# Patient Record
Sex: FEMALE | Race: ASIAN | NOT HISPANIC OR LATINO | Employment: FULL TIME
[De-identification: names, ages, dates, MRNs, and addresses within clinical notes are randomized per-mention and may not be internally consistent; named-entity substitution may affect disease eponyms.]

---

## 2017-10-08 ENCOUNTER — HEALTH MAINTENANCE LETTER (OUTPATIENT)
Age: 32
End: 2017-10-08

## 2022-05-05 ENCOUNTER — OFFICE VISIT (OUTPATIENT)
Dept: OBGYN | Facility: CLINIC | Age: 37
End: 2022-05-05
Payer: COMMERCIAL

## 2022-05-05 VITALS
SYSTOLIC BLOOD PRESSURE: 113 MMHG | TEMPERATURE: 98.4 F | BODY MASS INDEX: 23.92 KG/M2 | HEART RATE: 93 BPM | WEIGHT: 135 LBS | DIASTOLIC BLOOD PRESSURE: 72 MMHG | HEIGHT: 63 IN

## 2022-05-05 DIAGNOSIS — B00.1 RECURRENT COLD SORES: ICD-10-CM

## 2022-05-05 DIAGNOSIS — Z31.41 FERTILITY TESTING: ICD-10-CM

## 2022-05-05 DIAGNOSIS — N39.0 FREQUENT UTI: ICD-10-CM

## 2022-05-05 DIAGNOSIS — F33.1 MODERATE EPISODE OF RECURRENT MAJOR DEPRESSIVE DISORDER (H): ICD-10-CM

## 2022-05-05 DIAGNOSIS — Z11.3 SCREEN FOR STD (SEXUALLY TRANSMITTED DISEASE): ICD-10-CM

## 2022-05-05 DIAGNOSIS — Z23 NEED FOR TDAP VACCINATION: ICD-10-CM

## 2022-05-05 DIAGNOSIS — Z01.419 ENCOUNTER FOR GYNECOLOGICAL EXAMINATION WITHOUT ABNORMAL FINDING: Primary | ICD-10-CM

## 2022-05-05 DIAGNOSIS — Z12.4 SCREENING FOR MALIGNANT NEOPLASM OF CERVIX: ICD-10-CM

## 2022-05-05 DIAGNOSIS — N89.8 VAGINAL DISCHARGE: ICD-10-CM

## 2022-05-05 LAB
CLUE CELLS: ABNORMAL
TRICHOMONAS, WET PREP: ABNORMAL
WBC'S/HIGH POWER FIELD, WET PREP: ABNORMAL
YEAST, WET PREP: ABNORMAL

## 2022-05-05 PROCEDURE — 90471 IMMUNIZATION ADMIN: CPT | Performed by: OBSTETRICS & GYNECOLOGY

## 2022-05-05 PROCEDURE — 99214 OFFICE O/P EST MOD 30 MIN: CPT | Mod: 25 | Performed by: OBSTETRICS & GYNECOLOGY

## 2022-05-05 PROCEDURE — G0123 SCREEN CERV/VAG THIN LAYER: HCPCS | Performed by: OBSTETRICS & GYNECOLOGY

## 2022-05-05 PROCEDURE — 86780 TREPONEMA PALLIDUM: CPT | Performed by: OBSTETRICS & GYNECOLOGY

## 2022-05-05 PROCEDURE — 87389 HIV-1 AG W/HIV-1&-2 AB AG IA: CPT | Performed by: OBSTETRICS & GYNECOLOGY

## 2022-05-05 PROCEDURE — 83520 IMMUNOASSAY QUANT NOS NONAB: CPT | Mod: 90 | Performed by: OBSTETRICS & GYNECOLOGY

## 2022-05-05 PROCEDURE — 90715 TDAP VACCINE 7 YRS/> IM: CPT | Performed by: OBSTETRICS & GYNECOLOGY

## 2022-05-05 PROCEDURE — 36415 COLL VENOUS BLD VENIPUNCTURE: CPT | Performed by: OBSTETRICS & GYNECOLOGY

## 2022-05-05 PROCEDURE — 99000 SPECIMEN HANDLING OFFICE-LAB: CPT | Performed by: OBSTETRICS & GYNECOLOGY

## 2022-05-05 PROCEDURE — 99385 PREV VISIT NEW AGE 18-39: CPT | Mod: 25 | Performed by: OBSTETRICS & GYNECOLOGY

## 2022-05-05 PROCEDURE — 87491 CHLMYD TRACH DNA AMP PROBE: CPT | Performed by: OBSTETRICS & GYNECOLOGY

## 2022-05-05 PROCEDURE — 87210 SMEAR WET MOUNT SALINE/INK: CPT | Performed by: OBSTETRICS & GYNECOLOGY

## 2022-05-05 PROCEDURE — 87591 N.GONORRHOEAE DNA AMP PROB: CPT | Performed by: OBSTETRICS & GYNECOLOGY

## 2022-05-05 PROCEDURE — 87624 HPV HI-RISK TYP POOLED RSLT: CPT | Performed by: OBSTETRICS & GYNECOLOGY

## 2022-05-05 PROCEDURE — 86803 HEPATITIS C AB TEST: CPT | Performed by: OBSTETRICS & GYNECOLOGY

## 2022-05-05 RX ORDER — VALACYCLOVIR HYDROCHLORIDE 1 G/1
2000 TABLET, FILM COATED ORAL 2 TIMES DAILY
Qty: 4 TABLET | Refills: 3 | Status: SHIPPED | OUTPATIENT
Start: 2022-05-05 | End: 2022-05-06

## 2022-05-05 ASSESSMENT — ANXIETY QUESTIONNAIRES
1. FEELING NERVOUS, ANXIOUS, OR ON EDGE: MORE THAN HALF THE DAYS
7. FEELING AFRAID AS IF SOMETHING AWFUL MIGHT HAPPEN: SEVERAL DAYS
6. BECOMING EASILY ANNOYED OR IRRITABLE: MORE THAN HALF THE DAYS
GAD7 TOTAL SCORE: 9
IF YOU CHECKED OFF ANY PROBLEMS ON THIS QUESTIONNAIRE, HOW DIFFICULT HAVE THESE PROBLEMS MADE IT FOR YOU TO DO YOUR WORK, TAKE CARE OF THINGS AT HOME, OR GET ALONG WITH OTHER PEOPLE: SOMEWHAT DIFFICULT
3. WORRYING TOO MUCH ABOUT DIFFERENT THINGS: SEVERAL DAYS
5. BEING SO RESTLESS THAT IT IS HARD TO SIT STILL: NOT AT ALL
2. NOT BEING ABLE TO STOP OR CONTROL WORRYING: SEVERAL DAYS

## 2022-05-05 ASSESSMENT — PATIENT HEALTH QUESTIONNAIRE - PHQ9
5. POOR APPETITE OR OVEREATING: MORE THAN HALF THE DAYS
SUM OF ALL RESPONSES TO PHQ QUESTIONS 1-9: 10

## 2022-05-05 NOTE — PROGRESS NOTES
Ryanne is a 36 year old   female who presents for annual exam.     Menses are regular q 28-30 days and normal lasting 6 days.  Menses flow: normal and light.  Patient's last menstrual period was 2022.. Using none for contraception.  She is not currently considering pregnancy.  Besides routine health maintenance, she has there following health concerns:  - Depression. Was diagnosed with Bipolar and was prescribed lithium. Felt horrible on it and it made everything worse. This was through her former PCP. Then was diagnosed by psychiatry with depression and did well on other medications. Now is not on anything. Needs a new psychiatrist. Wants to work with psychiatry. Does endorse some thoughts of better off dead, but feels this is her baseline and has no concerns for her safety. Her mom is going through some health concerns, and it is really making Ryanne want to be alive and to prioritize her health. Thus she is returning to health care after many years away. Would like a new therapist as well.   PHQ 2022   PHQ-9 Total Score 10   Q9: Thoughts of better off dead/self-harm past 2 weeks Several days     ANU-7 SCORE 2022   Total Score 9     - Wondering about her fertility. Would like to get a basic understanding of if she might be facing age related fertility decline. Has not used contraception, but is not actively trying to get pregnant. Stable partner of several years.   - Had several UTIs last year. All treated with virtual visits without UAs or cultures  - Describes cold sores, does virtual visits to get valtrex.   Works as a  in tech.   Loves cats.     GYNECOLOGIC HISTORY:  Menarche: 12  Age at first intercourse: 14 Number of lifetime partners: 30  Ryanne is sexually active with 1male partner(s) and is currently in monogamous relationship .    History sexually transmitted infections:No STD history  STI testing offered?  Accepted  ROGERS exposure: No  History of abnormal Pap smear: NO - age  "30- 65 PAP every 3 years recommended  Family history of breast CA: No  Family history of uterine/ovarian CA: No    Family history of colon CA: No mom had polpys    HEALTH MAINTENANCE:  Cholesterol: (No results found for: CHOL History of abnormal lipids: No  Mammo: no . History of abnormal Mammo: No.  Regular Self Breast Exams: No  Calcium/Vitamin D intake: source:  dairy, veggies Adequate? Yes  TSH: (No results found for: TSH )  Pap; (No results found for: PAP )    HISTORY:  OB History    Para Term  AB Living   1 0 0 0 1 0   SAB IAB Ectopic Multiple Live Births   0 0 0 0 0      # Outcome Date GA Lbr Torin/2nd Weight Sex Delivery Anes PTL Lv   1 AB              History reviewed. No pertinent past medical history.  History reviewed. No pertinent surgical history.  Family History   Problem Relation Age of Onset     Cancer Mother         Lung Cancer     Hypertension Mother      Hyperlipidemia Mother      Social History     Socioeconomic History     Marital status: Single     No current outpatient medications on file.   No Known Allergies    Past medical, surgical, social and family history were reviewed and updated in EPIC.    ROS:   C:     NEGATIVE for fever, chills, change in weight  I:       NEGATIVE for worrisome rashes, moles or lesions  E:     NEGATIVE for vision changes or irritation  E/M: NEGATIVE for ear, mouth and throat problems  R:     NEGATIVE for significant cough or SOB  CV:   NEGATIVE for chest pain, palpitations or peripheral edema  GI:     NEGATIVE for nausea, abdominal pain, heartburn, or change in bowel habits  :   NEGATIVE for frequency, dysuria, hematuria, vaginal discharge, or irregular bleeding  M:     NEGATIVE for significant arthralgias or myalgia  N:      NEGATIVE for weakness, dizziness or paresthesias  E:      NEGATIVE for temperature intolerance, skin/hair changes  P:      As above    EXAM:  /72   Pulse 93   Temp 98.4  F (36.9  C)   Ht 1.588 m (5' 2.52\")   Wt 61.2 kg " (135 lb)   LMP 2022   Breastfeeding No   BMI 24.28 kg/m     BMI: Body mass index is 24.28 kg/m .  Constitutional: healthy, alert and no distress  Head: Normocephalic. No masses, lesions, tenderness or abnormalities  Neck: Neck supple. Trachea midline. No adenopathy. Thyroid symmetric, normal size.   Cardiovascular: RRR.   Respiratory: Negative.   Breast: Breasts reveal mild symmetric fibrocystic densities, but there are no dominant, discrete, fixed or suspicious masses found.  Gastrointestinal: Abdomen soft, non-tender, non-distended. No masses, organomegaly.  :  Vulva:  No external lesions, normal female hair distribution, no inguinal adenopathy.  Copious thin white vaginal discharge.   Urethra:  Midline, non-tender, well supported, no discharge  Vagina:  Moist, pink, no abnormal discharge, no lesions  Uterus:  Normal size, anteverted , non-tender, freely mobile  Ovaries:  No masses appreciated, non-tender, mobile  Rectal Exam: deferred  Musculoskeletal: extremities normal  Skin: no suspicious lesions or rashes  Psychiatric: Affect appropriate, cooperative,mentation appears normal.     COUNSELING:   Reviewed preventive health counseling, as reflected in patient instructions       Regular exercise       Healthy diet/nutrition       Family planning       Folic Acid       HIV screeninx in teen years, 1x in adult years, and at intervals if high risk   reports that she has quit smoking. She has never used smokeless tobacco.    Body mass index is 24.28 kg/m .    FRAX Risk Assessment    ASSESSMENT:  36 year old female with satisfactory annual exam  (Z01.419) Encounter for gynecological examination without abnormal finding  (primary encounter diagnosis)  Comment:   Plan: Getting UTD on HM today. Celebrated her return to health care.     (Z23) Need for Tdap vaccination  Comment:   Plan: TDAP (ADACEL,BOOSTRIX)            (Z11.3) Screen for STD (sexually transmitted disease)  Comment:   Plan: NEISSERIA  GONORRHOEA PCR, CHLAMYDIA TRACHOMATIS        PCR, HIV Antigen Antibody Combo, Hepatitis C         Screen Reflex to HCV RNA Quant and Genotype,         Treponema Abs w Reflex to RPR and Titer            (Z12.4) Screening for malignant neoplasm of cervix  Comment:   Plan: Pap imaged thin layer screen with HPV -         recommended age 30 - 65 years (select HPV order        below)            (F33.1) Moderate episode of recurrent major depressive disorder (H)  Comment:   Plan: Adult Mental Health  Referral        Discussed symptoms and plan.   Referral to psych given complicated history  Discussed [facility] for short term therapy and bridge to long term therapy and gave contact info.     (Z31.41) Fertility testing  Comment:   Plan: Anti-Mullerian hormone        Discussed limitations and capabilities of ovarian reserve testing. Feels she would move forward with TTC or egg cryopreservation if she found concerns.     (N89.8) Vaginal discharge  Comment:   Plan: Wet prep - Clinic Collect        Wet prep negative     (B00.1) Recurrent cold sores  Comment:   Plan: valACYclovir (VALTREX) 1000 mg tablet            (N39.0) Frequent UTI  Comment: on file to test if needed.   Plan: UA with Microscopic reflex to Culture - lab         collect

## 2022-05-06 LAB
C TRACH DNA SPEC QL NAA+PROBE: NEGATIVE
HCV AB SERPL QL IA: NONREACTIVE
HIV 1+2 AB+HIV1 P24 AG SERPL QL IA: NONREACTIVE
N GONORRHOEA DNA SPEC QL NAA+PROBE: NEGATIVE
T PALLIDUM AB SER QL: NONREACTIVE

## 2022-05-06 ASSESSMENT — ANXIETY QUESTIONNAIRES: GAD7 TOTAL SCORE: 9

## 2022-05-07 LAB — MIS SERPL-MCNC: 10 NG/ML

## 2022-05-10 LAB
BKR LAB AP GYN ADEQUACY: NORMAL
BKR LAB AP GYN INTERPRETATION: NORMAL
BKR LAB AP HPV REFLEX: NORMAL
BKR LAB AP LMP: NORMAL
BKR LAB AP PREVIOUS ABNORMAL: NORMAL
PATH REPORT.COMMENTS IMP SPEC: NORMAL
PATH REPORT.COMMENTS IMP SPEC: NORMAL
PATH REPORT.RELEVANT HX SPEC: NORMAL

## 2022-05-12 LAB
HUMAN PAPILLOMA VIRUS 16 DNA: NEGATIVE
HUMAN PAPILLOMA VIRUS 18 DNA: NEGATIVE
HUMAN PAPILLOMA VIRUS FINAL DIAGNOSIS: NORMAL
HUMAN PAPILLOMA VIRUS OTHER HR: NEGATIVE

## 2023-02-10 ASSESSMENT — ANXIETY QUESTIONNAIRES
1. FEELING NERVOUS, ANXIOUS, OR ON EDGE: NEARLY EVERY DAY
6. BECOMING EASILY ANNOYED OR IRRITABLE: MORE THAN HALF THE DAYS
GAD7 TOTAL SCORE: 14
4. TROUBLE RELAXING: NEARLY EVERY DAY
3. WORRYING TOO MUCH ABOUT DIFFERENT THINGS: MORE THAN HALF THE DAYS
7. FEELING AFRAID AS IF SOMETHING AWFUL MIGHT HAPPEN: SEVERAL DAYS
5. BEING SO RESTLESS THAT IT IS HARD TO SIT STILL: SEVERAL DAYS
7. FEELING AFRAID AS IF SOMETHING AWFUL MIGHT HAPPEN: SEVERAL DAYS
IF YOU CHECKED OFF ANY PROBLEMS ON THIS QUESTIONNAIRE, HOW DIFFICULT HAVE THESE PROBLEMS MADE IT FOR YOU TO DO YOUR WORK, TAKE CARE OF THINGS AT HOME, OR GET ALONG WITH OTHER PEOPLE: SOMEWHAT DIFFICULT
8. IF YOU CHECKED OFF ANY PROBLEMS, HOW DIFFICULT HAVE THESE MADE IT FOR YOU TO DO YOUR WORK, TAKE CARE OF THINGS AT HOME, OR GET ALONG WITH OTHER PEOPLE?: SOMEWHAT DIFFICULT
2. NOT BEING ABLE TO STOP OR CONTROL WORRYING: MORE THAN HALF THE DAYS
GAD7 TOTAL SCORE: 14
GAD7 TOTAL SCORE: 14

## 2023-02-10 ASSESSMENT — PATIENT HEALTH QUESTIONNAIRE - PHQ9
10. IF YOU CHECKED OFF ANY PROBLEMS, HOW DIFFICULT HAVE THESE PROBLEMS MADE IT FOR YOU TO DO YOUR WORK, TAKE CARE OF THINGS AT HOME, OR GET ALONG WITH OTHER PEOPLE: SOMEWHAT DIFFICULT
SUM OF ALL RESPONSES TO PHQ QUESTIONS 1-9: 17
SUM OF ALL RESPONSES TO PHQ QUESTIONS 1-9: 17

## 2023-02-16 ENCOUNTER — VIRTUAL VISIT (OUTPATIENT)
Dept: PSYCHIATRY | Facility: CLINIC | Age: 38
End: 2023-02-16
Payer: COMMERCIAL

## 2023-02-16 DIAGNOSIS — F31.62 BIPOLAR 1 DISORDER, MIXED, MODERATE (H): Primary | ICD-10-CM

## 2023-02-16 DIAGNOSIS — F41.1 GAD (GENERALIZED ANXIETY DISORDER): ICD-10-CM

## 2023-02-16 DIAGNOSIS — F33.1 MODERATE EPISODE OF RECURRENT MAJOR DEPRESSIVE DISORDER (H): ICD-10-CM

## 2023-02-16 PROCEDURE — 99205 OFFICE O/P NEW HI 60 MIN: CPT | Mod: VID | Performed by: NURSE PRACTITIONER

## 2023-02-16 RX ORDER — HYDROXYZINE HYDROCHLORIDE 25 MG/1
25 TABLET, FILM COATED ORAL 3 TIMES DAILY PRN
Qty: 90 TABLET | Refills: 0 | Status: SHIPPED | OUTPATIENT
Start: 2023-02-16 | End: 2023-04-13 | Stop reason: DRUGHIGH

## 2023-02-16 RX ORDER — LAMOTRIGINE 25 MG/1
25 TABLET ORAL DAILY
Qty: 90 TABLET | Refills: 1 | Status: SHIPPED | OUTPATIENT
Start: 2023-02-16 | End: 2023-03-16

## 2023-02-16 RX ORDER — QUETIAPINE FUMARATE 50 MG/1
50 TABLET, FILM COATED ORAL AT BEDTIME
Qty: 30 TABLET | Refills: 1 | Status: SHIPPED | OUTPATIENT
Start: 2023-02-16 | End: 2023-04-27

## 2023-02-16 NOTE — PROGRESS NOTES
Ryanne Goff is a 37 year old who is being evaluated via a billable video visit.      Pt will join video visit via: [employer]  If there are problems joining the visit, send backup video invite via: Text to preferred phone: [phone]    Reason for telehealth visit: Patient has requested telehealth visit    Originating location (patient location): Patient's home    Will anyone else be joining the visit? Vida DACOSTA

## 2023-02-16 NOTE — NURSING NOTE
Is the patient currently in the state of [place]? YES    Visit mode:VIDEO    If the visit is dropped, the patient can be reconnected by: VIDEO VISIT: Text to cell phone: [phone]    Will anyone else be joining the visit? NO      How would you like to obtain your AVS? MyChart    Are changes needed to the allergy or medication list? NO    Comments or concerns regarding today's visit: TREVOR Sung VF

## 2023-02-16 NOTE — PROGRESS NOTES
"PSYCHIATRIC DIAGNOSTIC ASSESSMENT      Name:  Ryanne Goff  : 1985    Ryanne Goff is a 37 year old female who is being evaluated via a billable Video visit.      Telemedicine Visit: The patient's condition can be safely assessed and treated via synchronous audio and visual telemedicine encounter.      Reason for Telemedicine Visit: COVID 19 pandemic and the social and physical recommendations by the CDC and MD., Patient has requested telehealth visit, and Patient unable to travel      Originating Site (Patient Location): Patient's home    Distant Site (Provider Location): [facility] Outpatient Setting: [facility]    Consent:  The patient/guardian has verbally consented to: the potential risks and benefits of telemedicine (video visit or phone) versus in person care; bill my insurance or make self-payment for services provided; and responsibility for payment of non-covered services.     Mode of Communication:  [employer] platform     As the provider I attest to compliance with applicable laws and regulations related to telemedicine.                                              CHIEF COMPLAINT     \"To establish long term psychiatric care\"  HISTORY OF PRESENT ILLNESS     Patient is a 37 year old,   Not  or  female with a history of bipolar 1,mixed, moderate, generalized anxiety disorder, and moderate episode of recurrent major depressive disorder who presents for initial psychiatric evaluation following a referral by her primary physician, Elba Marley MD to establish long-term psychiatric care for the medication management of ongoing psychiatric symptoms related to mood dysregulation and anxiety.  Patient reports she has been stabilized on her previous medication therapy which include Trileptal, Seroquel, and propranolol but stopped taking this medication in the 2001 due to ineffectiveness.  Patient reported she stopped seeing her psychiatrist during this " period because he was no longer a good fit but continued to stabilize despite not taking medication.  Patient reports she has  started experiencing mood dysregulation about 2 weeks ago in the form of increased irritability, low frustration tolerance, difficulty falling asleep and staying asleep for several days while alternating with excessive sleep for extended period of time, excessive crying, and heightened anxiety.  She reported history of suicidal ideation and attempts with most recently in 2005 when he was hospitalized at [facility].  She reported several history of SIB by cutting but has not engaged in this for the past 5 years.  She denied history of substance abuse but reported experimenting with methamphetamine, cocaine, and marijuana while in college.  She has done EMDR therapy in the past but currently does not have a therapy.  She denies SI, SIB, and HI.  She also denies both auditory and visual hallucination.  She denies psychosis and lynette. Return to Clinic in 4 weeks for follow up.   PSYCHIATRIC HISTORY:   History of Psychiatric Hospitalizations:   - Inpatient: 2005 at [facility] related to cutting herself  - IOP/PHP/Day treatment: None  History of Suicidal Ideation: Positive  History of Suicide Attempts:  Positive by cutting herself   History of Self-injurious Behavior: Reports history of SIB by cutting most recent in 2017.  Current:  No  History of Violence/Aggression: Negative  History of Commitment? Negative  Electroconvulsive Therapy (ECT):Negative    PSYCHIATRIC REVIEW OF SYSTEMS:   Psychiatric Review of Systems:   Depression:   Reports: depressed mood, decreased interest, changes in sleep, changes in appetite, guilt, hopelessness, helplessness, impaired concentration, decreased energy, irritability.  Lyntete:   Denies: sleeplessness, increased goal-directed activities, abrupt increase in energy pressured speech  Psychosis:   Denies: visual hallucinations, auditory hallucinations,  "paranoia  Anxiety:   Reports: excessive worries that are difficult to control, panic attacks  PTSD:   Reports: re-experiencing past trauma, nightmares, increased arousal, avoidance of traumatic stimuli, impaired function.  Denies: re-experiencing past trauma, nightmares, increased arousal, avoidance of traumatic stimuli, impaired function.  OCD:   Denies: obsessions, checking, symmetry, cleaning, skin picking.  Eating Disorder:   Denies: restriction, binging, purging.    Sleep:       MEDICATIONS                                                                                                Current Outpatient Medications   Medication Sig     valACYclovir (VALTREX) 1000 mg tablet Take 2 tablets (2,000 mg) by mouth 2 times daily for 1 day     No current facility-administered medications for this visit.       DRUG MONITORING:  [place] Prescription Monitoring Program evaluating controlled substances in the last year in [place]:  [place] Pres[place] review was not needed today..      PAST PSYCHOTROPIC MEDICATIONS:  Prozac, Celexa, Lithium, Trileptal, Seroquel, Zoloft, Lexapro    VITALS   There were no vitals taken for this visit.     BP Readings from Last 1 Encounters:   05/05/22 113/72     Pulse Readings from Last 1 Encounters:   05/05/22 93     Wt Readings from Last 1 Encounters:   05/05/22 61.2 kg (135 lb)     Ht Readings from Last 1 Encounters:   05/05/22 1.588 m (5' 2.52\")     Estimated body mass index is 24.28 kg/m  as calculated from the following:    Height as of 5/5/22: 1.588 m (5' 2.52\").    Weight as of 5/5/22: 61.2 kg (135 lb).      PERTINENT HISTORY   PAST MEDICAL HISTORY:   Past Medical History:   Diagnosis Date     Moderate episode of recurrent major depressive disorder (H)        PAST SURGICAL HISTORY:   Past Surgical History:   Procedure Laterality Date     wisdom teeth         FAMILY HISTORY:   Family History   Problem Relation Age of Onset     Cancer Mother         Lung Cancer     " Hypertension Mother      Hyperlipidemia Mother        SOCIAL HISTORY:   Social History     Tobacco Use     Smoking status: Former     Smokeless tobacco: Never   Substance Use Topics     Alcohol use: Yes         Seizures or Head Injury: Denies history of head injury. Denies history of seizures.  History of cardiac disease, rheumatic fever, fainting or dizziness, especially with exercise, seizures, chest pain or shortness of breath with exercise, unexplained change in exercise tolerance, palpitations, high blood pressure, or heart murmur?   No    LABS & IMAGING                                                                                                                Personally reviewed  No lab results found.  No lab results found.  No lab results found.  No lab results found.  No results found for: RHV667, TBPR726, [id number], VITD3, D2VIT, D3VIT, DTOT, [id number], [id number], [id number], [id number], [id number], [id number]     ALLERGY & IMMUNIZATIONS     No Known Allergies    FAMILY MEDICAL HISTORY:     Family History     Problem (# of Occurrences) Relation (Name,Age of Onset)    Cancer (1) Mother: Lung Cancer    Hypertension (1) Mother    Hyperlipidemia (1) Mother            Family history of sudden or unexplained death or an event requiring resuscitation in children or young adults, cardiac arrhythmias (eg, Braulio-Parkinson-White syndrome), long QT syndrome, catecholaminergic paroxysmal ventricular tachycardia, Brugada syndrome, arrhythmogenic right ventricular dysplasia, hypertrophic cardiomyopathy, dilated cardiomyopathy, or Marfan syndrome?  No    FAMILY PSYCHIATRIC HISTORY:   Psychiatry:Depression runs in father's side  Substance use history in family: Negative  Family suicide history:Negative      SIGNIFICANT SOCIAL/FAMILY HISTORY:                                           Born and raised in: [place]  Relationship status: Single but engaged  Children: None    Highest education level was:Bachelor's  "degree   Service:Denies  Employment status:  of a software company  LEGAL:     SUBSTANCE USE HISTORY    Tobacco use: Quit smoking in 2021  Caffeine: 1 cup of coffee once weekly  Current alcohol: Drinks socially about once a week  Current substance use:Denies  Past use alcohol/substance use:Marijuana, methamphetamine, cocaine      MEDICAL REVIEW OF SYSTEMS:   Ten system review was completed with pertinent positives noted above    MENTAL STATUS EXAM:   Mental Status Examination (limited due to video virtual visit format):  Vital Signs: There were no vitals taken for this virtual visit.  Appearance: adequately groomed, appears stated age, and in no apparent distress.  Attitude: cooperative   Eye Contact: good to the extent that can be determined in a video visit  Muscle Strength and Tone: no gross abnormalities based on remote observation  Psychomotor Behavior:  no evidence of tardive dyskinesia, dystonia, or tics based on remote observation  Gait and Station: normal, no gross abnormalities based on remote observation  Speech: clear, coherent, normal prosody, regular rate, regular rhythm and fluent  Associations: No loosening of associations  Thought Process: coherent and goal directed  Thought Content: no evidence of suicidal ideation or homicidal ideation, no evidence of psychotic thought, no auditory hallucinations present and no visual hallucinations present  Mood: \"good\"  Affect: appropriate and in normal range  Insight: good  Judgment: intact, adequate for safety  Impulse Control: intact  Oriented to: time, place, person and situation  Attention Span and Concentration: normal  Language: Intact  Recent and Remote Memory: intact to interview. Not formally assessed. No amnesia.  Fund of Knowledge: appropriate        SAFETY   Feels safe in home: Yes   Suicidal ideation: Denies  History of suicide attempts:  No   Hx of impulsivity: No     DSM 5 DIAGNOSIS:   1. Bipolar 1 disorder, mixed, moderate " (H)    2. ANU (generalized anxiety disorder)    3. Moderate episode of recurrent major depressive disorder (H)    ASSESSMENT AND PLAN    Patient is a 37 year old,   Not  or  female with a history of bipolar 1,mixed, moderate, generalized anxiety disorder, and moderate episode of recurrent major depressive disorder who presents for initial psychiatric evaluation following a referral by her primary physician, Elba Marley MD to establish long-term psychiatric care for the medication management of ongoing psychiatric symptoms related to mood dysregulation and anxiety.  Patient with a history of suicidal ideation and attempts has stopped taking her psychiatric medication as follow-up 2001 due to ineffectiveness as well as having bad relationship with her previous psychiatrist.  She stopped seeing her psychiatrist because she believed they were no longer a good match.  She has been experiencing mood and emotional reactivity in the form of increased irritability, low frustration tolerance, poor anger management, increased agitation, periods of lack of sleep while alternating with periods of excessive sleep, excessive crying, and heightened anxiety in the setting of medication noncompliance.  Patient presentation and symptoms appear to be consistent with bipolar 1 disorder, moderate with some  elements of borderline personality disorder.  She used to engage in self-injurious behavior by cutting but has not engaged in this behavior for the past 5 years.  She has done EMDR therapy in [place], [place] few years ago to help cope with past trauma related to significant other committing suicide with his family blaming it on her.  She is currently looking to connecting with a therapist for individual psychotherapy.  I started the patient on Lamictal 25 mg for 2 weeks then increase to 50 mg after 2 weeks and then take 75 mg after 4 weeks to effectively manage mood and emotional lability while also starting her on  Seroquel 50 mg at bedtime to help with anxiety and difficulty falling and staying asleep.  Patient was also started on hydroxyzine 25 mg 3 times daily as needed for anxiety.  I discussed medication side effects, benefits, and risks with the patient.  Patient verbalized good understanding and she is in agreement to taking Lamictal, Seroquel, and hydroxyzine to help manage ongoing symptoms.  She denies SI, SIB, and ideation.  She also denies both auditory and visual hallucination.  She denies amber and psychosis. RTC in 4 weeks for follow up.     Plan:  1.Patient will take the medications as prescribed.   Medications: Take Lamictal 1 tablet (25 mg) For two weeks, then take 2 tablets (50 mg) after two weeks, then increase to 3 tablets (75 mg) after four weeks for mood, anxiety and depression  Take Seroquel 50 mg at bedtime for mood anxiety and sleep  Take Hydroxyzine 25 mg three times daily as needed for anxiety  Patient will not stop taking medications or adjust them without consulting with the provider.  2.Patient will call with any problems between visits.  3.Patient will go to the emergency room if not feeling safe , unable to function in the community, or if suicidal, homicidal or hearing voices or having paranoia.  4.Patient will abstain from drugs and alcohol./Pt denies use .  5.Patient will not drive if sedated on medications or under influence of any substance.    6.Patient will not mix psychiatric medications with drugs and alcohol.   7.Patient will watch her diet and exercise.  8.Patient will see non psychiatric providers for non psychiatric disorders.  9. Next appointment in one  month     Risk Assessment:     [place] has notable risk factors for self-harm, including, single status, anxiety, hx of suicide attempt and SIB,  and passive SI. However, risk is mitigated by ability to volunteer a safety plan and history of seeking help when needed. Additional steps taken to minimize risk include making medication  adjustment, asking patient to call 911 and go to the ER if not able to stay safe at home,  Therefore, based on all available evidence including the factors cited above, Ryanne does not appear to be an imminent danger to self or others and does not meet criteria 72 hour hold. However, if patient uses substances or is non-adherent with medication, their risk of decompensation and SI/HI will be elevated. This was discussed with the patient as she verbalized good understanding.  CONSULTS/REFERRALS:   Continue therapy  None at this time  Coordinate care with therapist as needed    MEDICAL:   None at this time  Coordinate care with PCP (Jaclyn Kelly) as needed  Follow up with primary care provider as planned or for acute medical concerns.    PSYCHOEDUCATION:  Medication side effects and alternatives reviewed. Health promotion activities recommended and reviewed today. All questions addressed. Education and counseling completed regarding risks and benefits of medications and psychotherapy options.  Consent provided by patient/guardian  Call the psychiatric nurse line with medication questions or concerns at [phone].  [employer] may be used to communicate with your provider, but this is not intended to be used for emergencies.  BLACK BOX WARNING: Discussed the Food and Drug Administration (FDA) requires that all antidepressants carry a warning that some children, adolescents and young adults may be at increased risk of suicide when taking antidepressants. Anyone taking an antidepressant should be watched closely for worsening depression or unusual behavior especially in the first few weeks after starting an SSRI. Keep in mind, antidepressants are more likely to reduce suicide risk in the long run by improving mood.   SEROTONIN SYNDROME:  Discussed risks of Serotonin syndrome (ie, serotonin toxicity) which is a potentially life-threatening condition associated with increased serotonergic activity in the central nervous  system (CNS). It is seen with therapeutic medication use, inadvertent interactions between drugs, and intentional self-poisoning. Serotonin syndrome may involve a spectrum of clinical findings, which often include mental status changes, autonomic hyperactivity, and neuromuscular abnormalities.    BENZODIAZEPINE:  discussion on how benzos work and the need to use them short term due to potential of anxiety getting.  This is a controlled substance with risk for abuse, need to keep in a safe keep place and cannot replace lost scripts.    HYPNOTIC USE: Hypnotic use, risk for CNS depression, sleep-walking, not to mix with ETOH or other CNS depressant, need for six hours of sleep, stop if change in mood.  This is a controlled substance with risk for abuse, need to keep in a safe keep place and cannot replace lost scripts.  FIRST GENERATION ANTIPSYCHOTIC/ SECOND GENERATION ANTIPSYCHOTIC USE:  Atypical need for cardiometabolic monitoring with medication- B/P, weight, blood sugar, cholesterol.  Need to monitor for abnormal movements taught  SAFETY:  We all care about your loved one's safety. To reduce the risk of self-harm, remove access to all:  Firearms, Medicines (both prescribed and over-the-counter), Knives and other sharp objects, Ropes and like materials, and Alcohol  SLEEP HYGIENE: establish a sleep routine, limit screen time 1 hour prior to bed, use bed for sleep only, take sleep/medications on time (including sleepy time tea, trazadone or herbal treatments such as melatonin), aroma therapy, limit caffeine/sugar, yoga, guided imagery, stretch, meditation, limit naps to 20 minutes, make a temperature change in the room, white noise, be mindful of slowing down breathing, take a warm bath/shower, frequently wash sheets, and journaling.   Medlineplus.gov is information for patients.  It is run by the [employer] and it contains information about all disorders, diseases and all medications.       COMMUNITY RESOURCES:    CRISIS NUMBERS: Provided in AVS 2023  National Suicide Prevention Lifeline: 4-475-526-TALK (957-766-1197)  [employer]/resources for a list of additional resources (SOS)            [facility] - [phone]   Urgent Care Adult Mental [facility]-[phone] mobile unit/  crisis line  [facility] -[phone]   COPE  [place] Mobile Team -[phone] (adults)/ [phone] (child)  Poison Control Center - 1-107.170.5071    OR  go to nearest ER  Crisis Text Line for any crisis  send this-   To: 722849   [facility]  [phone]  National Suicide Prevention Lifeline: 684.962.7892 (TTY: 898.865.7284). Call anytime for help.  (www.suicidepreventionlifeline.org)  National [place] [employer] Mental Illness (www.dio.org): 760-390-2399 or 795-954-5141.   Mental Health Association (www.mentalhealth.org): 522.147.5081 or 149-133-9186.  [place] Crisis Text Line: Text [place] to 537497  Suicide LifeLine Chat: suicideLumoraline.org/chat    ADMINISTRATIVE BILLIN Time spent interviewing patient, reviewing referral documents, obtaining and reviewing outside records, communication with other health specialists, and preparing this report on this day: 23    Video/Phone Start Time:8:35 am    Video/Phone End Time: 9:08 am    Greater than 50% of time was spent in counseling and coordination of care regarding above diagnoses and treatment plan.    Patient Status:  Our psychiatry providers act as a specialty service for Primary Care Providers in the [facility] that seek to optimize medications for unstable patients.  Once medications have been optimized, our providers discharge the patient back to the referring Primary Care Provider for ongoing medication management.  This type of system allows our providers to serve a high volume of patients. At this time  Patient will continue to be seen for ongoing  consultation and stabilization.    Signed:   Corby Jaeger, MSN, APRN, PMHNP-BC  Long Term Outpatient Psychiatry  Chart documentation done in part with Dragon Voice Recognition software.  Although reviewed after completion, some word and grammatical errors may remain.  Answers for HPI/ROS submitted by the patient on 2/10/2023  If you checked off any problems, how difficult have these problems made it for you to do your work, take care of things at home, or get along with other people?: Somewhat difficult  PHQ9 TOTAL SCORE: 17  ANU 7 TOTAL SCORE: 14

## 2023-02-16 NOTE — PATIENT INSTRUCTIONS
Plan:  1.Patient will take the medications as prescribed.   Medications: Take Lamictal 1 tablet (25 mg) For two weeks, then take 2 tablets (50 mg) after two weeks, then increase to 3 tablets (75 mg) after four weeks for mood, anxiety and depression  Take Seroquel 50 mg at bedtime for mood anxiety and sleep  Take Hydroxyzine 25 mg three times daily as needed for anxiety  Patient will not stop taking medications or adjust them without consulting with the provider.  2.Patient will call with any problems between visits.  3.Patient will go to the emergency room if not feeling safe , unable to function in the community, or if suicidal, homicidal or hearing voices or having paranoia.  4.Patient will abstain from drugs and alcohol./Pt denies use .  5.Patient will not drive if sedated on medications or under influence of any substance.    6.Patient will not mix psychiatric medications with drugs and alcohol.   7.Patient will watch her diet and exercise.  8.Patient will see non psychiatric providers for non psychiatric disorders.  9. Next appointment in one  month

## 2023-03-15 ASSESSMENT — ANXIETY QUESTIONNAIRES
8. IF YOU CHECKED OFF ANY PROBLEMS, HOW DIFFICULT HAVE THESE MADE IT FOR YOU TO DO YOUR WORK, TAKE CARE OF THINGS AT HOME, OR GET ALONG WITH OTHER PEOPLE?: SOMEWHAT DIFFICULT
7. FEELING AFRAID AS IF SOMETHING AWFUL MIGHT HAPPEN: SEVERAL DAYS
1. FEELING NERVOUS, ANXIOUS, OR ON EDGE: NEARLY EVERY DAY
7. FEELING AFRAID AS IF SOMETHING AWFUL MIGHT HAPPEN: SEVERAL DAYS
GAD7 TOTAL SCORE: 19
4. TROUBLE RELAXING: NEARLY EVERY DAY
2. NOT BEING ABLE TO STOP OR CONTROL WORRYING: NEARLY EVERY DAY
3. WORRYING TOO MUCH ABOUT DIFFERENT THINGS: NEARLY EVERY DAY
6. BECOMING EASILY ANNOYED OR IRRITABLE: NEARLY EVERY DAY
IF YOU CHECKED OFF ANY PROBLEMS ON THIS QUESTIONNAIRE, HOW DIFFICULT HAVE THESE PROBLEMS MADE IT FOR YOU TO DO YOUR WORK, TAKE CARE OF THINGS AT HOME, OR GET ALONG WITH OTHER PEOPLE: SOMEWHAT DIFFICULT
5. BEING SO RESTLESS THAT IT IS HARD TO SIT STILL: NEARLY EVERY DAY
GAD7 TOTAL SCORE: 19
GAD7 TOTAL SCORE: 19

## 2023-03-15 ASSESSMENT — PATIENT HEALTH QUESTIONNAIRE - PHQ9
10. IF YOU CHECKED OFF ANY PROBLEMS, HOW DIFFICULT HAVE THESE PROBLEMS MADE IT FOR YOU TO DO YOUR WORK, TAKE CARE OF THINGS AT HOME, OR GET ALONG WITH OTHER PEOPLE: SOMEWHAT DIFFICULT
SUM OF ALL RESPONSES TO PHQ QUESTIONS 1-9: 13
SUM OF ALL RESPONSES TO PHQ QUESTIONS 1-9: 13

## 2023-03-16 ENCOUNTER — VIRTUAL VISIT (OUTPATIENT)
Dept: PSYCHIATRY | Facility: CLINIC | Age: 38
End: 2023-03-16
Payer: COMMERCIAL

## 2023-03-16 DIAGNOSIS — F41.1 GAD (GENERALIZED ANXIETY DISORDER): ICD-10-CM

## 2023-03-16 DIAGNOSIS — F31.62 BIPOLAR 1 DISORDER, MIXED, MODERATE (H): ICD-10-CM

## 2023-03-16 PROCEDURE — 99214 OFFICE O/P EST MOD 30 MIN: CPT | Mod: VID | Performed by: NURSE PRACTITIONER

## 2023-03-16 RX ORDER — LAMOTRIGINE 25 MG/1
50 TABLET ORAL DAILY
Qty: 120 TABLET | Refills: 1 | Status: SHIPPED | OUTPATIENT
Start: 2023-03-16 | End: 2023-05-07 | Stop reason: DRUGHIGH

## 2023-03-16 NOTE — PATIENT INSTRUCTIONS
Plan:  1.Patient will take the medications as prescribed.   Medications: Take Lamictal take 2 tablets (50 mg) after two weeks, then increase to 4 tablets (100 mg) after two weeks for mood, anxiety and depression  Continue Seroquel 50 mg at bedtime for mood anxiety and sleep  Continue Hydroxyzine 25 mg three times daily as needed for anxiety  Patient will not stop taking medications or adjust them without consulting with the provider.  2.Patient will call with any problems between visits.  3.Patient will go to the emergency room if not feeling safe , unable to function in the community, or if suicidal, homicidal or hearing voices or having paranoia.  4.Patient will abstain from drugs and alcohol./Pt denies use .  5.Patient will not drive if sedated on medications or under influence of any substance.    6.Patient will not mix psychiatric medications with drugs and alcohol.   7.Patient will watch her diet and exercise.  8.Patient will see non psychiatric providers for non psychiatric disorders.  9. Next appointment in one  month

## 2023-03-16 NOTE — PROGRESS NOTES
"PSYCHIATRIC PROGRESS NOTE     Name:  Ryanne Goff  : 1985    Ryanne Goff is a 37 year old female who is being evaluated via a billable Video visit.      Telemedicine Visit: The patient's condition can be safely assessed and treated via synchronous audio and visual telemedicine encounter.      Reason for Telemedicine Visit: COVID 19 pandemic and the social and physical recommendations by the CDC and MD., Patient has requested telehealth visit, and Patient unable to travel      Originating Site (Patient Location): Patient's home    Distant Site (Provider Location): [facility] Outpatient Setting: [facility]    Consent:  The patient/guardian has verbally consented to: the potential risks and benefits of telemedicine (video visit or phone) versus in person care; bill my insurance or make self-payment for services provided; and responsibility for payment of non-covered services.     Mode of Communication:  [employer] platform     As the provider I attest to compliance with applicable laws and regulations related to telemedicine.    Date of Last Visit: 23                                          CHIEF COMPLAINT     \"No change since the last time\"  HISTORY OF PRESENT ILLNESS     Patient who was last seen in the clinic on 23 returned today for follow up visit. Patient reports she has not noticed much improvement in symptoms since last visit although, stating she started taking her medication about 3 weeks ago.  Patient reports she still experiences both emotional and mood reactivity in the form of irritability, increased anxiety, and difficulties falling asleep.  Patient also stated that she has noted decrease in bowel movement since she started taking this medication but not sure the problem with the bowel movement was due to traveling outside the country for a vacation in the [place] about the same time she started taking the medication.  I encouraged patient to keep " taking her medication that this side effect will subside after her body gets used to the medication.  I advised patient to start taking Lamictal 100 mg after completing the 50 mg dose for 2 weeks.  Patient verbalized good understanding and she is in agreement to keep taking her medication while increasing the Lamictal to 100 mg after taking 50 mg dose for 2 weeks.  Patient currently denies both suicidal or homicidal ideation.  He also denies auditory or visual hallucination.  Patient reports no lynette or hypomania.  Patient to return to clinic in 4 weeks for follow-up.    PSYCHIATRIC HISTORY:   History of Psychiatric Hospitalizations:   - Inpatient: 2005 at [facility] related to cutting herself  - IOP/PHP/Day treatment: None  History of Suicidal Ideation: Positive  History of Suicide Attempts:  Positive by cutting herself   History of Self-injurious Behavior: Reports history of SIB by cutting most recent in 2017.  Current:  No  History of Violence/Aggression: Negative  History of Commitment? Negative  Electroconvulsive Therapy (ECT):Negative    PSYCHIATRIC REVIEW OF SYSTEMS:   Psychiatric Review of Systems:   Depression:   Reports: depressed mood, decreased interest, changes in sleep, changes in appetite, guilt, hopelessness, helplessness, impaired concentration, decreased energy, irritability.  Lynette:   Denies: sleeplessness, increased goal-directed activities, abrupt increase in energy pressured speech  Psychosis:   Denies: visual hallucinations, auditory hallucinations, paranoia  Anxiety:   Reports: excessive worries that are difficult to control, panic attacks  PTSD:   Reports: re-experiencing past trauma, nightmares, increased arousal, avoidance of traumatic stimuli, impaired function.  Denies: re-experiencing past trauma, nightmares, increased arousal, avoidance of traumatic stimuli, impaired function.  OCD:   Denies: obsessions, checking, symmetry, cleaning, skin picking.  Eating Disorder:   Denies:  "restriction, binging, purging.    Sleep:       MEDICATIONS                                                                                                Current Outpatient Medications   Medication Sig     hydrOXYzine (ATARAX) 25 MG tablet Take 1 tablet (25 mg) by mouth 3 times daily as needed for anxiety     lamoTRIgine (LAMICTAL) 25 MG tablet Take 1 tablet (25 mg) by mouth daily For two weeks, then take 2 tablets (50 mg) after two weeks, then increase to 3 tablets (75 mg) after four week     QUEtiapine (SEROQUEL) 50 MG tablet Take 1 tablet (50 mg) by mouth At Bedtime     valACYclovir (VALTREX) 1000 mg tablet Take 2 tablets (2,000 mg) by mouth 2 times daily for 1 day     No current facility-administered medications for this visit.       DRUG MONITORING:  [place] Prescription Monitoring Program evaluating controlled substances in the last year in [place]:  [place] Pres[place] review was not needed today..      PAST PSYCHOTROPIC MEDICATIONS:  Prozac, Celexa, Lithium, Trileptal, Seroquel, Zoloft, Lexapro    VITALS   There were no vitals taken for this visit.     BP Readings from Last 1 Encounters:   05/05/22 113/72     Pulse Readings from Last 1 Encounters:   05/05/22 93     Wt Readings from Last 1 Encounters:   05/05/22 61.2 kg (135 lb)     Ht Readings from Last 1 Encounters:   05/05/22 1.588 m (5' 2.52\")     Estimated body mass index is 24.28 kg/m  as calculated from the following:    Height as of 5/5/22: 1.588 m (5' 2.52\").    Weight as of 5/5/22: 61.2 kg (135 lb).      PERTINENT HISTORY   PAST MEDICAL HISTORY:   Past Medical History:   Diagnosis Date     Moderate episode of recurrent major depressive disorder (H)        PAST SURGICAL HISTORY:   Past Surgical History:   Procedure Laterality Date     wisdom teeth         FAMILY HISTORY:   Family History   Problem Relation Age of Onset     Cancer Mother         Lung Cancer     Hypertension Mother      Hyperlipidemia Mother        SOCIAL HISTORY: "   Social History     Tobacco Use     Smoking status: Former     Smokeless tobacco: Never   Substance Use Topics     Alcohol use: Yes         Seizures or Head Injury: Denies history of head injury. Denies history of seizures.  History of cardiac disease, rheumatic fever, fainting or dizziness, especially with exercise, seizures, chest pain or shortness of breath with exercise, unexplained change in exercise tolerance, palpitations, high blood pressure, or heart murmur?   No    LABS & IMAGING                                                                                                                Personally reviewed  No lab results found.  No lab results found.  No lab results found.  No lab results found.  No results found for: GPQ894, FOKF552, DEQT71APCGU, VITD3, D2VIT, D3VIT, DTOT, [id number], [id number], [id number], [id number], [id number], [id number]     ALLERGY & IMMUNIZATIONS     No Known Allergies    FAMILY MEDICAL HISTORY:     Family History     Problem (# of Occurrences) Relation (Name,Age of Onset)    Cancer (1) Mother: Lung Cancer    Hypertension (1) Mother    Hyperlipidemia (1) Mother            Family history of sudden or unexplained death or an event requiring resuscitation in children or young adults, cardiac arrhythmias (eg, Braulio-Parkinson-White syndrome), long QT syndrome, catecholaminergic paroxysmal ventricular tachycardia, Brugada syndrome, arrhythmogenic right ventricular dysplasia, hypertrophic cardiomyopathy, dilated cardiomyopathy, or Marfan syndrome?  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  Appearance: adequately groomed, appears stated age, and in no apparent distress.  Attitude: cooperative   Eye Contact: good to the extent that can be determined in a video visit  Muscle Strength and Tone: no gross abnormalities based on remote observation  Psychomotor Behavior:  no evidence of tardive dyskinesia, dystonia, or tics based on remote observation  Gait and Station: normal, no gross abnormalities based on remote observation  Speech: clear, coherent, normal prosody, regular rate, regular rhythm and fluent  Associations: No loosening of associations  Thought Process: coherent and goal directed  Thought Content: no evidence of suicidal ideation or homicidal ideation, no evidence of psychotic thought, no auditory hallucinations present and no visual hallucinations present  Mood: \"good\"  Affect: appropriate and in normal range  Insight: good  Judgment: intact, adequate for safety  Impulse Control: intact  Oriented to: time, place, person and situation  Attention Span and Concentration: normal  Language: Intact  Recent and Remote Memory: intact to interview. Not formally assessed. No amnesia.  Fund of Knowledge: appropriate        SAFETY   Feels safe in home: Yes   Suicidal ideation: Denies  History of suicide attempts:  No   Hx of impulsivity: No     DSM 5 DIAGNOSIS:   1. Bipolar 1 disorder, mixed, moderate (H)    2. ANU (generalized anxiety disorder)    3. Moderate episode of " recurrent major depressive disorder (H)    ASSESSMENT AND PLAN    Patient is a 37 year old,   Not  or  female with a history of bipolar 1,mixed, moderate, generalized anxiety disorder, and moderate episode of recurrent major depressive disorder who presents for follow-up visit.  She has not noticed much improvement in symptoms since started taking medication.  She is still experiences increased mood and emotional activity.  Patient does report she started taking medication a week late after it was prescribed.  She does notice decrease bowel movement since started taking medication but not sure if this was due to stress related to traveling overseas and did not seem to have when she started taking this medication.  Patient reported that she went to the [place] few days after our last meeting.  I encouraged patient to continue taking her medication as time went away addressed with the side effect as she continues to take this medication.  I instructed patient to take Lamictal 100 mg after taking 50 mg dose for 2 weeks.  Patient verbalized good understanding and is in agreement to taking Lamictal 100 mg after completing the 50 mg dose.  Patient currently denied SI, SIB, HI.  She also denied auditory or visual hallucination.  Patient reports no amber or psychosis.  Patient will return to clinic in 4 weeks for follow-up.  Plan:  1.Patient will take the medications as prescribed.   Medications: Take Lamictal take 2 tablets (50 mg) after two weeks, then increase to 4 tablets (100 mg) after two weeks for mood, anxiety and depression  Continue Seroquel 50 mg at bedtime for mood anxiety and sleep  Continue Hydroxyzine 25 mg three times daily as needed for anxiety  Patient will not stop taking medications or adjust them without consulting with the provider.  2.Patient will call with any problems between visits.  3.Patient will go to the emergency room if not feeling safe , unable to function in  the community, or if suicidal, homicidal or hearing voices or having paranoia.  4.Patient will abstain from drugs and alcohol./Pt denies use .  5.Patient will not drive if sedated on medications or under influence of any substance.    6.Patient will not mix psychiatric medications with drugs and alcohol.   7.Patient will watch her diet and exercise.  8.Patient will see non psychiatric providers for non psychiatric disorders.  9. Next appointment in one  month     Risk Assessment:     [place] has notable risk factors for self-harm, including, single status, anxiety, hx of suicide attempt and SIB,  and passive SI. However, risk is mitigated by ability to volunteer a safety plan and history of seeking help when needed. Additional steps taken to minimize risk include making medication adjustment, asking patient to call 911 and go to the ER if not able to stay safe at home,  Therefore, based on all available evidence including the factors cited above, Ryanne does not appear to be an imminent danger to self or others and does not meet criteria 72 hour hold. However, if patient uses substances or is non-adherent with medication, their risk of decompensation and SI/HI will be elevated. This was discussed with the patient as she verbalized good understanding.  CONSULTS/REFERRALS:   Continue therapy  None at this time  Coordinate care with therapist as needed    MEDICAL:   None at this time  Coordinate care with PCP (Jaclyn Kelly) as needed  Follow up with primary care provider as planned or for acute medical concerns.    PSYCHOEDUCATION:  Medication side effects and alternatives reviewed. Health promotion activities recommended and reviewed today. All questions addressed. Education and counseling completed regarding risks and benefits of medications and psychotherapy options.  Consent provided by patient/guardian  Call the psychiatric nurse line with medication questions or concerns at [phone].  Indelsulkhushboot may be used to  communicate with your provider, but this is not intended to be used for emergencies.  BLACK BOX WARNING: Discussed the Food and Drug Administration (FDA) requires that all antidepressants carry a warning that some children, adolescents and young adults may be at increased risk of suicide when taking antidepressants. Anyone taking an antidepressant should be watched closely for worsening depression or unusual behavior especially in the first few weeks after starting an SSRI. Keep in mind, antidepressants are more likely to reduce suicide risk in the long run by improving mood.   SEROTONIN SYNDROME:  Discussed risks of Serotonin syndrome (ie, serotonin toxicity) which is a potentially life-threatening condition associated with increased serotonergic activity in the central nervous system (CNS). It is seen with therapeutic medication use, inadvertent interactions between drugs, and intentional self-poisoning. Serotonin syndrome may involve a spectrum of clinical findings, which often include mental status changes, autonomic hyperactivity, and neuromuscular abnormalities.    BENZODIAZEPINE:  discussion on how benzos work and the need to use them short term due to potential of anxiety getting.  This is a controlled substance with risk for abuse, need to keep in a safe keep place and cannot replace lost scripts.    HYPNOTIC USE: Hypnotic use, risk for CNS depression, sleep-walking, not to mix with ETOH or other CNS depressant, need for six hours of sleep, stop if change in mood.  This is a controlled substance with risk for abuse, need to keep in a safe keep place and cannot replace lost scripts.  FIRST GENERATION ANTIPSYCHOTIC/ SECOND GENERATION ANTIPSYCHOTIC USE:  Atypical need for cardiometabolic monitoring with medication- B/P, weight, blood sugar, cholesterol.  Need to monitor for abnormal movements taught  SAFETY:  We all care about your loved one's safety. To reduce the risk of self-harm, remove access to all:   Firearms, Medicines (both prescribed and over-the-counter), Knives and other sharp objects, Ropes and like materials, and Alcohol  SLEEP HYGIENE: establish a sleep routine, limit screen time 1 hour prior to bed, use bed for sleep only, take sleep/medications on time (including sleepy time tea, trazadone or herbal treatments such as melatonin), aroma therapy, limit caffeine/sugar, yoga, guided imagery, stretch, meditation, limit naps to 20 minutes, make a temperature change in the room, white noise, be mindful of slowing down breathing, take a warm bath/shower, frequently wash sheets, and journaling.   Medlineplus.gov is information for patients.  It is run by the [employer] and it contains information about all disorders, diseases and all medications.      COMMUNITY RESOURCES:    CRISIS NUMBERS: Provided in AVS 3/16/2023  National Suicide Prevention Lifeline: 3-939-534-TALK (187-219-6542)  [employer]/resources for a list of additional resources (SOS)            [facility] - [phone]   Urgent Care Adult Mental Lp[facility]-[phone] mobile unit/  crisis line  [facility] -[phone]   COPE  [place] Mobile Team -[phone] (adults)/ [phone] (child)  Poison Control Center - 1-549.743.3019    OR  go to nearest ER  Crisis Text Line for any crisis  send this-   To: 156853   [facility]  [phone]  National Suicide Prevention Lifeline: 488.846.6439 (TTY: 567.613.6471). Call anytime for help.  (www.suicidepreventionlifeline.org)  National Delight on Mental Illness (www.dio.org): 361.619.5661 or 018-991-1639.   Mental Health Association (www.mentalhealth.org): 213.226.4111 or 679-652-7574.  [place] Crisis Text Line: Text [place] to 073801  Suicide LifeLine Chat: suicideFundabilityline.org/chat    ADMINISTRATIVE BILLIN Time spent interviewing patient, reviewing referral documents, obtaining and  reviewing outside records, communication with other health specialists, and preparing this report on this day: 3/16/23    Video/Phone Start Time:8:03 am    Video/Phone End Time: 8:21 am    Greater than 50% of time was spent in counseling and coordination of care regarding above diagnoses and treatment plan.    Patient Status:  Our psychiatry providers act as a specialty service for Primary Care Providers in the [facility] that seek to optimize medications for unstable patients.  Once medications have been optimized, our providers discharge the patient back to the referring Primary Care Provider for ongoing medication management.  This type of system allows our providers to serve a high volume of patients. At this time  Patient will continue to be seen for ongoing consultation and stabilization.    Signed:   Corby Jaeger, MSN, APRN, PMHNP-BC  Long Term Outpatient Psychiatry  Chart documentation done in part with Dragon Voice Recognition software.  Although reviewed after completion, some word and grammatical errors may remain.  Answers for HPI/ROS submitted by the patient on 2/10/2023  If you checked off any problems, how difficult have these problems made it for you to do your work, take care of things at home, or get along with other people?: Somewhat difficult  PHQ9 TOTAL SCORE: 17  ANU 7 TOTAL SCORE: 14    Answers for HPI/ROS submitted by the patient on 3/15/2023  If you checked off any problems, how difficult have these problems made it for you to do your work, take care of things at home, or get along with other people?: Somewhat difficult  PHQ9 TOTAL SCORE: 13  ANU 7 TOTAL SCORE: 19

## 2023-03-16 NOTE — NURSING NOTE
Is the patient currently in the state of [place]? YES    Visit mode:VIDEO    If the visit is dropped, the patient can be reconnected by: VIDEO VISIT:  Send e-mail to at [email or web address]    Will anyone else be joining the visit? No  (If patient encounters technical issues they should call [phone])    How would you like to obtain your AVS? MyChart    Are changes needed to the allergy or medication list? NO    Rooming Documentation:     Reason for visit:  Follow Up    CORDELL Pantoja

## 2023-04-12 ASSESSMENT — ANXIETY QUESTIONNAIRES
IF YOU CHECKED OFF ANY PROBLEMS ON THIS QUESTIONNAIRE, HOW DIFFICULT HAVE THESE PROBLEMS MADE IT FOR YOU TO DO YOUR WORK, TAKE CARE OF THINGS AT HOME, OR GET ALONG WITH OTHER PEOPLE: SOMEWHAT DIFFICULT
5. BEING SO RESTLESS THAT IT IS HARD TO SIT STILL: SEVERAL DAYS
GAD7 TOTAL SCORE: 11
6. BECOMING EASILY ANNOYED OR IRRITABLE: NEARLY EVERY DAY
GAD7 TOTAL SCORE: 11
7. FEELING AFRAID AS IF SOMETHING AWFUL MIGHT HAPPEN: SEVERAL DAYS
2. NOT BEING ABLE TO STOP OR CONTROL WORRYING: SEVERAL DAYS
4. TROUBLE RELAXING: MORE THAN HALF THE DAYS
GAD7 TOTAL SCORE: 11
7. FEELING AFRAID AS IF SOMETHING AWFUL MIGHT HAPPEN: SEVERAL DAYS
3. WORRYING TOO MUCH ABOUT DIFFERENT THINGS: SEVERAL DAYS
8. IF YOU CHECKED OFF ANY PROBLEMS, HOW DIFFICULT HAVE THESE MADE IT FOR YOU TO DO YOUR WORK, TAKE CARE OF THINGS AT HOME, OR GET ALONG WITH OTHER PEOPLE?: SOMEWHAT DIFFICULT
1. FEELING NERVOUS, ANXIOUS, OR ON EDGE: MORE THAN HALF THE DAYS

## 2023-04-12 ASSESSMENT — PATIENT HEALTH QUESTIONNAIRE - PHQ9
SUM OF ALL RESPONSES TO PHQ QUESTIONS 1-9: 10
SUM OF ALL RESPONSES TO PHQ QUESTIONS 1-9: 10
10. IF YOU CHECKED OFF ANY PROBLEMS, HOW DIFFICULT HAVE THESE PROBLEMS MADE IT FOR YOU TO DO YOUR WORK, TAKE CARE OF THINGS AT HOME, OR GET ALONG WITH OTHER PEOPLE: SOMEWHAT DIFFICULT

## 2023-04-13 ENCOUNTER — VIRTUAL VISIT (OUTPATIENT)
Dept: PSYCHIATRY | Facility: CLINIC | Age: 38
End: 2023-04-13
Payer: COMMERCIAL

## 2023-04-13 DIAGNOSIS — F31.62 BIPOLAR 1 DISORDER, MIXED, MODERATE (H): Primary | ICD-10-CM

## 2023-04-13 DIAGNOSIS — F41.1 GAD (GENERALIZED ANXIETY DISORDER): ICD-10-CM

## 2023-04-13 PROCEDURE — 99214 OFFICE O/P EST MOD 30 MIN: CPT | Mod: VID | Performed by: NURSE PRACTITIONER

## 2023-04-13 RX ORDER — LAMOTRIGINE 150 MG/1
150 TABLET ORAL DAILY
Qty: 30 TABLET | Refills: 1 | Status: SHIPPED | OUTPATIENT
Start: 2023-04-13 | End: 2023-06-13

## 2023-04-13 RX ORDER — BUSPIRONE HYDROCHLORIDE 10 MG/1
10 TABLET ORAL 2 TIMES DAILY
Qty: 60 TABLET | Refills: 1 | Status: SHIPPED | OUTPATIENT
Start: 2023-04-13 | End: 2023-05-07 | Stop reason: DRUGHIGH

## 2023-04-13 NOTE — NURSING NOTE
Is the patient currently in the state of [place]? YES - at home.    Visit mode:VIDEO    If the visit is dropped, the patient can be reconnected by: Declined- Will rejoin via mychart if needed    Will anyone else be joining the visit? No  (If patient encounters technical issues they should call [phone])    How would you like to obtain your AVS? MyChart    Are changes needed to the allergy or medication list? YES Pt reported taking lamotrigine - 4 tablets (100 mg) daily, every morning.    Rooming Documentation: Care team has reviewed attendance agreement with patient. Patient advised that two failed appointments within 6 months may lead to termination of current episode of care.      Reason for visit: Follow Up for medications.    Lydia Tamayo, FRANNYF

## 2023-04-13 NOTE — PATIENT INSTRUCTIONS
Plan:  1.Patient will take the medications as prescribed.   Medications: Take Lamictal 150 mg for mood, anxiety and depression  Take Buspar 10 mg twice daily for anxiety  Continue Seroquel 50 mg at bedtime for mood anxiety and sleep  Continue Hydroxyzine 25 mg three times daily as needed for anxiety  Patient will not stop taking medications or adjust them without consulting with the provider.  2.Patient will call with any problems between visits.  3.Patient will go to the emergency room if not feeling safe , unable to function in the community, or if suicidal, homicidal or hearing voices or having paranoia.  4.Patient will abstain from drugs and alcohol./Pt denies use .  5.Patient will not drive if sedated on medications or under influence of any substance.    6.Patient will not mix psychiatric medications with drugs and alcohol.   7.Patient will watch her diet and exercise.  8.Patient will see non psychiatric providers for non psychiatric disorders.  9. Next appointment in one  month

## 2023-04-13 NOTE — PROGRESS NOTES
Answers for HPI/ROS submitted by the patient on 4/12/2023  If you checked off any problems, how difficult have these problems made it for you to do your work, take care of things at home, or get along with other people?: Somewhat difficult  PHQ9 TOTAL SCORE: 10  ANU 7 TOTAL SCORE: 11

## 2023-04-13 NOTE — PROGRESS NOTES
"PSYCHIATRIC PROGRESS NOTE     Name:  Rynane Goff  : 1985    Ryanne Goff is a 37 year old female who is being evaluated via a billable Video visit.      Telemedicine Visit: The patient's condition can be safely assessed and treated via synchronous audio and visual telemedicine encounter.      Reason for Telemedicine Visit: COVID 19 pandemic and the social and physical recommendations by the CDC and MD., Patient has requested telehealth visit, and Patient unable to travel      Originating Site (Patient Location): Patient's home    Distant Site (Provider Location): [facility] Outpatient Setting: [facility]    Consent:  The patient/guardian has verbally consented to: the potential risks and benefits of telemedicine (video visit or phone) versus in person care; bill my insurance or make self-payment for services provided; and responsibility for payment of non-covered services.     Mode of Communication:  ZOCKO platform     As the provider I attest to compliance with applicable laws and regulations related to telemedicine.    Date of Last Visit: 3/16/23                                          CHIEF COMPLAINT     \"Noticed some improvements\"  HISTORY OF PRESENT ILLNESS     Patient who was last seen in the clinic on 3/16/23 returned today for follow up visit. Patient reports she has noticed  improvement in symptoms since last visit although, stating mood is more regulated compared to the last time.  She reports she still experiences occasional irritability and heightened anxiety particularly at work.  She also stated anxiety has increased slightly more than the usual level for the past week.  She reported experiences heightened anxiety particularly when travel by air.  She does mention the Lamictal is more tolerated as the constipation she experienced when she started medication has decreased significantly.  I discussed increasing Lamictal to 150 mg daily to effectively manage mood " and emotional reactivity while starting her on buspirone 10 mg twice daily to help with heightened anxiety.  I discussed medication side effects, risks, and benefits patient.  Patient verbalized good understanding and she is in agreement with taking buspirone to help manage ongoing symptoms.   Patient currently denies both suicidal or homicidal ideation.  He also denies auditory or visual hallucination.  Patient reports no lynette or hypomania.  Patient to return to clinic in 4 weeks for follow-up.    PSYCHIATRIC HISTORY:   History of Psychiatric Hospitalizations:   - Inpatient: 2005 at [facility] related to cutting herself  - IOP/PHP/Day treatment: None  History of Suicidal Ideation: Positive  History of Suicide Attempts:  Positive by cutting herself   History of Self-injurious Behavior: Reports history of SIB by cutting most recent in 2017.  Current:  No  History of Violence/Aggression: Negative  History of Commitment? Negative  Electroconvulsive Therapy (ECT):Negative    PSYCHIATRIC REVIEW OF SYSTEMS:   Psychiatric Review of Systems:   Depression:   Reports: depressed mood, decreased interest, changes in sleep, changes in appetite, guilt, hopelessness, helplessness, impaired concentration, decreased energy, irritability.  Lynette:   Denies: sleeplessness, increased goal-directed activities, abrupt increase in energy pressured speech  Psychosis:   Denies: visual hallucinations, auditory hallucinations, paranoia  Anxiety:   Reports: excessive worries that are difficult to control, panic attacks  PTSD:   Reports: re-experiencing past trauma, nightmares, increased arousal, avoidance of traumatic stimuli, impaired function.  Denies: re-experiencing past trauma, nightmares, increased arousal, avoidance of traumatic stimuli, impaired function.  OCD:   Denies: obsessions, checking, symmetry, cleaning, skin picking.  Eating Disorder:   Denies: restriction, binging, purging.    Sleep:       MEDICATIONS                      "                                                                           Current Outpatient Medications   Medication Sig     hydrOXYzine (ATARAX) 25 MG tablet Take 1 tablet (25 mg) by mouth 3 times daily as needed for anxiety     lamoTRIgine (LAMICTAL) 25 MG tablet Take 2 tablets (50 mg) by mouth daily For two weeks, then take 4 tablets (100 mg) after two weeks     QUEtiapine (SEROQUEL) 50 MG tablet Take 1 tablet (50 mg) by mouth At Bedtime     valACYclovir (VALTREX) 1000 mg tablet Take 2 tablets (2,000 mg) by mouth 2 times daily for 1 day     No current facility-administered medications for this visit.       DRUG MONITORING:  [place] Prescription Monitoring Program [place] controlled substances in the last year in [place]:  [place] review was not needed today..      PAST PSYCHOTROPIC MEDICATIONS:  Prozac, Celexa, Lithium, Trileptal, Seroquel, Zoloft, Lexapro    VITALS   There were no vitals taken for this visit.     BP Readings from Last 1 Encounters:   05/05/22 113/72     Pulse Readings from Last 1 Encounters:   05/05/22 93     Wt Readings from Last 1 Encounters:   05/05/22 61.2 kg (135 lb)     Ht Readings from Last 1 Encounters:   05/05/22 1.588 m (5' 2.52\")     Estimated body mass index is 24.28 kg/m  as calculated from the following:    Height as of 5/5/22: 1.588 m (5' 2.52\").    Weight as of 5/5/22: 61.2 kg (135 lb).      PERTINENT HISTORY   PAST MEDICAL HISTORY:   Past Medical History:   Diagnosis Date     Moderate episode of recurrent major depressive disorder (H)        PAST SURGICAL HISTORY:   Past Surgical History:   Procedure Laterality Date     wisdom teeth         FAMILY HISTORY:   Family History   Problem Relation Age of Onset     Cancer Mother         Lung Cancer     Hypertension Mother      Hyperlipidemia Mother        SOCIAL HISTORY:   Social History     Tobacco Use     Smoking status: Former     Smokeless tobacco: Never   Vaping Use     Vaping status: Never Used "   Substance Use Topics     Alcohol use: Yes         Seizures or Head Injury: Denies history of head injury. Denies history of seizures.  History of cardiac disease, rheumatic fever, fainting or dizziness, especially with exercise, seizures, chest pain or shortness of breath with exercise, unexplained change in exercise tolerance, palpitations, high blood pressure, or heart murmur?   No    LABS & IMAGING                                                                                                                Personally reviewed  No lab results found.  No lab results found.  No lab results found.  No lab results found.  No results found for: JXF727, [id number], [id number], VITD3, D2VIT, D3VIT, DTOT, [id number], [id number], [id number], [id number], [id number], [id number]     ALLERGY & IMMUNIZATIONS     No Known Allergies    FAMILY MEDICAL HISTORY:     Family History     Problem (# of Occurrences) Relation (Name,Age of Onset)    Cancer (1) Mother: Lung Cancer    Hypertension (1) Mother    Hyperlipidemia (1) Mother            Family history of sudden or unexplained death or an event requiring resuscitation in children or young adults, cardiac arrhythmias (eg, Braulio-Parkinson-White syndrome), long QT syndrome, catecholaminergic paroxysmal ventricular tachycardia, Brugada syndrome, arrhythmogenic right ventricular dysplasia, hypertrophic cardiomyopathy, dilated cardiomyopathy, or Marfan syndrome?  No    FAMILY PSYCHIATRIC HISTORY:   Psychiatry:Depression runs in father's side  Substance use history in family: Negative  Family suicide history:Negative      SIGNIFICANT SOCIAL/FAMILY HISTORY:                                           Born and raised in: [place]  Relationship status: Single but engaged  Children: None    Highest education level was:Bachelor's degree   Service:Denies  Employment status:  of a software company  LEGAL:     SUBSTANCE USE HISTORY    Tobacco use: Quit smoking in  "2021  Caffeine: 1 cup of coffee once weekly  Current alcohol: Drinks socially about once a week  Current substance use:Denies  Past use alcohol/substance use:Marijuana, methamphetamine, cocaine      MEDICAL REVIEW OF SYSTEMS:   Ten system review was completed with pertinent positives noted above    MENTAL STATUS EXAM:   Mental Status Examination (limited due to video virtual visit format):  Vital Signs: There were no vitals taken for this virtual visit.  Appearance: adequately groomed, appears stated age, and in no apparent distress.  Attitude: cooperative   Eye Contact: good to the extent that can be determined in a video visit  Muscle Strength and Tone: no gross abnormalities based on remote observation  Psychomotor Behavior:  no evidence of tardive dyskinesia, dystonia, or tics based on remote observation  Gait and Station: normal, no gross abnormalities based on remote observation  Speech: clear, coherent, normal prosody, regular rate, regular rhythm and fluent  Associations: No loosening of associations  Thought Process: coherent and goal directed  Thought Content: no evidence of suicidal ideation or homicidal ideation, no evidence of psychotic thought, no auditory hallucinations present and no visual hallucinations present  Mood: \"good\"  Affect: appropriate and in normal range  Insight: good  Judgment: intact, adequate for safety  Impulse Control: intact  Oriented to: time, place, person and situation  Attention Span and Concentration: normal  Language: Intact  Recent and Remote Memory: intact to interview. Not formally assessed. No amnesia.  Fund of Knowledge: appropriate        SAFETY   Feels safe in home: Yes   Suicidal ideation: Denies  History of suicide attempts:  No   Hx of impulsivity: No     DSM 5 DIAGNOSIS:   1. Bipolar 1 disorder, mixed, moderate (H)    2. ANU (generalized anxiety disorder)    3. Moderate episode of recurrent major depressive disorder (H)    ASSESSMENT AND PLAN    Patient is a 37 " year old,   Not  or  female with a history of bipolar 1,mixed, moderate, generalized anxiety disorder, and moderate episode of recurrent major depressive disorder who presents for follow-up visit.  She has noticed little improvement in symptoms since taking Lamictal 100 mg.  Mood is more regulated compared to before.  She complains of heightened anxiety even despite taking hydroxyzine.  Lamictal increased to 150 mg to effectively manage mood and emotional reactivity.  I started her on buspirone 10 mg twice daily to help manage heightened anxiety.  Patient currently denied SI, SIB, HI.  She also denied auditory or visual hallucination.  Patient reports no amber or psychosis.  Patient will return to clinic in 4 weeks for follow-up.    Plan:  1.Patient will take the medications as prescribed.   Med  ications: Take Lamictal 150 mg for mood, anxiety and depression  Take Buspar 10 mg twice daily for anxiety  Continue Seroquel 50 mg at bedtime for mood anxiety and sleep  Continue Hydroxyzine 25 mg three times daily as needed for anxiety  Patient will not stop taking medications or adjust them without consulting with the provider.  2.Patient will call with any problems between visits.  3.Patient will go to the emergency room if not feeling safe , unable to function in the community, or if suicidal, homicidal or hearing voices or having paranoia.  4.Patient will abstain from drugs and alcohol./Pt denies use .  5.Patient will not drive if sedated on medications or under influence of any substance.    6.Patient will not mix psychiatric medications with drugs and alcohol.   7.Patient will watch her diet and exercise.  8.Patient will see non psychiatric providers for non psychiatric disorders.  9. Next appointment in one  month     Risk Assessment:     [facility] has notable risk factors for self-harm, including, single status, anxiety, hx of suicide attempt and SIB,  and passive SI. However, risk is mitigated by  ability to volunteer a safety plan and history of seeking help when needed. Additional steps taken to minimize risk include making medication adjustment, asking patient to call 911 and go to the ER if not able to stay safe at home,  Therefore, based on all available evidence including the factors cited above, Ryanne does not appear to be an imminent danger to self or others and does not meet criteria 72 hour hold. However, if patient uses substances or is non-adherent with medication, their risk of decompensation and SI/HI will be elevated. This was discussed with the patient as she verbalized good understanding.  CONSULTS/REFERRALS:   Continue therapy  None at this time  Coordinate care with therapist as needed    MEDICAL:   None at this time  Coordinate care with PCP (Jaclyn Kelly) as needed  Follow up with primary care provider as planned or for acute medical concerns.    PSYCHOEDUCATION:  Medication side effects and alternatives reviewed. Health promotion activities recommended and reviewed today. All questions addressed. Education and counseling completed regarding risks and benefits of medications and psychotherapy options.  Consent provided by patient/guardian  Call the psychiatric nurse line with medication questions or concerns at [phone].  [employer] may be used to communicate with your provider, but this is not intended to be used for emergencies.  BLACK BOX WARNING: Discussed the Food and Drug Administration (FDA) requires that all antidepressants carry a warning that some children, adolescents and young adults may be at increased risk of suicide when taking antidepressants. Anyone taking an antidepressant should be watched closely for worsening depression or unusual behavior especially in the first few weeks after starting an SSRI. Keep in mind, antidepressants are more likely to reduce suicide risk in the long run by improving mood.   SEROTONIN SYNDROME:  Discussed risks of Serotonin syndrome (ie,  serotonin toxicity) which is a potentially life-threatening condition associated with increased serotonergic activity in the central nervous system (CNS). It is seen with therapeutic medication use, inadvertent interactions between drugs, and intentional self-poisoning. Serotonin syndrome may involve a spectrum of clinical findings, which often include mental status changes, autonomic hyperactivity, and neuromuscular abnormalities.    BENZODIAZEPINE:  discussion on how benzos work and the need to use them short term due to potential of anxiety getting.  This is a controlled substance with risk for abuse, need to keep in a safe keep place and cannot replace lost scripts.    HYPNOTIC USE: Hypnotic use, risk for CNS depression, sleep-walking, not to mix with ETOH or other CNS depressant, need for six hours of sleep, stop if change in mood.  This is a controlled substance with risk for abuse, need to keep in a safe keep place and cannot replace lost scripts.  FIRST GENERATION ANTIPSYCHOTIC/ SECOND GENERATION ANTIPSYCHOTIC USE:  Atypical need for cardiometabolic monitoring with medication- B/P, weight, blood sugar, cholesterol.  Need to monitor for abnormal movements taught  SAFETY:  We all care about your loved one's safety. To reduce the risk of self-harm, remove access to all:  Firearms, Medicines (both prescribed and over-the-counter), Knives and other sharp objects, Ropes and like materials, and Alcohol  SLEEP HYGIENE: establish a sleep routine, limit screen time 1 hour prior to bed, use bed for sleep only, take sleep/medications on time (including sleepy time tea, trazadone or herbal treatments such as melatonin), aroma therapy, limit caffeine/sugar, yoga, guided imagery, stretch, meditation, limit naps to 20 minutes, make a temperature change in the room, white noise, be mindful of slowing down breathing, take a warm bath/shower, frequently wash sheets, and journaling.   Medlineplus.gov is information for  patients.  It is run by the [employer] and it contains information about all disorders, diseases and all medications.      COMMUNITY RESOURCES:    CRISIS NUMBERS: Provided in AVS 2023  National Suicide Prevention Lifeline: 9-517-494-TALK (231-558-5524)  Readbug/resources for a list of additional resources (SOS)            [facility] - [phone]   Urgent Care Adult Mental Gzmpgv-[phone] mobile unit/  crisis line  [facility] -[phone]   COPE  [place] Mobile Team -[phone] (adults)/ [phone] (child)  Poison Control Center - 1-174.948.1123    OR  go to nearest ER  Crisis Text Line for any crisis  send this-   To: 185838   [facility]) [facility]  [phone]  National Suicide Prevention Lifeline: 838.778.3504 (TTY: 201.852.5601). Call anytime for help.  (www.suicidepreventionlifeline.org)  National [place] on Mental Illness (www.dio.org): 619-624-8687 or 971-568-0731.   Mental Health Association (www.mentalhealth.org): 613.405.1718 or 229-957-3510.  [place] Crisis Text Line: Text [place] to 542857  Suicide LifeLine Chat: [employer].org/chat    ADMINISTRATIVE BILLIN Time spent interviewing patient, reviewing referral documents, obtaining and reviewing outside records, communication with other health specialists, and preparing this report on this day: 23    Video/Phone Start Time:8:03 am    Video/Phone End Time: 8:15 am    Greater than 50% of time was spent in counseling and coordination of care regarding above diagnoses and treatment plan.    Patient Status:  Our psychiatry providers act as a specialty service for Primary Care Providers in the [facility] that seek to optimize medications for unstable patients.  Once medications have been optimized, our providers discharge the patient back to the referring Primary Care Provider for ongoing medication management.  This type  of system allows our providers to serve a high volume of patients. At this time  Patient will continue to be seen for ongoing consultation and stabilization.    Signed:   Corby Jaeger, MSN, APRN, PMHNP-BC  Long Term Outpatient Psychiatry  Chart documentation done in part with Dragon Voice Recognition software.  Although reviewed after completion, some word and grammatical errors may remain.  Answers for HPI/ROS submitted by the patient on 2/10/2023  If you checked off any problems, how difficult have these problems made it for you to do your work, take care of things at home, or get along with other people?: Somewhat difficult  PHQ9 TOTAL SCORE: 17  ANU 7 TOTAL SCORE: 14    Answers for HPI/ROS submitted by the patient on 3/15/2023  If you checked off any problems, how difficult have these problems made it for you to do your work, take care of things at home, or get along with other people?: Somewhat difficult  PHQ9 TOTAL SCORE: 13  ANU 7 TOTAL SCORE: 19    Answers for HPI/ROS submitted by the patient on 4/12/2023  If you checked off any problems, how difficult have these problems made it for you to do your work, take care of things at home, or get along with other people?: Somewhat difficult  PHQ9 TOTAL SCORE: 10  ANU 7 TOTAL SCORE: 11

## 2023-04-20 ENCOUNTER — PATIENT OUTREACH (OUTPATIENT)
Dept: CARE COORDINATION | Facility: CLINIC | Age: 38
End: 2023-04-20
Payer: COMMERCIAL

## 2023-05-02 ENCOUNTER — TELEPHONE (OUTPATIENT)
Dept: BEHAVIORAL HEALTH | Facility: CLINIC | Age: 38
End: 2023-05-02
Payer: COMMERCIAL

## 2023-05-02 NOTE — TELEPHONE ENCOUNTER
First attempt at reaching patient to schedule DA with [facility] and Therapy with [facility]. Left message asking for a return call. Coordinator sent Dig[employer] message to patient.    Elba Cardona  Transition Clinic Coordinator  Date and Time: 05/02/23 1:02 PM      ----- Message from Jen Wilkins sent at 5/1/2023  1:37 PM CDT -----  Regarding: Bridge adult therapy  Transition Clinic Referral   [place]         Please Check Type of Referral Requested:       __x__THERAPY: The Transition clinic is able to schedule patients without current medical insurance; these patient will be referred to our Social Work Care Coordinator for Medical Insurance              Assistance. We are open for referral for psychotherapy. Patient is referred from:  Outpatient Intake      ____MEDICATION:  Referrals for Medication are ONLY accepted from the following areas (select):                                        Suboxone and Opioid Management Referrals are automatically denied. [facility] Psychiatry cannot see patient without active medical insurance.   [facility] Psychiatry cannot accept patient with next level of care scheduled with PCP      GUARDIAN: If your patient is not their own Guardian, please provide the following:    Guardian Name:  Guardian Contact Information (Phone & Email) :  Guardian Address:     FOSTER CARE PROVIDER: If your patient lives at a Licensed Foster Care, please provide the following:    Foster Provider:  Foster Provider Contact Information (Phone & Email):  Foster Provider Address:         Referring Provider Contact Name: NA; Phone Number: NA    Reason for Transition Clinic Referral: Bridge adult therapy    Next Level of Care Patient Will Be Transitioned To: [facility]  Provider(s)Swapna Houser  Location [place]  Date/Time Sept 14th, 2023   1pm    What Would Be Helpful from the Transition Clinic: bridge therapy     Needs: NO    Does Patient Have Access to Technology: yes MyC    Patient E-mail Address:  [email or web address]    Current Patient Phone Number: [phone];     Clinician Gender Preference (if applicable): YES: female    Patient location preference: [place]

## 2023-05-02 NOTE — TELEPHONE ENCOUNTER
Pt called back to TC. Scheduled virtual DA with [facility] 5/9/2023 and initial TC Therapy 5/30/2023.    Tracker will be completed and response sent to referral source.    Elba Cardona  Transition Clinic Coordinator  Date and Time: 05/02/23 1:29 PM

## 2023-05-04 ENCOUNTER — TELEPHONE (OUTPATIENT)
Dept: BEHAVIORAL HEALTH | Facility: CLINIC | Age: 38
End: 2023-05-04
Payer: COMMERCIAL

## 2023-05-04 ENCOUNTER — PATIENT OUTREACH (OUTPATIENT)
Dept: CARE COORDINATION | Facility: CLINIC | Age: 38
End: 2023-05-04
Payer: COMMERCIAL

## 2023-05-04 ASSESSMENT — ANXIETY QUESTIONNAIRES
8. IF YOU CHECKED OFF ANY PROBLEMS, HOW DIFFICULT HAVE THESE MADE IT FOR YOU TO DO YOUR WORK, TAKE CARE OF THINGS AT HOME, OR GET ALONG WITH OTHER PEOPLE?: VERY DIFFICULT
GAD7 TOTAL SCORE: 14
4. TROUBLE RELAXING: NEARLY EVERY DAY
7. FEELING AFRAID AS IF SOMETHING AWFUL MIGHT HAPPEN: SEVERAL DAYS
GAD7 TOTAL SCORE: 14
5. BEING SO RESTLESS THAT IT IS HARD TO SIT STILL: SEVERAL DAYS
7. FEELING AFRAID AS IF SOMETHING AWFUL MIGHT HAPPEN: SEVERAL DAYS
3. WORRYING TOO MUCH ABOUT DIFFERENT THINGS: MORE THAN HALF THE DAYS
7. FEELING AFRAID AS IF SOMETHING AWFUL MIGHT HAPPEN: SEVERAL DAYS
GAD7 TOTAL SCORE: 14
IF YOU CHECKED OFF ANY PROBLEMS ON THIS QUESTIONNAIRE, HOW DIFFICULT HAVE THESE PROBLEMS MADE IT FOR YOU TO DO YOUR WORK, TAKE CARE OF THINGS AT HOME, OR GET ALONG WITH OTHER PEOPLE: VERY DIFFICULT
IF YOU CHECKED OFF ANY PROBLEMS ON THIS QUESTIONNAIRE, HOW DIFFICULT HAVE THESE PROBLEMS MADE IT FOR YOU TO DO YOUR WORK, TAKE CARE OF THINGS AT HOME, OR GET ALONG WITH OTHER PEOPLE: VERY DIFFICULT
6. BECOMING EASILY ANNOYED OR IRRITABLE: MORE THAN HALF THE DAYS
3. WORRYING TOO MUCH ABOUT DIFFERENT THINGS: MORE THAN HALF THE DAYS
5. BEING SO RESTLESS THAT IT IS HARD TO SIT STILL: SEVERAL DAYS
8. IF YOU CHECKED OFF ANY PROBLEMS, HOW DIFFICULT HAVE THESE MADE IT FOR YOU TO DO YOUR WORK, TAKE CARE OF THINGS AT HOME, OR GET ALONG WITH OTHER PEOPLE?: VERY DIFFICULT
GAD7 TOTAL SCORE: 14
7. FEELING AFRAID AS IF SOMETHING AWFUL MIGHT HAPPEN: SEVERAL DAYS
GAD7 TOTAL SCORE: 14
1. FEELING NERVOUS, ANXIOUS, OR ON EDGE: MORE THAN HALF THE DAYS
4. TROUBLE RELAXING: NEARLY EVERY DAY
GAD7 TOTAL SCORE: 14
1. FEELING NERVOUS, ANXIOUS, OR ON EDGE: MORE THAN HALF THE DAYS
2. NOT BEING ABLE TO STOP OR CONTROL WORRYING: NEARLY EVERY DAY
6. BECOMING EASILY ANNOYED OR IRRITABLE: MORE THAN HALF THE DAYS
2. NOT BEING ABLE TO STOP OR CONTROL WORRYING: NEARLY EVERY DAY

## 2023-05-04 NOTE — TELEPHONE ENCOUNTER
Per request of provider, attempted to reach patient to adjust future scheduled appointments to reflect change to schedule/template. Left voicemail asking for call back.    Elba Cardona  Transition Clinic Coordinator  Date and Time: 05/04/23 1:14 PM

## 2023-05-05 ENCOUNTER — VIRTUAL VISIT (OUTPATIENT)
Dept: PSYCHIATRY | Facility: CLINIC | Age: 38
End: 2023-05-05
Payer: COMMERCIAL

## 2023-05-05 DIAGNOSIS — F41.1 GAD (GENERALIZED ANXIETY DISORDER): ICD-10-CM

## 2023-05-05 DIAGNOSIS — F31.62 BIPOLAR 1 DISORDER, MIXED, MODERATE (H): Primary | ICD-10-CM

## 2023-05-05 DIAGNOSIS — F33.1 MODERATE EPISODE OF RECURRENT MAJOR DEPRESSIVE DISORDER (H): ICD-10-CM

## 2023-05-05 PROCEDURE — 99214 OFFICE O/P EST MOD 30 MIN: CPT | Mod: VID | Performed by: NURSE PRACTITIONER

## 2023-05-05 RX ORDER — CLONAZEPAM 0.5 MG/1
0.5 TABLET ORAL DAILY PRN
Qty: 15 TABLET | Refills: 0 | Status: SHIPPED | OUTPATIENT
Start: 2023-05-05

## 2023-05-05 NOTE — PROGRESS NOTES
"PSYCHIATRIC PROGRESS NOTE     Name:  Ryanne Goff  : 1985    Ryanne Goff is a 37 year old female who is being evaluated via a billable Video visit.      Telemedicine Visit: The patient's condition can be safely assessed and treated via synchronous audio and visual telemedicine encounter.      Reason for Telemedicine Visit: COVID 19 pandemic and the social and physical recommendations by the CDC and MD., Patient has requested telehealth visit, and Patient unable to travel      Originating Site (Patient Location): Patient's home    Distant Site (Provider Location): [facility] Outpatient Setting: [facility]    Consent:  The patient/guardian has verbally consented to: the potential risks and benefits of telemedicine (video visit or phone) versus in person care; bill my insurance or make self-payment for services provided; and responsibility for payment of non-covered services.     Mode of Communication:  [email or web address] platform     As the provider I attest to compliance with applicable laws and regulations related to telemedicine.    Date of Last Visit: 23                                          CHIEF COMPLAINT     \"I had some rough days last week\"  HISTORY OF PRESENT ILLNESS     Patient who was last seen in the clinic on 23 returned today for follow up visit.  Patient reports she had couple of bad days last week related to psychosocial stressors from her job.  Patient reports she was overwhelmed with job performance and no longer believe this is the kind of job she wants to keep doing.  Patient reports that she was extremely anxious with low frustration tolerance.  Patient reports that she was unable to calm herself down and started engaging in self-injurious behavior by cutting her left arm with blade.  Patient reported she cleaned the fresh cuts and put a Band-Aid on it.  Patient stated the cost was superficial and healing well.  Patient reported she did not inform anybody " about the current but called the clinic to let the  Staff aware.patient reports she is no longer having thoughts of self-harm.  Patient reported she only started taking Latuda 40 mg about 2 days ago.  Patient reports that she has not noticed any side effects from this medication.  Patient reports her mood is somewhat stabilized currently with no emotional or mood reactivity at this time.  Patient also mentions she will be [facility] this coming Monday to bridge care until when she would was start seeing her Therapist in Sept Patient reports she continues to be extremely anxious with panic attacks whenever she enters airplane, stating the hydroxyzine as needed and buspirone have not been effectively managing the anxiety and panic attacks.Patient currently denies both suicidal or homicidal ideation.  She also denies auditory or visual hallucination.  Patient reports no lynette or hypomania.  Patient to return to clinic in 4 weeks for follow-up.    PSYCHIATRIC HISTORY:   History of Psychiatric Hospitalizations:   - Inpatient: 2005 at [facility] related to cutting herself  - IOP/PHP/Day treatment: None  History of Suicidal Ideation: Positive  History of Suicide Attempts:  Positive by cutting herself   History of Self-injurious Behavior: Reports history of SIB by cutting most recent in 2017.  Current:  No  History of Violence/Aggression: Negative  History of Commitment? Negative  Electroconvulsive Therapy (ECT):Negative    PSYCHIATRIC REVIEW OF SYSTEMS:   Psychiatric Review of Systems:   Depression:   Reports: depressed mood, decreased interest, changes in sleep, changes in appetite, guilt, hopelessness, helplessness, impaired concentration, decreased energy, irritability.  Lynette:   Denies: sleeplessness, increased goal-directed activities, abrupt increase in energy pressured speech  Psychosis:   Denies: visual hallucinations, auditory hallucinations, paranoia  Anxiety:  "  Reports: excessive worries that are difficult to control, panic attacks  PTSD:   Reports: re-experiencing past trauma, nightmares, increased arousal, avoidance of traumatic stimuli, impaired function.  Denies: re-experiencing past trauma, nightmares, increased arousal, avoidance of traumatic stimuli, impaired function.  OCD:   Denies: obsessions, checking, symmetry, cleaning, skin picking.  Eating Disorder:   Denies: restriction, binging, purging.    Sleep:       MEDICATIONS                                                                                                Current Outpatient Medications   Medication Sig     busPIRone (BUSPAR) 10 MG tablet Take 1 tablet (10 mg) by mouth 2 times daily     lamoTRIgine (LAMICTAL) 150 MG tablet Take 1 tablet (150 mg) by mouth daily     lamoTRIgine (LAMICTAL) 25 MG tablet Take 2 tablets (50 mg) by mouth daily For two weeks, then take 4 tablets (100 mg) after two weeks     lurasidone (LATUDA) 40 MG TABS tablet Take 1 tablet (40 mg) by mouth daily with food     QUEtiapine (SEROQUEL) 50 MG tablet TAKE ONE TABLET BY MOUTH AT BEDTIME     valACYclovir (VALTREX) 1000 mg tablet Take 2 tablets (2,000 mg) by mouth 2 times daily for 1 day     No current facility-administered medications for this visit.       DRUG MONITORING:  [place] Prescription Monitoring Program evaluating controlled substances in the last year in [place]:  [place] review was not needed today..      PAST PSYCHOTROPIC MEDICATIONS:  Prozac, Celexa, Lithium, Trileptal, Seroquel, Zoloft, Lexapro    VITALS   There were no vitals taken for this visit.     BP Readings from Last 1 Encounters:   05/05/22 113/72     Pulse Readings from Last 1 Encounters:   05/05/22 93     Wt Readings from Last 1 Encounters:   05/05/22 61.2 kg (135 lb)     Ht Readings from Last 1 Encounters:   05/05/22 1.588 m (5' 2.52\")     Estimated body mass index is 24.28 kg/m  as calculated from the following:    Height as of " "5/5/22: 1.588 m (5' 2.52\").    Weight as of 5/5/22: 61.2 kg (135 lb).      PERTINENT HISTORY   PAST MEDICAL HISTORY:   Past Medical History:   Diagnosis Date     Moderate episode of recurrent major depressive disorder (H)        PAST SURGICAL HISTORY:   Past Surgical History:   Procedure Laterality Date     wisdom teeth         FAMILY HISTORY:   Family History   Problem Relation Age of Onset     Cancer Mother         Lung Cancer     Hypertension Mother      Hyperlipidemia Mother        SOCIAL HISTORY:   Social History     Tobacco Use     Smoking status: Former     Smokeless tobacco: Never   Vaping Use     Vaping status: Never Used   Substance Use Topics     Alcohol use: Yes         Seizures or Head Injury: Denies history of head injury. Denies history of seizures.  History of cardiac disease, rheumatic fever, fainting or dizziness, especially with exercise, seizures, chest pain or shortness of breath with exercise, unexplained change in exercise tolerance, palpitations, high blood pressure, or heart murmur?   No    LABS & IMAGING                                                                                                                Personally reviewed  No lab results found.  No lab results found.  No lab results found.  No lab results found.  No results found for: BGP566, VREA111, [id number], VITD3, D2VIT, D3VIT, DTOT, [id number], [id number], [id number], [id number], [id number], [id number]     ALLERGY & IMMUNIZATIONS     No Known Allergies    FAMILY MEDICAL HISTORY:     Family History     Problem (# of Occurrences) Relation (Name,Age of Onset)    Cancer (1) Mother: Lung Cancer    Hypertension (1) Mother    Hyperlipidemia (1) Mother            Family history of sudden or unexplained death or an event requiring resuscitation in children or young adults, cardiac arrhythmias (eg, Braulio-Parkinson-White syndrome), long QT syndrome, catecholaminergic paroxysmal ventricular tachycardia, Brugada syndrome, " "arrhythmogenic right ventricular dysplasia, hypertrophic cardiomyopathy, dilated cardiomyopathy, or Marfan syndrome?  No    FAMILY PSYCHIATRIC HISTORY:   Psychiatry:Depression runs in father's side  Substance use history in family: Negative  Family suicide history:Negative      SIGNIFICANT SOCIAL/FAMILY HISTORY:                                           Born and raised in: [place]  Relationship status: Single but engaged  Children: None    Highest education level was:Bachelor's degree   Service:Denies  Employment status:  of a software company  LEGAL:     SUBSTANCE USE HISTORY    Tobacco use: Quit smoking in 2021  Caffeine: 1 cup of coffee once weekly  Current alcohol: Drinks socially about once a week  Current substance use:Denies  Past use alcohol/substance use:Marijuana, methamphetamine, cocaine      MEDICAL REVIEW OF SYSTEMS:   Ten system review was completed with pertinent positives noted above    MENTAL STATUS EXAM:   Mental Status Examination (limited due to video virtual visit format):  Vital Signs: There were no vitals taken for this virtual visit.  Appearance: adequately groomed, appears stated age, and in no apparent distress.  Attitude: cooperative   Eye Contact: good to the extent that can be determined in a video visit  Muscle Strength and Tone: no gross abnormalities based on remote observation  Psychomotor Behavior:  no evidence of tardive dyskinesia, dystonia, or tics based on remote observation  Gait and Station: normal, no gross abnormalities based on remote observation  Speech: clear, coherent, normal prosody, regular rate, regular rhythm and fluent  Associations: No loosening of associations  Thought Process: coherent and goal directed  Thought Content: no evidence of suicidal ideation or homicidal ideation, no evidence of psychotic thought, no auditory hallucinations present and no visual hallucinations present  Mood: \"good\"  Affect: appropriate and in normal " range  Insight: good  Judgment: intact, adequate for safety  Impulse Control: intact  Oriented to: time, place, person and situation  Attention Span and Concentration: normal  Language: Intact  Recent and Remote Memory: intact to interview. Not formally assessed. No amnesia.  Fund of Knowledge: appropriate        SAFETY   Feels safe in home: Yes   Suicidal ideation: Denies  History of suicide attempts:  No   Hx of impulsivity: No     DSM 5 DIAGNOSIS:   1. Bipolar 1 disorder, mixed, moderate (H)    2. ANU (generalized anxiety disorder)    3. Moderate episode of recurrent major depressive disorder (H)    ASSESSMENT AND PLAN    Patient is a 37 year old,   Not  or  female with a history of bipolar 1,mixed, moderate, generalized anxiety disorder, and moderate episode of recurrent major depressive disorder who presents for follow-up visit.  Patient had some rough days about a week ago due to psychosocial stress related to job stress.  She became extremely anxious in addition to being experiencing both emotional and more reactivity.  She engaged in SIB by cutting her left hand with razor blade.  She is currently denying urges for SIB.  She was recently started on Latuda 40 mg at evening which she started taking about 2 days ago.  I started her on Klonopin 0.5 mg daily as needed for heightened anxiety and panic attacks.  I discussed medication side effects, risk, and benefits with the patient.  Patient verbalized good understanding and she is in agreement to taking Klonopin to help manage anxiety and panic attacks.  I also increased buspirone to 15 mg twice daily to further help with anxiety and mood.  Patient currently denied SI, SIB, HI.  She also denied auditory or visual hallucination.  Patient reports no amber or psychosis. [facility]   Patient will return to clinic in 4 weeks for follow-up.    Plan:  1.Patient will take the  medications as prescribed.   Med  Ications: Continue Latuda 40 mg at dinner time for mood, anxiety and depression  Take Klonopin 0.5 mg daily as needed for anxiety and panic attacks (15 tabs/month)   Continue Lamictal 150 mg for mood, anxiety and depression  Take Buspar 15 mg twice daily for anxiety  Continue Seroquel 50 mg at bedtime for mood anxiety and sleep  Continue Hydroxyzine 25 mg three times daily as needed for anxiety  Patient will not stop taking medications or adjust them without consulting with the provider.  2.Patient will call with any problems between visits.  3.Patient will go to the emergency room if not feeling safe , unable to function in the community, or if suicidal, homicidal or hearing voices or having paranoia.  4.Patient will abstain from drugs and alcohol./Pt denies use .  5.Patient will not drive if sedated on medications or under influence of any substance.    6.Patient will not mix psychiatric medications with drugs and alcohol.   7.Patient will watch her diet and exercise.  8.Patient will see non psychiatric providers for non psychiatric disorders.  9. Next appointment in one  month     Risk Assessment:     [place] has notable risk factors for self-harm, including, single status, anxiety, hx of suicide attempt and SIB,  and passive SI. However, risk is mitigated by ability to volunteer a safety plan and history of seeking help when needed. Additional steps taken to minimize risk include making medication adjustment, asking patient to call 911 and go to the ER if not able to stay safe at home,  Therefore, based on all available evidence including the factors cited above, Ryanne does not appear to be an imminent danger to self or others and does not meet criteria 72 hour hold. However, if patient uses substances or is non-adherent with medication, their risk of decompensation and SI/HI will be elevated. This was discussed with the patient as she verbalized good  understanding.  CONSULTS/REFERRALS:   Continue therapy  None at this time  Coordinate care with therapist as needed    MEDICAL:   None at this time  Coordinate care with PCP (Jaclyn Kelly) as needed  Follow up with primary care provider as planned or for acute medical concerns.    PSYCHOEDUCATION:  Medication side effects and alternatives reviewed. Health promotion activities recommended and reviewed today. All questions addressed. Education and counseling completed regarding risks and benefits of medications and psychotherapy options.  Consent provided by patient/guardian  Call the psychiatric nurse line with medication questions or concerns at [phone].  Paid To Party LLChart may be used to communicate with your provider, but this is not intended to be used for emergencies.  BLACK BOX WARNING: Discussed the Food and Drug Administration (FDA) requires that all antidepressants carry a warning that some children, adolescents and young adults may be at increased risk of suicide when taking antidepressants. Anyone taking an antidepressant should be watched closely for worsening depression or unusual behavior especially in the first few weeks after starting an SSRI. Keep in mind, antidepressants are more likely to reduce suicide risk in the long run by improving mood.   SEROTONIN SYNDROME:  Discussed risks of Serotonin syndrome (ie, serotonin toxicity) which is a potentially life-threatening condition associated with increased serotonergic activity in the central nervous system (CNS). It is seen with therapeutic medication use, inadvertent interactions between drugs, and intentional self-poisoning. Serotonin syndrome may involve a spectrum of clinical findings, which often include mental status changes, autonomic hyperactivity, and neuromuscular abnormalities.    BENZODIAZEPINE:  discussion on how benzos work and the need to use them short term due to potential of anxiety getting.  This is a controlled substance with risk for  abuse, need to keep in a safe keep place and cannot replace lost scripts.    HYPNOTIC USE: Hypnotic use, risk for CNS depression, sleep-walking, not to mix with ETOH or other CNS depressant, need for six hours of sleep, stop if change in mood.  This is a controlled substance with risk for abuse, need to keep in a safe keep place and cannot replace lost scripts.  FIRST GENERATION ANTIPSYCHOTIC/ SECOND GENERATION ANTIPSYCHOTIC USE:  Atypical need for cardiometabolic monitoring with medication- B/P, weight, blood sugar, cholesterol.  Need to monitor for abnormal movements taught  SAFETY:  We all care about your loved one's safety. To reduce the risk of self-harm, remove access to all:  Firearms, Medicines (both prescribed and over-the-counter), Knives and other sharp objects, Ropes and like materials, and Alcohol  SLEEP HYGIENE: establish a sleep routine, limit screen time 1 hour prior to bed, use bed for sleep only, take sleep/medications on time (including sleepy time tea, trazadone or herbal treatments such as melatonin), aroma therapy, limit caffeine/sugar, yoga, guided imagery, stretch, meditation, limit naps to 20 minutes, make a temperature change in the room, white noise, be mindful of slowing down breathing, take a warm bath/shower, frequently wash sheets, and journaling.   Medlineplus.gov is information for patients.  It is run by the National Library of Medicine and it contains information about all disorders, diseases and all medications.      COMMUNITY RESOURCES:    CRISIS NUMBERS: Provided in AVS 5/5/2023  National Suicide Prevention Lifeline: 6-516-531-TALK (815-389-9058)  "[employer]/resources for a list of additional resources (SOS)            [facility] - [phone]   Urgent Care Adult Mental Hkojnb-[phone] mobile unit/ 24/7 crisis line  [facility] -[phone]   COPE 24/7 [place] Mobile Team -[phone] (adults)/ [phone] (child)  Poison Control  [place] - [phone]    OR  go to nearest ER  Crisis Text Line for any crisis  send this-   To: 994101   [facility] ([facility]) [facility] ER  [phone]  National Suicide Prevention Lifeline: 757.544.2231 (TTY: 616.808.6662). Call anytime for help.  (www.suicidepreventionlifeline.org)  National [employer] Mental Illness (www.dio.org): 582.262.9063 or 282-012-2139.   Mental Health Association (www.mentalhealth.org): 771.416.4005 or 311-863-3584.  [place] Crisis Text Line: Text [place] to 962695  Suicide LifeLine Chat: Ujogo.org/chat    ADMINISTRATIVE BILLIN Time spent interviewing patient, reviewing referral documents, obtaining and reviewing outside records, communication with other health specialists, and preparing this report on this day: 23    Video/Phone Start Time:2:33 pm  Video/Phone End Time: 2:53 pm    Greater than 50% of time was spent in counseling and coordination of care regarding above diagnoses and treatment plan.    Patient Status:  Our psychiatry providers act as a specialty service for Primary Care Providers in the [facility] that seek to optimize medications for unstable patients.  Once medications have been optimized, our providers discharge the patient back to the referring Primary Care Provider for ongoing medication management.  This type of system allows our providers to serve a high volume of patients. At this time  Patient will continue to be seen for ongoing consultation and stabilization.    Signed:   Corby Jaeger, MSN, APRN, PMHNP-BC  Long Term Outpatient Psychiatry  Chart documentation done in part with Dragon Voice Recognition software.  Although reviewed after completion, some word and grammatical errors may remain.  Answers for HPI/ROS submitted by the patient on 2/10/2023  If you checked off any problems, how difficult have these problems made it for you to do your work, take care of things at home, or get along with other  people?: Somewhat difficult  PHQ9 TOTAL SCORE: 17  ANU 7 TOTAL SCORE: 14    Answers for HPI/ROS submitted by the patient on 3/15/2023  If you checked off any problems, how difficult have these problems made it for you to do your work, take care of things at home, or get along with other people?: Somewhat difficult  PHQ9 TOTAL SCORE: 13  ANU 7 TOTAL SCORE: 19    Answers for HPI/ROS submitted by the patient on 4/12/2023  If you checked off any problems, how difficult have these problems made it for you to do your work, take care of things at home, or get along with other people?: Somewhat difficult  PHQ9 TOTAL SCORE: 10  ANU 7 TOTAL SCORE: 11    Answers for HPI/ROS submitted by the patient on 5/4/2023  ANU 7 TOTAL SCORE: 14

## 2023-05-05 NOTE — PATIENT INSTRUCTIONS
"Patient Education   The [facility]  What to Expect  Here's what to expect in the [facility].   About the program  You'll be meeting with a psychiatric doctor to check your mental health. A psychiatric doctor helps you deal with troubling thoughts and feelings by giving you medicine. They'll make sure you know the plan for your care. You may see them for a long time. When you're feeling better, they may refer you back to seeing your family doctor.   If you have any questions, we'll be glad to talk to you.  About visits  Be open  At your visits, please talk openly about your problems. It may feel hard, but it's the best way for us to help you.  Cancelling visits  If you can't come to your visit, please call us right away at [phone]. If you don't cancel at least 24 hours (1 full day) before your visit, that's \"late cancellation.\"  Not showing up for your visits  Being very late is the same as not showing up. You'll be a \"no show\" if:  You're more than 15 minutes late for a 30-minute (half hour) visit.  You're more than 30 minutes late for a 60-minute (full hour) visit.  If you cancel late or don't show up 2 times within 6 months, we may end your care.  Getting help between visits  If you need help between visits, you can call us Monday to Friday from 8 a.m. to 4:30 p.m. at [phone].  Emergency care  Call 911 or go to the nearest emergency department if your life or someone else's life is in danger.  Call 988 anytime to reach the national Suicide and Crisis hotline.  Medicine refills  To refill your medicine, call your pharmacy. You can also call [facility] at [phone], Monday to Friday, 8 a.m. to 4:30 p.m. It can take 1 to 3 business days to get a refill.   Forms, letters, and tests  You may have papers to fill out, like FMLA, short-term disability, and workability. We can help you with these forms at your visits, but you must have an " appointment. You may need more than 1 visit for this, to be in an intensive therapy program, or both.  Before we can give you medicine for ADHD, we may refer you to get tested for it or confirm it another way.  We may not be able to give you an emotional support animal letter.  We don't do mental health checks ordered by the court.   We don't do mental health testing, but we can refer you to get tested.   Thank you for choosing us for your care.  For informational purposes only. Not to replace the advice of your health care provider. Copyright   2022 [facility]. All rights reserved. [employer] 214215 - 12/22.       Plan:  1.Patient will take the medications as prescribed.   Med  Ications: Continue Latuda 40 mg at dinner time for mood, anxiety and deression  Take Klonopin 0.5 mg daily as needed for anxiety and panic attacks (15 tabs/month)   Continue Lamictal 150 mg for mood, anxiety and depression  Take Buspar 15 mg twice daily for anxiety  Continue Seroquel 50 mg at bedtime for mood anxiety and sleep  Continue Hydroxyzine 25 mg three times daily as needed for anxiety  Patient will not stop taking medications or adjust them without consulting with the provider.  2.Patient will call with any problems between visits.  3.Patient will go to the emergency room if not feeling safe , unable to function in the community, or if suicidal, homicidal or hearing voices or having paranoia.  4.Patient will abstain from drugs and alcohol./Pt denies use .  5.Patient will not drive if sedated on medications or under influence of any substance.    6.Patient will not mix psychiatric medications with drugs and alcohol.   7.Patient will watch her diet and exercise.  8.Patient will see non psychiatric providers for non psychiatric disorders.  9. Next appointment in one  month

## 2023-05-05 NOTE — NURSING NOTE
Is the patient currently in the state of [place]? YES    Visit mode:VIDEO    If the visit is dropped, the patient can be reconnected by: VIDEO VISIT: Send to e-mail at: [email or web address]    Will anyone else be joining the visit? NO      How would you like to obtain your AVS? MyChart    Are changes needed to the allergy or medication list? NO    Reason for visit: Video Visit

## 2023-05-05 NOTE — PROGRESS NOTES
Virtual Visit Details    Type of service:  Video Visit     Originating Location (pt. Location): Other work    Distant Location (provider location):  On-site  Platform used for Video Visit: [employer]

## 2023-05-07 RX ORDER — BUSPIRONE HYDROCHLORIDE 15 MG/1
15 TABLET ORAL 2 TIMES DAILY
Qty: 60 TABLET | Refills: 1 | Status: SHIPPED | OUTPATIENT
Start: 2023-05-07 | End: 2023-07-10

## 2023-05-08 ASSESSMENT — PATIENT HEALTH QUESTIONNAIRE - PHQ9
SUM OF ALL RESPONSES TO PHQ QUESTIONS 1-9: 23
10. IF YOU CHECKED OFF ANY PROBLEMS, HOW DIFFICULT HAVE THESE PROBLEMS MADE IT FOR YOU TO DO YOUR WORK, TAKE CARE OF THINGS AT HOME, OR GET ALONG WITH OTHER PEOPLE: SOMEWHAT DIFFICULT
SUM OF ALL RESPONSES TO PHQ QUESTIONS 1-9: 23

## 2023-05-09 ENCOUNTER — HOSPITAL ENCOUNTER (OUTPATIENT)
Dept: BEHAVIORAL HEALTH | Facility: CLINIC | Age: 38
Discharge: HOME | End: 2023-05-09
Attending: FAMILY MEDICINE | Admitting: FAMILY MEDICINE
Payer: COMMERCIAL

## 2023-05-09 PROCEDURE — 90791 PSYCH DIAGNOSTIC EVALUATION: CPT | Mod: GT,95 | Performed by: COUNSELOR

## 2023-05-09 RX ORDER — HYDROXYZINE HYDROCHLORIDE 25 MG/1
TABLET, FILM COATED ORAL
COMMUNITY
Start: 2023-02-28

## 2023-05-09 ASSESSMENT — COLUMBIA-SUICIDE SEVERITY RATING SCALE - C-SSRS
3. HAVE YOU BEEN THINKING ABOUT HOW YOU MIGHT KILL YOURSELF?: YES
REASONS FOR IDEATION LIFETIME: DOES NOT APPLY
2. HAVE YOU ACTUALLY HAD ANY THOUGHTS OF KILLING YOURSELF IN THE PAST MONTH?: YES
2. HAVE YOU ACTUALLY HAD ANY THOUGHTS OF KILLING YOURSELF LIFETIME?: NO
4. HAVE YOU HAD THESE THOUGHTS AND HAD SOME INTENTION OF ACTING ON THEM?: NO
6. HAVE YOU EVER DONE ANYTHING, STARTED TO DO ANYTHING, OR PREPARED TO DO ANYTHING TO END YOUR LIFE?: YES
1. IN THE PAST MONTH, HAVE YOU WISHED YOU WERE DEAD OR WISHED YOU COULD GO TO SLEEP AND NOT WAKE UP?: YES
5. HAVE YOU STARTED TO WORK OUT OR WORKED OUT THE DETAILS OF HOW TO KILL YOURSELF? DO YOU INTEND TO CARRY OUT THIS PLAN?: NO
1. IN THE PAST MONTH, HAVE YOU WISHED YOU WERE DEAD OR WISHED YOU COULD GO TO SLEEP AND NOT WAKE UP?: YES

## 2023-05-09 ASSESSMENT — PAIN SCALES - GENERAL: PAINLEVEL: NO PAIN (0)

## 2023-05-09 NOTE — PROGRESS NOTES
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 Protestant / spiritual influences relevant to therapy at this time.     3. Initial Treatment will focus on:    Depressed Mood -    Anxiety -    Mood Instability -    Risk Management / Safety Concerns related to: Self-harm ideation and Suicidal ideation.       4. Resources/Service Plan:    services are not indicated.   Modifications to assist communication are not indicated.   Additional disability accommodations are not indicated.      5. Collaboration:   Collaboration / coordination of treatment will be initiated with the following support professionals: Targeted Case Management (TCM). Viki Goff (sister) [phone]     6.  Referrals:   The following referral(s) will be initiated: Outpatient [facility]. Next Scheduled Appointment: Date: Wednesday, 5/24/2023  Time: 9:00 am - 10:00 am  Provider: Shreya Francis MS, LP  Location: [facility], [address] [place] [address], [address], [place] [address]  Phone: [phone]  Type: Teletherapy.      A Release of Information has been obtained for the following: Targeted Case Management (TCM).     Emergency Contact FOUZIA was obtained.      Clinical Substantiation/medical necessity for the above recommendations:  Patient is  a 37-year-old heterosexual  single female who presents with an anxiety disorder; a bipolar disorder; depression; a personality disorder. Patient is seeking services to get her anxiety in check and to create a plan for my depression which has gotten increasingly worse the last few weeks.  Patient reports psychiatric hospitalizations at [facility] Inpatient: 2005 at [facility] related to cutting herself. Patient has received mental health services in the past:  therapy; psychiatry; other Hospitalization.  Patient denies a history of civil commitment.  Currently, patient is receiving other mental health services.  These include psychiatry with Guanakito Jaeger.  Next appointment: 6/5/23. Patient also reports seeing having a therapy apt scheduled in September 2023.     Patient endorses with Change in sleep, Lack of interest, Excessive or inappropriate guilt, change in energy level, Difficulties concentrating, change in appetite, Psychomotor slowing or agitation, Suicidal ideation, Feelings of hopelessness, Feelings of helplessness, Low self-worth, Ruminations, Irritability, feeling sad, down, or depressed, Withdrawn, Poor hygiene, Frequent crying, Anger outbursts and Self-injurious behavior. Excessive worry, Nervousness, Physical complaints, such as headaches, stomachaches, muscle tension, social anxiety, Fears/phobias does not like big crowd of people and feeling uncomfortable, Sleep disturbance, Psychomotor agitation, Ruminations, Poor concentration, and Irritability. Palpitations, Tremors, Shortness of breath, Tingling, Numbness and Sense of impending doom. Elevated mood, Irritability, Racing thoughts, Increased activity, decreased need for sleep, Pressured speech, Restlessness, Distractibility, and Impulsiveness.      Pt reports SI and prior suicide attempt, most recent attempt 18 years ago. She can engage in safety planning and identifies numerous protective factors. Patient agrees with referral to  [facility] with Shreya Francis MS, LP on 5/24/23 at 9:00 AM. Patient's acute suicide risk was determined to be moderate due to the following factors: Admission of current suicidal ideation with method, denies any intent or plan and has past suicidal behaviors. Patient is not currently under the influence of alcohol or illicit substances, denies experiencing command hallucinations, and has no direct access to firearms. Patient's acute risk could be higher if noncompliant with treatment plan, medications, follow-up appointments or using illicit substances or alcohol. Protective factors include forward or future oriented thinking; dedication to family or friends; effectively controls impulses. Patient reports current suicidal ideation with method, denies any intent or plan and has past suicidal behaviors and is not currently using illicit substances, however, a safety plan was developed. Patient instructed to present to her nearest emergency room if symptoms exacerbate.       7. MARILUZ:    MARILUZ:  Discussed the general effects of drugs and alcohol on health and well-being. Provider gave patient printed information about the effects of chemical use on her health and well being. Recommendations:  none.     8. Records:   These were not available for review at time of assessment.   Information in this assessment was obtained from the medical record and  provided by patient who is a good historian. Patient will have open access to her mental health medical record.    9.   Interactive Complexity: No      Provider Name/ Credentials:  GABE Huerta, [facility]  [facility]ual   Phone: [phone]  Fax: [phone]    May 9, 2023

## 2023-05-09 NOTE — ADDENDUM NOTE
Encounter addended by: Sai Quiros, [facility], [facility] on: 5/9/2023 2:51 PM   Actions taken: Clinical Note Signed

## 2023-05-30 ENCOUNTER — VIRTUAL VISIT (OUTPATIENT)
Dept: BEHAVIORAL HEALTH | Facility: CLINIC | Age: 38
End: 2023-05-30
Payer: COMMERCIAL

## 2023-05-30 DIAGNOSIS — F33.2 SEVERE EPISODE OF RECURRENT MAJOR DEPRESSIVE DISORDER, WITHOUT PSYCHOTIC FEATURES (H): Primary | ICD-10-CM

## 2023-05-30 ASSESSMENT — COLUMBIA-SUICIDE SEVERITY RATING SCALE - C-SSRS
TOTAL  NUMBER OF INTERRUPTED ATTEMPTS SINCE LAST CONTACT: NO
SUICIDE, SINCE LAST CONTACT: NO
6. HAVE YOU EVER DONE ANYTHING, STARTED TO DO ANYTHING, OR PREPARED TO DO ANYTHING TO END YOUR LIFE?: NO
2. HAVE YOU ACTUALLY HAD ANY THOUGHTS OF KILLING YOURSELF?: NO
TOTAL  NUMBER OF ABORTED OR SELF INTERRUPTED ATTEMPTS SINCE LAST CONTACT: NO
1. SINCE LAST CONTACT, HAVE YOU WISHED YOU WERE DEAD OR WISHED YOU COULD GO TO SLEEP AND NOT WAKE UP?: YES
ATTEMPT SINCE LAST CONTACT: NO

## 2023-05-30 NOTE — PROGRESS NOTES
Discharge Summary  Single Session    Client Name: Ryanne Goff MRN#: [id number] YOB: 1985    Discharge Date:   May 30, 2023    Service Modality: Video Visit:      Provider verified identity through the following two step process.  Patient provided:  Patient  and Patient address    Telemedicine Visit: The patient's condition can be safely assessed and treated via synchronous audio and visual telemedicine encounter.      Reason for Telemedicine Visit: Patient convenience (e.g. access to timely appointments / distance to available provider)    Originating Site (Patient Location): Patient's home    Distant Site (Provider Location): [facility]    Consent:  The patient/guardian has verbally consented to: the potential risks and benefits of telemedicine (video visit) versus in person care; bill my insurance or make self-payment for services provided; and responsibility for payment of non-covered services.     Patient would like the video invitation sent by:  My Chart    Mode of Communication:  Video Conference via [employer]    Distant Location (Provider):  On-site    As the provider I attest to compliance with applicable laws and regulations related to telemedicine.    Service Type: Individual      Session Start Time: 1105  Session End Time: 1155      Session Length: 45 - 50     Session #: 01     Attendees: Client      Focus of Treatment Objective(s):  Client's presenting concerns included: Mood Instability - low lows and high highs along with dignostic confusion  Stage of Change at time of Discharge: PREPARATION (Decided to change - considering how)    Medication Adherence:  Yes    Chemical Use:  NA    Assessment: Current Emotional / Mental Status (status of significant symptoms):    Risk status (Self / Other harm or suicidal ideation)  Client denies current fears or concerns for personal safety.  Client reports the following  current or recent suicidal ideation or behaviors: SI at baseline several days/week.  Client denies current or recent homicidal ideation or behaviors.  Client reports current or recent self injurious behavior or ideation including pt endorsed hx of cutting with one cutting event last month..  Client denies other safety concerns.  A safety and risk management plan has not been developed at this time, however client was given the after-hours number should there be a change in any of these risk factors.    Appearance:   Appropriate   Eye Contact:   Good   Psychomotor Behavior: Normal   Attitude:   Cooperative   Orientation:   All  Speech   Rate / Production: Normal    Volume:  Normal   Mood:    Normal  Affect:    Appropriate   Thought Content:  Clear   Thought Form:  Coherent  Logical   Insight:   Good     DSM5 Diagnoses: (Sustained by DSM5 Criteria Listed Above)  Diagnoses: 296.33 (F33.2) Major Depressive Disorder, Recurrent Episode, Severe _ and With melancholic features  300.02 (F41.1) Generalized Anxiety Disorder  Psychosocial & Contextual Factors: 37-year-old -American female, employed FT, positive support system, intelligent, hx of mental health diagnoses managed through therapy and medication, increased duration and intensity of depression prompting renewed investment seeking services.   WHODAS 2.0 (12 item) Score: no data    Reason for Discharge:  Referred to Shreya Francis from [facility]      Aftercare Plan:  Client will participate in individual therapy, med management, and DBT (referral placed by current therapist, Shreya Francis)      NALDO Bhandari, [facility]  May 30, 2023

## 2023-05-31 ENCOUNTER — MYC REFILL (OUTPATIENT)
Dept: PSYCHIATRY | Facility: CLINIC | Age: 38
End: 2023-05-31
Payer: COMMERCIAL

## 2023-05-31 DIAGNOSIS — F41.1 GAD (GENERALIZED ANXIETY DISORDER): ICD-10-CM

## 2023-05-31 DIAGNOSIS — F33.1 MODERATE EPISODE OF RECURRENT MAJOR DEPRESSIVE DISORDER (H): ICD-10-CM

## 2023-05-31 DIAGNOSIS — F31.62 BIPOLAR 1 DISORDER, MIXED, MODERATE (H): ICD-10-CM

## 2023-05-31 RX ORDER — QUETIAPINE FUMARATE 50 MG/1
50 TABLET, FILM COATED ORAL AT BEDTIME
Qty: 30 TABLET | Refills: 1 | Status: SHIPPED | OUTPATIENT
Start: 2023-05-31 | End: 2023-06-05

## 2023-05-31 NOTE — TELEPHONE ENCOUNTER
Date of Last Office Visit: 5/5/23  Date of Next Office Visit: 6/5/23 She will be out on 6/4/23 and is going out of town on 6/7.  No shows since last visit: 0  Cancellations since last visit: 0    Medication requested: QUEtiapine (SEROQUEL) 50 MG tablet Date last ordered: 4/27/23 Qty: 30 Refills: 0     Review of MN ?: NA    Lapse in medication adherence greater than 5 days?: No  If yes, call patient and gather details: na  Medication refill request verified as identical to current order?: yes  Result of Last DAM, VPA, Li+ Level, CBC, or Carbamazepine Level (at or since last visit): N/A    Last visit treatment plan:    Continue Latuda 40 mg at dinner time for mood, anxiety and depression  Take Klonopin 0.5 mg daily as needed for anxiety and panic attacks (15 tabs/month)   Continue Lamictal 150 mg for mood, anxiety and depression  Take Buspar 15 mg twice daily for anxiety  Continue Seroquel 50 mg at bedtime for mood anxiety and sleep  Continue Hydroxyzine 25 mg three times daily as needed for anxiety    [x]Medication refilled per  Medication Refill in Ambulatory Care  policy.  []Medication unable to be refilled by RN due to criteria not met as indicated below:    []Eligibility - not seen in the last year   []Supervision - no future appointment   []Compliance - no shows, cancellations or lapse in therapy   []Verification - order discrepancy   []Controlled medication   []Medication not included in policy   []90-day supply request   []Other

## 2023-06-05 ENCOUNTER — VIRTUAL VISIT (OUTPATIENT)
Dept: PSYCHIATRY | Facility: CLINIC | Age: 38
End: 2023-06-05
Payer: COMMERCIAL

## 2023-06-05 DIAGNOSIS — F31.62 BIPOLAR 1 DISORDER, MIXED, MODERATE (H): ICD-10-CM

## 2023-06-05 DIAGNOSIS — F41.1 GAD (GENERALIZED ANXIETY DISORDER): ICD-10-CM

## 2023-06-05 DIAGNOSIS — F33.2 SEVERE EPISODE OF RECURRENT MAJOR DEPRESSIVE DISORDER, WITHOUT PSYCHOTIC FEATURES (H): Primary | ICD-10-CM

## 2023-06-05 PROCEDURE — 99214 OFFICE O/P EST MOD 30 MIN: CPT | Mod: VID | Performed by: NURSE PRACTITIONER

## 2023-06-05 RX ORDER — QUETIAPINE FUMARATE 50 MG/1
50 TABLET, FILM COATED ORAL
Qty: 30 TABLET | Refills: 1 | COMMUNITY
Start: 2023-06-05

## 2023-06-05 NOTE — PROGRESS NOTES
"PSYCHIATRIC PROGRESS NOTE     Name:  Ryanne Goff  : 1985    Ryanne Goff is a 37 year old female who is being evaluated via a billable Video visit.      Telemedicine Visit: The patient's condition can be safely assessed and treated via synchronous audio and visual telemedicine encounter.      Reason for Telemedicine Visit: COVID 19 pandemic and the social and physical recommendations by the CDC and MD., Patient has requested telehealth visit, and Patient unable to travel      Originating Site (Patient Location): Patient's home    Distant Site (Provider Location): [facility] Outpatient Setting: [facility]    Consent:  The patient/guardian has verbally consented to: the potential risks and benefits of telemedicine (video visit or phone) versus in person care; bill my insurance or make self-payment for services provided; and responsibility for payment of non-covered services.     Mode of Communication:  Dialective platform     As the provider I attest to compliance with applicable laws and regulations related to telemedicine.    Date of Last Visit: 23                                          CHIEF COMPLAINT     \"I am doing better  HISTORY OF PRESENT ILLNESS     Patient who was last seen in the clinic on 23 returned today for follow up visit.  Patient reports she has noticed much improvement in symptoms since taking Latuda 40 mg at bedtime and increasing Lamictal to 150 mg at bedtime.  Patient reports mood has been more regulated with fewer days with low moods.  Patient reports she is no longer having urges to engage in self-injurious behavior.  Patient also states that she has been utilizing as needed Klonopin to help with anxiety whenever she flies on a plane.  Patient does mention she ha been  experiencing tiredness in the evening while sleeping too much at night.  I encouraged patient to take Seroquel 50 mg as needed instead of every night.  Patient verbalized good " understanding.  Patient reports she continues to gain benefit from individual psychotherapy.  Patient also reports she has been referred for DBT.  Patient currently denies both suicidal or homicidal ideation.  She also denies auditory or visual hallucination.  Patient reports no lynette or hypomania.  Patient to return to clinic in 4 weeks for follow-up.    PSYCHIATRIC HISTORY:   History of Psychiatric Hospitalizations:   - Inpatient: 2005 at [facility] related to cutting herself  - IOP/PHP/Day treatment: None  History of Suicidal Ideation: Positive  History of Suicide Attempts:  Positive by cutting herself   History of Self-injurious Behavior: Reports history of SIB by cutting most recent in 2017.  Current:  No  History of Violence/Aggression: Negative  History of Commitment? Negative  Electroconvulsive Therapy (ECT):Negative    PSYCHIATRIC REVIEW OF SYSTEMS:   Psychiatric Review of Systems:   Depression:   Reports: depressed mood, decreased interest, changes in sleep, changes in appetite, guilt, hopelessness, helplessness, impaired concentration, decreased energy, irritability.  Lynette:   Denies: sleeplessness, increased goal-directed activities, abrupt increase in energy pressured speech  Psychosis:   Denies: visual hallucinations, auditory hallucinations, paranoia  Anxiety:   Reports: excessive worries that are difficult to control, panic attacks  PTSD:   Reports: re-experiencing past trauma, nightmares, increased arousal, avoidance of traumatic stimuli, impaired function.  Denies: re-experiencing past trauma, nightmares, increased arousal, avoidance of traumatic stimuli, impaired function.  OCD:   Denies: obsessions, checking, symmetry, cleaning, skin picking.  Eating Disorder:   Denies: restriction, binging, purging.    Sleep:       MEDICATIONS                                                                                                Current Outpatient Medications   Medication Sig     busPIRone (BUSPAR)  "15 MG tablet Take 1 tablet (15 mg) by mouth 2 times daily     clonazePAM (KLONOPIN) 0.5 MG tablet Take 1 tablet (0.5 mg) by mouth daily as needed for anxiety (and panic attacks)     hydrOXYzine (ATARAX) 25 MG tablet      lamoTRIgine (LAMICTAL) 150 MG tablet Take 1 tablet (150 mg) by mouth daily     lurasidone (LATUDA) 40 MG TABS tablet Take 1 tablet (40 mg) by mouth daily with food     QUEtiapine (SEROQUEL) 50 MG tablet Take 1 tablet (50 mg) by mouth At Bedtime     valACYclovir (VALTREX) 1000 mg tablet Take 2 tablets (2,000 mg) by mouth 2 times daily for 1 day     No current facility-administered medications for this visit.       DRUG MONITORING:  [place] Prescription Monitoring Program evaluating controlled substances in the last year in [place]:  [place] Pres[place] [] review was not needed today..      PAST PSYCHOTROPIC MEDICATIONS:  Prozac, Celexa, Lithium, Trileptal, Seroquel, Zoloft, Lexapro    VITALS   There were no vitals taken for this visit.     BP Readings from Last 1 Encounters:   05/05/22 113/72     Pulse Readings from Last 1 Encounters:   05/05/22 93     Wt Readings from Last 1 Encounters:   05/05/22 61.2 kg (135 lb)     Ht Readings from Last 1 Encounters:   05/05/22 1.588 m (5' 2.52\")     Estimated body mass index is 24.28 kg/m  as calculated from the following:    Height as of 5/5/22: 1.588 m (5' 2.52\").    Weight as of 5/5/22: 61.2 kg (135 lb).      PERTINENT HISTORY   PAST MEDICAL HISTORY:   Past Medical History:   Diagnosis Date     Moderate episode of recurrent major depressive disorder (H)        PAST SURGICAL HISTORY:   Past Surgical History:   Procedure Laterality Date     wisdom teeth         FAMILY HISTORY:   Family History   Problem Relation Age of Onset     Cancer Mother         Lung Cancer     Hypertension Mother      Hyperlipidemia Mother      Depression Father      Anxiety Disorder Sister        SOCIAL HISTORY:   Social History     Tobacco Use     Smoking status: Former "     Smokeless tobacco: Never   Vaping Use     Vaping status: Never Used   Substance Use Topics     Alcohol use: Yes         Seizures or Head Injury: Denies history of head injury. Denies history of seizures.  History of cardiac disease, rheumatic fever, fainting or dizziness, especially with exercise, seizures, chest pain or shortness of breath with exercise, unexplained change in exercise tolerance, palpitations, high blood pressure, or heart murmur?   No    LABS & IMAGING                                                                                                                Personally reviewed  No lab results found.  No lab results found.  No lab results found.  No lab results found.  No results found for: GTA219, CFBJ663, [id number], VITD3, D2VIT, D3VIT, DTOT, [id number], [id number], [id number], [id number], [id number], [id number]     ALLERGY & IMMUNIZATIONS     No Known Allergies    FAMILY MEDICAL HISTORY:     Family History     Problem (# of Occurrences) Relation (Name,Age of Onset)    Anxiety Disorder (1) Sister    Cancer (1) Mother: Lung Cancer    Depression (1) Father    Hypertension (1) Mother    Hyperlipidemia (1) Mother            Family history of sudden or unexplained death or an event requiring resuscitation in children or young adults, cardiac arrhythmias (eg, Braulio-Parkinson-White syndrome), long QT syndrome, catecholaminergic paroxysmal ventricular tachycardia, Brugada syndrome, arrhythmogenic right ventricular dysplasia, hypertrophic cardiomyopathy, dilated cardiomyopathy, or Marfan syndrome?  No    FAMILY PSYCHIATRIC HISTORY:   Psychiatry:Depression runs in father's side  Substance use history in family: Negative  Family suicide history:Negative      SIGNIFICANT SOCIAL/FAMILY HISTORY:                                           Born and raised in: [place]  Relationship status: Single but engaged  Children: None    Highest education level was:Bachelor's degree    "Service:Denies  Employment status:  of a software company  LEGAL:     SUBSTANCE USE HISTORY    Tobacco use: Quit smoking in 2021  Caffeine: 1 cup of coffee once weekly  Current alcohol: Drinks socially about once a week  Current substance use:Denies  Past use alcohol/substance use:Marijuana, methamphetamine, cocaine      MEDICAL REVIEW OF SYSTEMS:   Ten system review was completed with pertinent positives noted above    MENTAL STATUS EXAM:   Mental Status Examination (limited due to video virtual visit format):  Vital Signs: There were no vitals taken for this virtual visit.  Appearance: adequately groomed, appears stated age, and in no apparent distress.  Attitude: cooperative   Eye Contact: good to the extent that can be determined in a video visit  Muscle Strength and Tone: no gross abnormalities based on remote observation  Psychomotor Behavior:  no evidence of tardive dyskinesia, dystonia, or tics based on remote observation  Gait and Station: normal, no gross abnormalities based on remote observation  Speech: clear, coherent, normal prosody, regular rate, regular rhythm and fluent  Associations: No loosening of associations  Thought Process: coherent and goal directed  Thought Content: no evidence of suicidal ideation or homicidal ideation, no evidence of psychotic thought, no auditory hallucinations present and no visual hallucinations present  Mood: \"good\"  Affect: appropriate and in normal range  Insight: good  Judgment: intact, adequate for safety  Impulse Control: intact  Oriented to: time, place, person and situation  Attention Span and Concentration: normal  Language: Intact  Recent and Remote Memory: intact to interview. Not formally assessed. No amnesia.  Fund of Knowledge: appropriate        SAFETY   Feels safe in home: Yes   Suicidal ideation: Denies  History of suicide attempts:  No   Hx of impulsivity: No     DSM 5 DIAGNOSIS:   1. Bipolar 1 disorder, mixed, moderate (H)    2. ANU " (generalized anxiety disorder)    3. Moderate episode of recurrent major depressive disorder (H)    ASSESSMENT AND PLAN    Patient is a 37 year old,   Not  or  female with a history of bipolar 1,mixed, moderate, generalized anxiety disorder, and moderate episode of recurrent major depressive disorder who presents for follow-up visit.  She has noticed improvement in symptoms since started taking Latuda.  She is no longer experiencing mood and emotional reactivities.  She has not had urges for self-injurious behavior.  Although she still experiences low mood, the frequency has significantly reduced compared to few weeks ago.  Due to increased tiredness in the eving period And sleeping too much at night, I encouraged her to only take Seroquel as needed instead of every night.  She currently denied SI, SIB, HI.  She also denied auditory or visual hallucination.  Patient reports no amber or psychosis. Patient reports gaining benefit from ongoing psychotherapy.  She also has a referral to starting DBT. Patient will return to clinic in 4 weeks for follow-up.    Plan:  1.Patient will take the medications as prescribed.   Med  Ications: Continue Latuda 40 mg at dinner time for mood, anxiety and depression  Continue Klonopin 0.5 mg daily as needed for anxiety and panic attacks (15 tabs/month)   Continue Lamictal 150 mg for mood, anxiety and depression  Continue Buspar 15 mg twice daily for anxiety  Take  Seroquel 50 mg at bedtime as needed for mood anxiety and sleep  Continue Hydroxyzine 25 mg three times daily as needed for anxiety  Patient will not stop taking medications or adjust them without consulting with the provider.  2.Patient will call with any problems between visits.  3.Patient will go to the emergency room if not feeling safe , unable to function in the community, or if suicidal, homicidal or hearing voices or having paranoia.  4.Patient will abstain from drugs and alcohol./Pt denies use  .  5.Patient will not drive if sedated on medications or under influence of any substance.    6.Patient will not mix psychiatric medications with drugs and alcohol.   7.Patient will watch her diet and exercise.  8.Patient will see non psychiatric providers for non psychiatric disorders.  9. Next appointment in one  month     Risk Assessment:     Logan has notable risk factors for self-harm, including, single status, anxiety, hx of suicide attempt and SIB,  and passive SI. However, risk is mitigated by ability to volunteer a safety plan and history of seeking help when needed. Additional steps taken to minimize risk include making medication adjustment, asking patient to call 911 and go to the ER if not able to stay safe at home,  Therefore, based on all available evidence including the factors cited above, Ryanne does not appear to be an imminent danger to self or others and does not meet criteria 72 hour hold. However, if patient uses substances or is non-adherent with medication, their risk of decompensation and SI/HI will be elevated. This was discussed with the patient as she verbalized good understanding.  CONSULTS/REFERRALS:   Continue therapy  None at this time  Coordinate care with therapist as needed    MEDICAL:   None at this time  Coordinate care with PCP (Jaclyn Kelly) as needed  Follow up with primary care provider as planned or for acute medical concerns.    PSYCHOEDUCATION:  Medication side effects and alternatives reviewed. Health promotion activities recommended and reviewed today. All questions addressed. Education and counseling completed regarding risks and benefits of medications and psychotherapy options.  Consent provided by patient/guardian  Call the psychiatric nurse line with medication questions or concerns at [phone].  [employer] network[employer] may be used to communicate with your provider, but this is not intended to be used for emergencies.  BLACK BOX WARNING: Discussed the Food and Drug Administration  (FDA) requires that all antidepressants carry a warning that some children, adolescents and young adults may be at increased risk of suicide when taking antidepressants. Anyone taking an antidepressant should be watched closely for worsening depression or unusual behavior especially in the first few weeks after starting an SSRI. Keep in mind, antidepressants are more likely to reduce suicide risk in the long run by improving mood.   SEROTONIN SYNDROME:  Discussed risks of Serotonin syndrome (ie, serotonin toxicity) which is a potentially life-threatening condition associated with increased serotonergic activity in the central nervous system (CNS). It is seen with therapeutic medication use, inadvertent interactions between drugs, and intentional self-poisoning. Serotonin syndrome may involve a spectrum of clinical findings, which often include mental status changes, autonomic hyperactivity, and neuromuscular abnormalities.    BENZODIAZEPINE:  discussion on how benzos work and the need to use them short term due to potential of anxiety getting.  This is a controlled substance with risk for abuse, need to keep in a safe keep place and cannot replace lost scripts.    HYPNOTIC USE: Hypnotic use, risk for CNS depression, sleep-walking, not to mix with ETOH or other CNS depressant, need for six hours of sleep, stop if change in mood.  This is a controlled substance with risk for abuse, need to keep in a safe keep place and cannot replace lost scripts.  FIRST GENERATION ANTIPSYCHOTIC/ SECOND GENERATION ANTIPSYCHOTIC USE:  Atypical need for cardiometabolic monitoring with medication- B/P, weight, blood sugar, cholesterol.  Need to monitor for abnormal movements taught  SAFETY:  We all care about your loved one's safety. To reduce the risk of self-harm, remove access to all:  Firearms, Medicines (both prescribed and over-the-counter), Knives and other sharp objects, Ropes and like materials, and Alcohol  SLEEP HYGIENE:  establish a sleep routine, limit screen time 1 hour prior to bed, use bed for sleep only, take sleep/medications on time (including sleepy time tea, trazadone or herbal treatments such as melatonin), aroma therapy, limit caffeine/sugar, yoga, guided imagery, stretch, meditation, limit naps to 20 minutes, make a temperature change in the room, white noise, be mindful of slowing down breathing, take a warm bath/shower, frequently wash sheets, and journaling.   Medlineplus.gov is information for patients.  It is run by the [employer] Library of Medicine and it contains information about all disorders, diseases and all medications.      COMMUNITY RESOURCES:    CRISIS NUMBERS: Provided in AVS 2023  National Suicide Prevention Lifeline: 3-415-848-TALK (420-145-7276)  [employer]/resources for a list of additional resources (SOS)            [facility] - [phone]   Urgent Care Adult Mental Nzzhvm-[phone] mobile unit/  crisis line  [facility] -[phone]   COPE  [facility] Mobile Team -[phone] (adults)/ [phone] (child)  Poison Control Center - 1-877.500.1544    OR  go to nearest ER  Crisis Text Line for any crisis  send this-   To: 843081   [facility] ([facility]  [phone]  National Suicide Prevention Lifeline: 372.245.8308 (TTY: 867.104.6237). Call anytime for help.  (www.suicidepreventionlifeline.org)  National [place] on Mental Illness (www.dio.org): 653-758-7026 or 702-771-0752.   Mental Health Association (www.mentalhealth.org): 783.945.2655 or 461-613-8079.  [place] Crisis Text Line: Text [place] to 925912  Suicide LifeLine Chat: suicidepreASSURED INFORMATION SECURITY.org/chat    ADMINISTRATIVE BILLIN Time spent interviewing patient, reviewing referral documents, obtaining and reviewing outside records, communication with other health specialists, and preparing this report on this day: 23    Video/Phone Start Time:2:30  pm  Video/Phone End Time: 2:42 pm    Greater than 50% of time was spent in counseling and coordination of care regarding above diagnoses and treatment plan.    Patient Status:  Our psychiatry providers act as a specialty service for Primary Care Providers in the [facility] that seek to optimize medications for unstable patients.  Once medications have been optimized, our providers discharge the patient back to the referring Primary Care Provider for ongoing medication management.  This type of system allows our providers to serve a high volume of patients. At this time  Patient will continue to be seen for ongoing consultation and stabilization.    Signed:   Corby Jaeger, MSN, APRN, PMHNP-BC  Long Term Outpatient Psychiatry  Chart documentation done in part with Dragon Voice Recognition software.  Although reviewed after completion, some word and grammatical errors may remain.  Answers for HPI/ROS submitted by the patient on 2/10/2023  If you checked off any problems, how difficult have these problems made it for you to do your work, take care of things at home, or get along with other people?: Somewhat difficult  PHQ9 TOTAL SCORE: 17  ANU 7 TOTAL SCORE: 14    Answers for HPI/ROS submitted by the patient on 3/15/2023  If you checked off any problems, how difficult have these problems made it for you to do your work, take care of things at home, or get along with other people?: Somewhat difficult  PHQ9 TOTAL SCORE: 13  ANU 7 TOTAL SCORE: 19    Answers for HPI/ROS submitted by the patient on 4/12/2023  If you checked off any problems, how difficult have these problems made it for you to do your work, take care of things at home, or get along with other people?: Somewhat difficult  PHQ9 TOTAL SCORE: 10  ANU 7 TOTAL SCORE: 11    Answers for HPI/ROS submitted by the patient on 5/4/2023  ANU 7 TOTAL SCORE: 14    Answers for HPI/ROS submitted by the patient on 6/5/2023  If you checked off any problems, how  difficult have these problems made it for you to do your work, take care of things at home, or get along with other people?: Somewhat difficult  PHQ9 TOTAL SCORE: 10

## 2023-06-05 NOTE — NURSING NOTE
Is the patient currently in the state of [place]? YES    Visit mode:VIDEO    If the visit is dropped, the patient can be reconnected by: VIDEO VISIT: Text to cell phone:   Telephone Information:   Mobile [phone]       Will anyone else be joining the visit? No  (If patient encounters technical issues they should call [phone])    How would you like to obtain your AVS? MyChart    Are changes needed to the allergy or medication list? NO    Rooming Documentation: Attendance Guidelines - Care team has reviewed attendance agreement with patient. Patient advised that two failed appointments within 6 months may lead to termination of current episode of care.      Reason for visit: CORDELL Rogers

## 2023-06-05 NOTE — PROGRESS NOTES
Virtual Visit Details    Type of service:  Video Visit     Originating Location (pt. Location): Home    Distant Location (provider location):  On-site  Platform used for Video Visit: Shakeel

## 2023-06-06 ASSESSMENT — ANXIETY QUESTIONNAIRES
7. FEELING AFRAID AS IF SOMETHING AWFUL MIGHT HAPPEN: NOT AT ALL
IF YOU CHECKED OFF ANY PROBLEMS ON THIS QUESTIONNAIRE, HOW DIFFICULT HAVE THESE PROBLEMS MADE IT FOR YOU TO DO YOUR WORK, TAKE CARE OF THINGS AT HOME, OR GET ALONG WITH OTHER PEOPLE: SOMEWHAT DIFFICULT
1. FEELING NERVOUS, ANXIOUS, OR ON EDGE: MORE THAN HALF THE DAYS
GAD7 TOTAL SCORE: 10
8. IF YOU CHECKED OFF ANY PROBLEMS, HOW DIFFICULT HAVE THESE MADE IT FOR YOU TO DO YOUR WORK, TAKE CARE OF THINGS AT HOME, OR GET ALONG WITH OTHER PEOPLE?: SOMEWHAT DIFFICULT
GAD7 TOTAL SCORE: 10
6. BECOMING EASILY ANNOYED OR IRRITABLE: SEVERAL DAYS
4. TROUBLE RELAXING: MORE THAN HALF THE DAYS
2. NOT BEING ABLE TO STOP OR CONTROL WORRYING: MORE THAN HALF THE DAYS
GAD7 TOTAL SCORE: 10
5. BEING SO RESTLESS THAT IT IS HARD TO SIT STILL: SEVERAL DAYS
7. FEELING AFRAID AS IF SOMETHING AWFUL MIGHT HAPPEN: NOT AT ALL
3. WORRYING TOO MUCH ABOUT DIFFERENT THINGS: MORE THAN HALF THE DAYS

## 2023-06-12 ASSESSMENT — PATIENT HEALTH QUESTIONNAIRE - PHQ9
10. IF YOU CHECKED OFF ANY PROBLEMS, HOW DIFFICULT HAVE THESE PROBLEMS MADE IT FOR YOU TO DO YOUR WORK, TAKE CARE OF THINGS AT HOME, OR GET ALONG WITH OTHER PEOPLE: SOMEWHAT DIFFICULT
SUM OF ALL RESPONSES TO PHQ QUESTIONS 1-9: 8
SUM OF ALL RESPONSES TO PHQ QUESTIONS 1-9: 8

## 2023-06-13 ENCOUNTER — VIRTUAL VISIT (OUTPATIENT)
Dept: PSYCHOLOGY | Facility: CLINIC | Age: 38
End: 2023-06-13
Payer: COMMERCIAL

## 2023-06-13 ENCOUNTER — FCC EXTENDED DOCUMENTATION (OUTPATIENT)
Dept: PSYCHOLOGY | Facility: CLINIC | Age: 38
End: 2023-06-13

## 2023-06-13 DIAGNOSIS — F41.1 GENERALIZED ANXIETY DISORDER: Primary | ICD-10-CM

## 2023-06-13 PROCEDURE — 90834 PSYTX W PT 45 MINUTES: CPT | Mod: VID | Performed by: SOCIAL WORKER

## 2023-06-13 NOTE — PROGRESS NOTES
[facility]   Mental Health & Addiction Services     Progress Note - Initial Visit    Patient  Name:  Ryanne Goff Date: 2023         Service Type: Individual     Visit Start Time: 100  Visit End Time: 152    Visit #: 1    Attendees: Client attended alone    Service Modality:  Video Visit:      Provider verified identity through the following two step process.  Patient provided:  Patient  and Patient address    Telemedicine Visit: The patient's condition can be safely assessed and treated via synchronous audio and visual telemedicine encounter.      Reason for Telemedicine Visit: Patient has requested telehealth visit    Originating Site (Patient Location): Patient's home    Distant Site (Provider Location): Provider Remote Setting- Home Office    Consent:  The patient/guardian has verbally consented to: the potential risks and benefits of telemedicine (video visit) versus in person care; bill my insurance or make self-payment for services provided; and responsibility for payment of non-covered services.     Patient would like the video invitation sent by:  My Chart    Mode of Communication:  Video Conference via Amwell    Distant Location (Provider):  Off-site    As the provider I attest to compliance with applicable laws and regulations related to telemedicine.       DATA:   Interactive Complexity: No   Crisis: No     Presenting Concerns/  Current Stressors:   Depression and anxiety symptoms, significant trauma history, historical diagnosis of Bipolar 1 disorder      ASSESSMENT:  Mental Status Assessment:  Appearance:   Appropriate   Eye Contact:   Good   Psychomotor Behavior: Normal   Attitude:   Cooperative  Friendly Pleasant  Orientation:   All  Speech   Rate / Production: Normal/ Responsive   Volume:  Normal   Mood:    Normal  Affect:    Appropriate   Thought Content:  Clear   Thought Form:  Coherent  Logical   Insight:    Good       Safety Issues and Plan for Safety and  Risk Management:   [place] Suicide Severity Rating Scale (Lifetime/Recent)      5/9/2023    10:00 AM   [place] Suicide Severity Rating (Lifetime/Recent)   Wish to be Dead (Lifetime) Yes   Comments age 11-disapointing mom   Non-Specific Active Suicidal Thoughts (Lifetime) No   Most Severe Ideation Rating (Lifetime) 2   Most Severe Ideation Description (Lifetime) SA age 19-cutting, drinking and took vicodin plus    Frequency (Lifetime) 1   Duration (Lifetime) 1   Controllability (Lifetime) 2   Protective Factors  (Lifetime) 1   Reasons for Ideation (Lifetime) 0   Q1 Wished to be Dead (Past Month) yes   Q2 Suicidal Thoughts (Past Month) yes   Q3 Suicidal Thought Method yes   Q4 Suicidal Intent without Specific Plan no   Q5 Suicide Intent with Specific Plan no   Q6 Suicide Behavior (Lifetime) yes   Within the Past 3 Months? no   Level of Risk per Screen moderate risk     Patient denies current fears or concerns for personal safety.  Patient denies current or recent suicidal ideation or behaviors.  Patient denies current or recent homicidal ideation or behaviors.  Patient denies current or recent self injurious behavior or ideation.  Patient reports other safety concerns including recently self-harmed, was not an attempt for suicide, but to a way to feel relief.  A safety and risk management plan has been developed including: Patient has no change in safety concerns. Committed to safety and agreed to follow previously developed safety plan..  Patient consented to co-developed safety plan.  Safety and risk management plan was completed.  Patient agreed to use safety plan should any safety concerns arise.  A copy was given to the patient.  Patient reports there are no firearms in the house.     Diagnostic Criteria:  Generalized Anxiety Disorder  A. Excessive anxiety and worry about a number of events or activities (such as work or school performance).   B. The person finds it difficult to control the  worry.  C. Select 3 or more symptoms (required for diagnosis). Only one item is required in children.   - Restlessness or feeling keyed up or on edge.    - Being easily fatigued.    - Difficulty concentrating or mind going blank.    - Irritability.    - Muscle tension.    - Sleep disturbance (difficulty falling or staying asleep, or restless unsatisfying sleep).   D. The focus of the anxiety and worry is not confined to features of an Axis I disorder.  E. The anxiety, worry, or physical symptoms cause clinically significant distress or impairment in social, occupational, or other important areas of functioning.   F. The disturbance is not due to the direct physiological effects of a substance (e.g., a drug of abuse, a medication) or a general medical condition (e.g., hyperthyroidism) and does not occur exclusively during a Mood Disorder, a Psychotic Disorder, or a Pervasive Developmental Disorder.      DSM5 Diagnoses: (Sustained by DSM5 Criteria Listed Above)  Diagnoses: 300.02 (F41.1) Generalized Anxiety Disorder  Psychosocial & Contextual Factors: Ryanne is a 37 year old [place] American female.  She works full time from home and is engaged to a supportive partner.  Reports taking mental health medications and reaching out to stable support system when needed.  WHODAS 2.0 (12 item):       2/10/2023     2:15 PM   WHODAS 2.0 Total Score   Total Score    Total Score [facility]        Information is confidential and restricted. Go to Review Flowsheets to unlock data.     Intervention:   Completed through review of safety issues and safety interventions and Educated on treatment planning and started identifying goals and interventions for treatment plan; Ryanne has completed her own version of a safety plan, denies current SI, and declines completing a formal safety plan today.  Collateral Reports Completed:  Not Applicable      PLAN: (Homework, other):  1. Provider will continue Diagnostic Assessment.  Patient was given the  following to do until next session:  Continue to reach out to supports and use plan for safety when needed, reach out to me or psychiatrist with questions related to mental health    2. Provider recommended the following referrals: None at this time, possible DBT referral in future.      3.  Suicide Risk and Safety Concerns were assessed for Ryanne Goff.    Patient meets the following risk assessment and triage: Patient has no change in safety concerns. Committed to safety and agreed to follow previously developed safety plan.      Swapna Houser, [facility]  June 13, 2023        Answers for HPI/ROS submitted by the patient on 6/12/2023  If you checked off any problems, how difficult have these problems made it for you to do your work, take care of things at home, or get along with other people?: Somewhat difficult  PHQ9 TOTAL SCORE: 8  ANU 7 TOTAL SCORE: 10

## 2023-06-20 ASSESSMENT — ENCOUNTER SYMPTOMS
SHORTNESS OF BREATH: 0
HEMATOCHEZIA: 0
PARESTHESIAS: 1
COUGH: 0
NERVOUS/ANXIOUS: 1
BREAST MASS: 0
CHILLS: 0
MYALGIAS: 0
WEAKNESS: 0
HEMATURIA: 0
CONSTIPATION: 1
EYE PAIN: 0
FREQUENCY: 0
SORE THROAT: 0
PALPITATIONS: 0
DIARRHEA: 1
HEADACHES: 0
DIZZINESS: 0
DYSURIA: 0
JOINT SWELLING: 0
ARTHRALGIAS: 0
NAUSEA: 0
FEVER: 0
HEARTBURN: 0
ABDOMINAL PAIN: 0

## 2023-06-23 ENCOUNTER — OFFICE VISIT (OUTPATIENT)
Dept: FAMILY MEDICINE | Facility: CLINIC | Age: 38
End: 2023-06-23
Payer: COMMERCIAL

## 2023-06-23 VITALS
TEMPERATURE: 98.4 F | OXYGEN SATURATION: 98 % | HEART RATE: 59 BPM | RESPIRATION RATE: 16 BRPM | SYSTOLIC BLOOD PRESSURE: 102 MMHG | BODY MASS INDEX: 23.28 KG/M2 | DIASTOLIC BLOOD PRESSURE: 62 MMHG | WEIGHT: 131.4 LBS | HEIGHT: 63 IN

## 2023-06-23 DIAGNOSIS — Z13.220 SCREENING FOR HYPERLIPIDEMIA: ICD-10-CM

## 2023-06-23 DIAGNOSIS — Z13.1 SCREENING FOR DIABETES MELLITUS: ICD-10-CM

## 2023-06-23 DIAGNOSIS — Z00.00 ROUTINE HISTORY AND PHYSICAL EXAMINATION OF ADULT: Primary | ICD-10-CM

## 2023-06-23 PROCEDURE — 99385 PREV VISIT NEW AGE 18-39: CPT | Performed by: NURSE PRACTITIONER

## 2023-06-23 ASSESSMENT — ENCOUNTER SYMPTOMS
DYSURIA: 0
SHORTNESS OF BREATH: 0
NERVOUS/ANXIOUS: 1
EYE PAIN: 0
FREQUENCY: 0
PALPITATIONS: 0
COUGH: 0
SORE THROAT: 0
DIZZINESS: 0
ABDOMINAL PAIN: 0
FEVER: 0
JOINT SWELLING: 0
WEAKNESS: 0
HEADACHES: 0
BREAST MASS: 0
DIARRHEA: 1
CHILLS: 0
CONSTIPATION: 1
PARESTHESIAS: 1
MYALGIAS: 0
NAUSEA: 0
HEARTBURN: 0
HEMATOCHEZIA: 0
HEMATURIA: 0
ARTHRALGIAS: 0

## 2023-06-23 ASSESSMENT — PAIN SCALES - GENERAL: PAINLEVEL: NO PAIN (0)

## 2023-06-23 NOTE — PROGRESS NOTES
SUBJECTIVE:   CC: Ryanne is an 37 year old who presents for preventive health visit.       6/23/2023     4:10 PM   Additional Questions   Roomed by Viv BUCKNER     Healthy Habits:    Getting at least 3 servings of Calcium per day:  Yes    Bi-annual eye exam:  Yes    Dental care twice a year:  Yes    Sleep apnea or symptoms of sleep apnea:  Daytime drowsiness    Diet:  Regular (no restrictions)    Frequency of exercise:  2-3 days/week    Duration of exercise:  Other    Taking medications regularly:  Yes    Medication side effects:  Not applicable    PHQ-2 Total Score:    Additional concerns today:  Yes    Additional provider notes:    Discussed knee pain and numbness in hand.  L knee inside between knee cap pain, changes with weather.  Weight gain with Covid pandemic.  Alters how she walks.    4-5 months ago hand started going numb, left hand.  At first woke her up from sleep.  Comes and goes.  Just left hand.    Itchy skin with anxiety, dermatographism.    Spotting after intercourse with cervical fluid pink just started a couple weeks ago.  Long distance relationship.        Have you ever done Advance Care Planning? (For example, a Health Directive, POLST, or a discussion with a medical provider or your loved ones about your wishes): No, advance care planning information given to patient to review.  Patient plans to discuss their wishes with loved ones or provider.      Social History     Tobacco Use     Smoking status: Former     Smokeless tobacco: Never   Substance Use Topics     Alcohol use: Yes             6/20/2023    10:43 AM   Alcohol Use   Prescreen: >3 drinks/day or >7 drinks/week? No          No data to display              Reviewed orders with patient.  Reviewed health maintenance and updated orders accordingly - Yes  BP Readings from Last 3 Encounters:   06/23/23 102/62   05/05/22 113/72    Wt Readings from Last 3 Encounters:   06/23/23 59.6 kg (131 lb 6.4 oz)   05/05/22 61.2 kg (135 lb)                   Patient Active Problem List   Diagnosis     Moderate episode of recurrent major depressive disorder (H)     Severe episode of recurrent major depressive disorder, without psychotic features (H)     Past Surgical History:   Procedure Laterality Date     wisdom teeth         Social History     Tobacco Use     Smoking status: Former     Smokeless tobacco: Never   Substance Use Topics     Alcohol use: Yes     Family History   Problem Relation Age of Onset     Cancer Mother         Lung Cancer     Hypertension Mother      Hyperlipidemia Mother      Depression Father      Anxiety Disorder Sister          Current Outpatient Medications   Medication Sig Dispense Refill     busPIRone (BUSPAR) 15 MG tablet Take 1 tablet (15 mg) by mouth 2 times daily 60 tablet 1     clonazePAM (KLONOPIN) 0.5 MG tablet Take 1 tablet (0.5 mg) by mouth daily as needed for anxiety (and panic attacks) 15 tablet 0     hydrOXYzine (ATARAX) 25 MG tablet        lamoTRIgine (LAMICTAL) 150 MG tablet Take 1 tablet (150 mg) by mouth daily 30 tablet 1     lurasidone (LATUDA) 40 MG TABS tablet Take 1 tablet (40 mg) by mouth daily with food 30 tablet 1     QUEtiapine (SEROQUEL) 50 MG tablet Take 1 tablet (50 mg) by mouth nightly as needed (sleep and anxiety) 30 tablet 1     valACYclovir (VALTREX) 1000 mg tablet Take 2 tablets (2,000 mg) by mouth 2 times daily for 1 day (Patient not taking: Reported on 6/23/2023) 4 tablet 3     No Known Allergies    Breast Cancer Screening:    FHS-7:       6/20/2023    10:44 AM   Breast CA Risk Assessment (FHS-7)   Did any of your first-degree relatives have breast or ovarian cancer? Unknown   Did any of your relatives have bilateral breast cancer? Unknown   Did any man in your family have breast cancer? Unknown   Did any woman in your family have breast and ovarian cancer? Unknown   Did any woman in your family have breast cancer before age 50 y? Unknown   Do you have 2 or more relatives with breast and/or ovarian cancer?  "Unknown   Do you have 2 or more relatives with breast and/or bowel cancer? Unknown       Patient under 40 years of age: Routine Mammogram Screening not recommended.   Pertinent mammograms are reviewed under the imaging tab.    History of abnormal Pap smear: NO - age 30-65 PAP every 5 years with negative HPV co-testing recommended      Latest Ref Rng & Units 5/5/2022    10:08 AM   PAP / HPV   PAP  Negative for Intraepithelial Lesion or Malignancy (NILM)    HPV 16 DNA Negative Negative    HPV 18 DNA Negative Negative    Other HR HPV Negative Negative      Reviewed and updated as needed this visit by clinical staff    Allergies  Meds  Problems  Med Hx  Surg Hx  Fam Hx          Reviewed and updated as needed this visit by Provider     Meds  Problems  Med Hx  Surg Hx  Fam Hx           Review of Systems   Constitutional: Negative for chills and fever.   HENT: Negative for congestion, ear pain, hearing loss and sore throat.    Eyes: Negative for pain and visual disturbance.   Respiratory: Negative for cough and shortness of breath.    Cardiovascular: Negative for chest pain, palpitations and peripheral edema.   Gastrointestinal: Positive for constipation and diarrhea. Negative for abdominal pain, heartburn, hematochezia and nausea.   Breasts:  Negative for tenderness, breast mass and discharge.   Genitourinary: Positive for vaginal bleeding. Negative for dysuria, frequency, genital sores, hematuria, pelvic pain, urgency and vaginal discharge.   Musculoskeletal: Negative for arthralgias, joint swelling and myalgias.   Skin: Negative for rash.   Neurological: Positive for paresthesias. Negative for dizziness, weakness and headaches.   Psychiatric/Behavioral: Positive for mood changes. The patient is nervous/anxious.         OBJECTIVE:   /62 (BP Location: Right arm, Patient Position: Sitting, Cuff Size: Adult Regular)   Pulse 59   Temp 98.4  F (36.9  C) (Oral)   Resp 16   Ht 1.588 m (5' 2.5\")   Wt 59.6 " kg (131 lb 6.4 oz)   LMP 06/18/2023   SpO2 98%   BMI 23.65 kg/m    Physical Exam  GENERAL: healthy, alert and no distress  EYES: Eyes grossly normal to inspection, PERRL and conjunctivae and sclerae normal  HENT: ear canals and TM's normal, nose and mouth without ulcers or lesions  NECK: no adenopathy, no asymmetry, masses, or scars and thyroid normal to palpation  RESP: lungs clear to auscultation - no rales, rhonchi or wheezes  BREAST: normal without masses, tenderness or nipple discharge and no palpable axillary masses or adenopathy  CV: regular rate and rhythm, normal S1 S2, no S3 or S4, no murmur, click or rub, no peripheral edema and peripheral pulses strong  ABDOMEN: soft, nontender, no hepatosplenomegaly, no masses and bowel sounds normal   (female): normal female external genitalia, normal urethral meatus , vaginal mucosa pink, moist, well rugated, vaginal discharge - moderate and dark blood and normal cervix, adnexae, and uterus without masses.  MS: no gross musculoskeletal defects noted, no edema  SKIN: no suspicious lesions or rashes  NEURO: Normal strength and tone, mentation intact and speech normal  PSYCH: mentation appears normal, affect normal/bright    Diagnostic Test Results:  Labs reviewed in Epic    ASSESSMENT/PLAN:   Ryanne was seen today for physical.    Diagnoses and all orders for this visit:    Routine history and physical examination of adult    Screening for diabetes mellitus  -     Hemoglobin A1c; Future    Screening for hyperlipidemia  -     Lipid panel reflex to direct LDL Non-fasting; Future      She is getting her mental health medications managed by psychiatry.    COUNSELING:  Reviewed preventive health counseling, as reflected in patient instructions       Regular exercise       Healthy diet/nutrition        She reports that she has quit smoking. She has never used smokeless tobacco.          Angela Mccormick, [facility]  [facility]

## 2023-07-06 ENCOUNTER — VIRTUAL VISIT (OUTPATIENT)
Dept: PSYCHOLOGY | Facility: CLINIC | Age: 38
End: 2023-07-06
Payer: COMMERCIAL

## 2023-07-06 DIAGNOSIS — F41.1 GENERALIZED ANXIETY DISORDER: Primary | ICD-10-CM

## 2023-07-06 PROCEDURE — 90834 PSYTX W PT 45 MINUTES: CPT | Mod: VID | Performed by: SOCIAL WORKER

## 2023-07-06 ASSESSMENT — PATIENT HEALTH QUESTIONNAIRE - PHQ9
SUM OF ALL RESPONSES TO PHQ QUESTIONS 1-9: 1
SUM OF ALL RESPONSES TO PHQ QUESTIONS 1-9: 1

## 2023-07-06 NOTE — PROGRESS NOTES
[facility]   Mental Health & Addiction Services     Progress Note - Initial Visit    Patient  Name:  Ryanne Goff Date: 2023         Service Type: Individual     Visit Start Time: 700  Visit End Time:     Visit #: 1    Attendees: Client attended alone    Service Modality:  Video Visit:      Provider verified identity through the following two step process.  Patient provided:  Patient  and Patient address    Telemedicine Visit: The patient's condition can be safely assessed and treated via synchronous audio and visual telemedicine encounter.      Reason for Telemedicine Visit: Patient has requested telehealth visit    Originating Site (Patient Location): Patient's home    Distant Site (Provider Location): Provider Remote Setting- Home Office    Consent:  The patient/guardian has verbally consented to: the potential risks and benefits of telemedicine (video visit) versus in person care; bill my insurance or make self-payment for services provided; and responsibility for payment of non-covered services.     Patient would like the video invitation sent by:  My Chart    Mode of Communication:  Video Conference via Amwell    Distant Location (Provider):  Off-site    As the provider I attest to compliance with applicable laws and regulations related to telemedicine.       DATA:   Interactive Complexity: No   Crisis: No     Presenting Concerns/  Current Stressors:   Depression and anxiety symptoms, significant trauma history, historical diagnosis of Bipolar 1 disorder      ASSESSMENT:  Mental Status Assessment:  Appearance:   Appropriate   Eye Contact:   Good   Psychomotor Behavior: Normal   Attitude:   Cooperative  Friendly Pleasant  Orientation:   All  Speech   Rate / Production: Normal/ Responsive   Volume:  Normal   Mood:    Normal  Affect:    Appropriate   Thought Content:  Clear   Thought Form:  Coherent  Logical   Insight:    Good       Safety Issues and Plan for Safety and  Risk Management:   [place] Suicide Severity Rating Scale (Lifetime/Recent)      5/9/2023    10:00 AM   [place] Suicide Severity Rating (Lifetime/Recent)   Wish to be Dead (Lifetime) Yes   Comments age 11-disapointing mom   Non-Specific Active Suicidal Thoughts (Lifetime) No   Most Severe Ideation Rating (Lifetime) 2   Most Severe Ideation Description (Lifetime) SA age 19-cutting, drinking and took vicodin plus    Frequency (Lifetime) 1   Duration (Lifetime) 1   Controllability (Lifetime) 2   Protective Factors  (Lifetime) 1   Reasons for Ideation (Lifetime) 0   Q1 Wished to be Dead (Past Month) yes   Q2 Suicidal Thoughts (Past Month) yes   Q3 Suicidal Thought Method yes   Q4 Suicidal Intent without Specific Plan no   Q5 Suicide Intent with Specific Plan no   Q6 Suicide Behavior (Lifetime) yes   Within the Past 3 Months? no   Level of Risk per Screen moderate risk     Patient denies current fears or concerns for personal safety.  Patient denies current or recent suicidal ideation or behaviors.  Patient denies current or recent homicidal ideation or behaviors.  Patient denies current or recent self injurious behavior or ideation.  Patient reports other safety concerns including recently self-harmed, was not an attempt for suicide, but to a way to feel relief.  A safety and risk management plan has been developed including: Patient has no change in safety concerns. Committed to safety and agreed to follow previously developed safety plan..  Patient consented to co-developed safety plan.  Safety and risk management plan was completed.  Patient agreed to use safety plan should any safety concerns arise.  A copy was given to the patient.  Patient reports there are no firearms in the house.     Diagnostic Criteria:  Generalized Anxiety Disorder  A. Excessive anxiety and worry about a number of events or activities (such as work or school performance).   B. The person finds it difficult to control the  worry.  C. Select 3 or more symptoms (required for diagnosis). Only one item is required in children.   - Restlessness or feeling keyed up or on edge.    - Being easily fatigued.    - Difficulty concentrating or mind going blank.    - Irritability.    - Muscle tension.    - Sleep disturbance (difficulty falling or staying asleep, or restless unsatisfying sleep).   D. The focus of the anxiety and worry is not confined to features of an Axis I disorder.  E. The anxiety, worry, or physical symptoms cause clinically significant distress or impairment in social, occupational, or other important areas of functioning.   F. The disturbance is not due to the direct physiological effects of a substance (e.g., a drug of abuse, a medication) or a general medical condition (e.g., hyperthyroidism) and does not occur exclusively during a Mood Disorder, a Psychotic Disorder, or a Pervasive Developmental Disorder.      DSM5 Diagnoses: (Sustained by DSM5 Criteria Listed Above)  Diagnoses: 300.02 (F41.1) Generalized Anxiety Disorder  Psychosocial & Contextual Factors: Ryanne is a 37 year old [place] American female.  She works full time from home and is engaged to a supportive partner.  Reports taking mental health medications and reaching out to stable support system when needed.  WHODAS 2.0 (12 item):       2/10/2023     2:15 PM   WHODAS 2.0 Total Score   Total Score 28   Total Score [facility] 28     Intervention:   Completed through review of safety issues and safety interventions and Educated on treatment planning and started identifying goals and interventions for treatment plan; Ryanne has completed her own version of a safety plan, denies current SI, and declines completing a formal safety plan today.  Collateral Reports Completed:  Not Applicable      PLAN: (Homework, other):  1. Provider will continue Diagnostic Assessment.  Patient was given the following to do until next session:  Continue to reach out to supports and use plan  for safety when needed, reach out to me or psychiatrist with questions related to mental health    2. Provider recommended the following referrals: None at this time, possible DBT referral in future.      3.  Suicide Risk and Safety Concerns were assessed for Ryanne Goff.    Patient meets the following risk assessment and triage: Patient has no change in safety concerns. Committed to safety and agreed to follow previously developed safety plan.      Swapna Houser, [facility]  7/6/2023        Answers for HPI/ROS submitted by the patient on 6/12/2023  If you checked off any problems, how difficult have these problems made it for you to do your work, take care of things at home, or get along with other people?: Somewhat difficult  PHQ9 TOTAL SCORE: 8  ANU 7 TOTAL SCORE: 10

## 2023-07-07 ENCOUNTER — VIRTUAL VISIT (OUTPATIENT)
Dept: PSYCHIATRY | Facility: CLINIC | Age: 38
End: 2023-07-07
Payer: COMMERCIAL

## 2023-07-07 DIAGNOSIS — F31.62 BIPOLAR 1 DISORDER, MIXED, MODERATE (H): Primary | ICD-10-CM

## 2023-07-07 DIAGNOSIS — F41.1 GAD (GENERALIZED ANXIETY DISORDER): ICD-10-CM

## 2023-07-07 PROCEDURE — 99213 OFFICE O/P EST LOW 20 MIN: CPT | Mod: VID | Performed by: NURSE PRACTITIONER

## 2023-07-07 RX ORDER — LURASIDONE HYDROCHLORIDE 20 MG/1
20 TABLET, FILM COATED ORAL
Qty: 30 TABLET | Refills: 1 | Status: SHIPPED | OUTPATIENT
Start: 2023-07-07 | End: 2024-04-24 | Stop reason: SINTOL

## 2023-07-07 ASSESSMENT — PAIN SCALES - GENERAL: PAINLEVEL: NO PAIN (0)

## 2023-07-07 NOTE — PROGRESS NOTES
"PSYCHIATRIC PROGRESS NOTE     Name:  Ryanne Goff  : 1985    Ryanne Goff is a 37 year old female who is being evaluated via a billable Video visit.      Telemedicine Visit: The patient's condition can be safely assessed and treated via synchronous audio and visual telemedicine encounter.      Reason for Telemedicine Visit: COVID 19 pandemic and the social and physical recommendations by the CDC and MD., Patient has requested telehealth visit, and Patient unable to travel      Originating Site (Patient Location): Patient's home    Distant Site (Provider Location): [facility] Outpatient Setting: [facility]    Consent:  The patient/guardian has verbally consented to: the potential risks and benefits of telemedicine (video visit or phone) versus in person care; bill my insurance or make self-payment for services provided; and responsibility for payment of non-covered services.     Mode of Communication:  [employer] platform     As the provider I attest to compliance with applicable laws and regulations related to telemedicine.    Date of Last Visit: 23                                          CHIEF COMPLAINT     \"I am doing much better  HISTORY OF PRESENT ILLNESS     Patient who was last seen in the clinic on 23 returned today for follow up visit.  Patient reports she is doing great, stating both mood and anxiety under control with current medication regimen.  Patient stated she traveled by air a couple of times without having to using Klonopin for anxiety.  Patient stated she has not experienced mood and emotional dysregulation since last visit.  Patient reports that she has not engaged in any self-injurious behavior for the past few weeks.  Patient does mention she continues to experience side effects related to increased dizziness, drowsiness, inability to stay awake during the day while taking Latuda 40 mg.  I suggested reducing Latuda dose to 20 mg to further help with " medication side effect.  Patient verbalized good understanding and she is amenable to taking Latuda 20 mg to help with side effects.  Patient was reported she recently started a new less stressful job.  Patient reports she continues to attend individual psychotherapy, and reporting getting benefit from it.  Patient currently denies both suicidal or homicidal ideation.  She also denies auditory or visual hallucination.  Patient reports no lynette or hypomania.  Patient to return to clinic in 6 weeks for follow-up.    PSYCHIATRIC HISTORY:   History of Psychiatric Hospitalizations:   - Inpatient: 2005 at [facility] [place] [facility] related to cutting herself  - IOP/PHP/Day treatment: None  History of Suicidal Ideation: Positive  History of Suicide Attempts:  Positive by cutting herself   History of Self-injurious Behavior: Reports history of SIB by cutting most recent in 2017.  Current:  No  History of Violence/Aggression: Negative  History of Commitment? Negative  Electroconvulsive Therapy (ECT):Negative    PSYCHIATRIC REVIEW OF SYSTEMS:   Psychiatric Review of Systems:   Depression:   Reports: depressed mood, decreased interest, changes in sleep, changes in appetite, guilt, hopelessness, helplessness, impaired concentration, decreased energy, irritability.  Lynette:   Denies: sleeplessness, increased goal-directed activities, abrupt increase in energy pressured speech  Psychosis:   Denies: visual hallucinations, auditory hallucinations, paranoia  Anxiety:   Reports: excessive worries that are difficult to control, panic attacks  PTSD:   Reports: re-experiencing past trauma, nightmares, increased arousal, avoidance of traumatic stimuli, impaired function.  Denies: re-experiencing past trauma, nightmares, increased arousal, avoidance of traumatic stimuli, impaired function.  OCD:   Denies: obsessions, checking, symmetry, cleaning, skin picking.  Eating Disorder:   Denies: restriction, binging, purging.    Sleep:       MEDICATIONS       "                                                                                          Current Outpatient Medications   Medication Sig     busPIRone (BUSPAR) 15 MG tablet Take 1 tablet (15 mg) by mouth 2 times daily     clonazePAM (KLONOPIN) 0.5 MG tablet Take 1 tablet (0.5 mg) by mouth daily as needed for anxiety (and panic attacks)     hydrOXYzine (ATARAX) 25 MG tablet      lamoTRIgine (LAMICTAL) 150 MG tablet Take 1 tablet (150 mg) by mouth daily     lurasidone (LATUDA) 40 MG TABS tablet Take 1 tablet (40 mg) by mouth daily with food     QUEtiapine (SEROQUEL) 50 MG tablet Take 1 tablet (50 mg) by mouth nightly as needed (sleep and anxiety)     valACYclovir (VALTREX) 1000 mg tablet Take 2 tablets (2,000 mg) by mouth 2 times daily for 1 day (Patient not taking: Reported on 6/23/2023)     No current facility-administered medications for this visit.       DRUG MONITORING:  [place] Prescription Monitoring Program evaluating controlled substances in the last year in [place]:  [place] Pres[place] [] review was not needed today..      PAST PSYCHOTROPIC MEDICATIONS:  Prozac, Celexa, Lithium, Trileptal, Seroquel, Zoloft, Lexapro    VITALS   LMP 06/18/2023      BP Readings from Last 1 Encounters:   06/23/23 102/62     Pulse Readings from Last 1 Encounters:   06/23/23 59     Wt Readings from Last 1 Encounters:   06/23/23 59.6 kg (131 lb 6.4 oz)     Ht Readings from Last 1 Encounters:   06/23/23 1.588 m (5' 2.5\")     Estimated body mass index is 23.65 kg/m  as calculated from the following:    Height as of 6/23/23: 1.588 m (5' 2.5\").    Weight as of 6/23/23: 59.6 kg (131 lb 6.4 oz).      PERTINENT HISTORY   PAST MEDICAL HISTORY:   Past Medical History:   Diagnosis Date     Moderate episode of recurrent major depressive disorder (H)        PAST SURGICAL HISTORY:   Past Surgical History:   Procedure Laterality Date     wisdom teeth         FAMILY HISTORY:   Family History   Problem Relation Age of Onset     " Cancer Mother         Lung Cancer     Hypertension Mother      Hyperlipidemia Mother      Depression Father      Anxiety Disorder Sister        SOCIAL HISTORY:   Social History     Tobacco Use     Smoking status: Former     Smokeless tobacco: Never   Substance Use Topics     Alcohol use: Yes         Seizures or Head Injury: Denies history of head injury. Denies history of seizures.  History of cardiac disease, rheumatic fever, fainting or dizziness, especially with exercise, seizures, chest pain or shortness of breath with exercise, unexplained change in exercise tolerance, palpitations, high blood pressure, or heart murmur?   No    LABS & IMAGING                                                                                                                Personally reviewed  No lab results found.  No lab results found.  No lab results found.  No lab results found.  No results found for: [id number], [id number], [id number], VITD3, D2VIT, D3VIT, DTOT, [id number], [id number], [id number], [id number], [id number], [id number]     ALLERGY & IMMUNIZATIONS     No Known Allergies    FAMILY MEDICAL HISTORY:     Family History     Problem (# of Occurrences) Relation (Name,Age of Onset)    Anxiety Disorder (1) Sister    Cancer (1) Mother: Lung Cancer    Depression (1) Father    Hypertension (1) Mother    Hyperlipidemia (1) Mother            Family history of sudden or unexplained death or an event requiring resuscitation in children or young adults, cardiac arrhythmias (eg, Braulio-Parkinson-White syndrome), long QT syndrome, catecholaminergic paroxysmal ventricular tachycardia, Brugada syndrome, arrhythmogenic right ventricular dysplasia, hypertrophic cardiomyopathy, dilated cardiomyopathy, or Marfan syndrome?  No    FAMILY PSYCHIATRIC HISTORY:   Psychiatry:Depression runs in father's side  Substance use history in family: Negative  Family suicide history:Negative      SIGNIFICANT SOCIAL/FAMILY HISTORY:                                   "         Born and raised in: [place]  Relationship status: Single but engaged  Children: None    Highest education level was:Bachelor's degree   Service:Denies  Employment status:  of a software company  LEGAL:     SUBSTANCE USE HISTORY    Tobacco use: Quit smoking in 2021  Caffeine: 1 cup of coffee once weekly  Current alcohol: Drinks socially about once a week  Current substance use:Denies  Past use alcohol/substance use:Marijuana, methamphetamine, cocaine      MEDICAL REVIEW OF SYSTEMS:   Ten system review was completed with pertinent positives noted above    MENTAL STATUS EXAM:   Mental Status Examination (limited due to video virtual visit format):  Vital Signs: There were no vitals taken for this virtual visit.  Appearance: adequately groomed, appears stated age, and in no apparent distress.  Attitude: cooperative   Eye Contact: good to the extent that can be determined in a video visit  Muscle Strength and Tone: no gross abnormalities based on remote observation  Psychomotor Behavior:  no evidence of tardive dyskinesia, dystonia, or tics based on remote observation  Gait and Station: normal, no gross abnormalities based on remote observation  Speech: clear, coherent, normal prosody, regular rate, regular rhythm and fluent  Associations: No loosening of associations  Thought Process: coherent and goal directed  Thought Content: no evidence of suicidal ideation or homicidal ideation, no evidence of psychotic thought, no auditory hallucinations present and no visual hallucinations present  Mood: \"good\"  Affect: appropriate and in normal range  Insight: good  Judgment: intact, adequate for safety  Impulse Control: intact  Oriented to: time, place, person and situation  Attention Span and Concentration: normal  Language: Intact  Recent and Remote Memory: intact to interview. Not formally assessed. No amnesia.  Fund of Knowledge: appropriate        SAFETY   Feels safe in home: Yes "   Suicidal ideation: Denies  History of suicide attempts:  No   Hx of impulsivity: No     DSM 5 DIAGNOSIS:   1. Bipolar 1 disorder, mixed, moderate (H)    2. ANU (generalized anxiety disorder)    3. Moderate episode of recurrent major depressive disorder (H)    ASSESSMENT AND PLAN    Patient is a 37 year old,   Not  or  female with a history of bipolar 1,mixed, moderate, generalized anxiety disorder, and moderate episode of recurrent major depressive disorder who presents for follow-up visit.  She reports symptom stabilization on current medication regimen.  Both mood and anxiety under control with current medication.  She continues to experience tiredness, drowsiness and dizziness on Latuda 40 mg.  I reduce Latuda to 20 mg to further help with side effects.  She currently denied SI, SIB, HI.  She also denied auditory or visual hallucination.  Patient reports no amber or psychosis. Patient reports gaining benefit from ongoing psychotherapy.  She also has a referral to starting DBT. Patient will return to clinic in 4 weeks for follow-up.    Plan:  1.Patient will take the medications as prescribed.   Med  Ications: Take Latuda 20 mg at dinner time for mood, anxiety and depression  Continue Klonopin 0.5 mg daily as needed for anxiety and panic attacks (15 tabs/month)   Continue Lamictal 150 mg for mood, anxiety and depression  Continue Buspar 15 mg twice daily for anxiety  Take  Seroquel 50 mg at bedtime as needed for mood anxiety and sleep  Continue Hydroxyzine 25 mg three times daily as needed for anxiety  Patient will not stop taking medications or adjust them without consulting with the provider.  2.Patient will call with any problems between visits.  3.Patient will go to the emergency room if not feeling safe , unable to function in the community, or if suicidal, homicidal or hearing voices or having paranoia.  4.Patient will abstain from drugs and alcohol./Pt denies use .  5.Patient will not  drive if sedated on medications or under influence of any substance.    6.Patient will not mix psychiatric medications with drugs and alcohol.   7.Patient will watch her diet and exercise.  8.Patient will see non psychiatric providers for non psychiatric disorders.  9. Next appointment in 6 weeks    Risk Assessment:     Logan has notable risk factors for self-harm, including, single status, anxiety, hx of suicide attempt and SIB,  and passive SI. However, risk is mitigated by ability to volunteer a safety plan and history of seeking help when needed. Additional steps taken to minimize risk include making medication adjustment, asking patient to call 911 and go to the ER if not able to stay safe at home,  Therefore, based on all available evidence including the factors cited above, Ryanne does not appear to be an imminent danger to self or others and does not meet criteria 72 hour hold. However, if patient uses substances or is non-adherent with medication, their risk of decompensation and SI/HI will be elevated. This was discussed with the patient as she verbalized good understanding.  CONSULTS/REFERRALS:   Continue therapy  None at this time  Coordinate care with therapist as needed    MEDICAL:   None at this time  Coordinate care with PCP (Jaclyn Kelly) as needed  Follow up with primary care provider as planned or for acute medical concerns.    PSYCHOEDUCATION:  Medication side effects and alternatives reviewed. Health promotion activities recommended and reviewed today. All questions addressed. Education and counseling completed regarding risks and benefits of medications and psychotherapy options.  Consent provided by patient/guardian  Call the psychiatric nurse line with medication questions or concerns at [phone].  MyChart may be used to communicate with your provider, but this is not intended to be used for emergencies.  BLACK BOX WARNING: Discussed the Food and Drug Administration (FDA) requires that all  antidepressants carry a warning that some children, adolescents and young adults may be at increased risk of suicide when taking antidepressants. Anyone taking an antidepressant should be watched closely for worsening depression or unusual behavior especially in the first few weeks after starting an SSRI. Keep in mind, antidepressants are more likely to reduce suicide risk in the long run by improving mood.   SEROTONIN SYNDROME:  Discussed risks of Serotonin syndrome (ie, serotonin toxicity) which is a potentially life-threatening condition associated with increased serotonergic activity in the central nervous system (CNS). It is seen with therapeutic medication use, inadvertent interactions between drugs, and intentional self-poisoning. Serotonin syndrome may involve a spectrum of clinical findings, which often include mental status changes, autonomic hyperactivity, and neuromuscular abnormalities.    BENZODIAZEPINE:  discussion on how benzos work and the need to use them short term due to potential of anxiety getting.  This is a controlled substance with risk for abuse, need to keep in a safe keep place and cannot replace lost scripts.    HYPNOTIC USE: Hypnotic use, risk for CNS depression, sleep-walking, not to mix with ETOH or other CNS depressant, need for six hours of sleep, stop if change in mood.  This is a controlled substance with risk for abuse, need to keep in a safe keep place and cannot replace lost scripts.  FIRST GENERATION ANTIPSYCHOTIC/ SECOND GENERATION ANTIPSYCHOTIC USE:  Atypical need for cardiometabolic monitoring with medication- B/P, weight, blood sugar, cholesterol.  Need to monitor for abnormal movements taught  SAFETY:  We all care about your loved one's safety. To reduce the risk of self-harm, remove access to all:  Firearms, Medicines (both prescribed and over-the-counter), Knives and other sharp objects, Ropes and like materials, and Alcohol  SLEEP HYGIENE: establish a sleep routine,  limit screen time 1 hour prior to bed, use bed for sleep only, take sleep/medications on time (including sleepy time tea, trazadone or herbal treatments such as melatonin), aroma therapy, limit caffeine/sugar, yoga, guided imagery, stretch, meditation, limit naps to 20 minutes, make a temperature change in the room, white noise, be mindful of slowing down breathing, take a warm bath/shower, frequently wash sheets, and journaling.   Medlineplus.gov is information for patients.  It is run by the [employer] and it contains information about all disorders, diseases and all medications.      COMMUNITY RESOURCES:    CRISIS NUMBERS: Provided in AVS 2023  National Suicide Prevention Lifeline: 3-296-939-TALK (346-422-8693)  [place]/resources for a list of additional resources (SOS)            [facility] - [phone]   Urgent Care Adult Mental [facility]-[phone] mobile unit/  crisis line  [facility] -[phone]   COPE  [facility] Mobile Team -[phone] (adults)/ [phone] (child)  Poison Control Center - 1-338.932.2921    OR  go to nearest ER  Crisis Text Line for any crisis  send this-   To: 544084   [facility] ([facility]  [phone]  National Suicide Prevention Lifeline: 302.734.5970 (TTY: 797.168.6625). Call anytime for help.  (www.suicidepreventionlifeline.org)  National [place] on Mental Illness (www.dio.org): 859-083-7116 or 632-474-2228.   Mental Health Association (www.mentalhealth.org): 145-619-3127 or 225-556-6462.  [place] Crisis Text Line: Text [place] to 620257  Suicide LifeLine Chat: suicidepre[employer].org/chat    ADMINISTRATIVE BILLIN Time spent interviewing patient, reviewing referral documents, obtaining and reviewing outside records, communication with other health specialists, and preparing this report on this day: 23    Video/Phone Start Time:11:06 am  Video/Phone End Time: 11:15  am    Greater than 50% of time was spent in counseling and coordination of care regarding above diagnoses and treatment plan.    Patient Status:  Our psychiatry providers act as a specialty service for Primary Care Providers in the [facility] that seek to optimize medications for unstable patients.  Once medications have been optimized, our providers discharge the patient back to the referring Primary Care Provider for ongoing medication management.  This type of system allows our providers to serve a high volume of patients. At this time  Patient will continue to be seen for ongoing consultation and stabilization.    Signed:   Corby Jaeger, MSN, APRN, PMHNP-BC  Long Term Outpatient Psychiatry  Chart documentation done in part with Dragon Voice Recognition software.  Although reviewed after completion, some word and grammatical errors may remain.  Answers for HPI/ROS submitted by the patient on 2/10/2023  If you checked off any problems, how difficult have these problems made it for you to do your work, take care of things at home, or get along with other people?: Somewhat difficult  PHQ9 TOTAL SCORE: 17  ANU 7 TOTAL SCORE: 14    Answers for HPI/ROS submitted by the patient on 3/15/2023  If you checked off any problems, how difficult have these problems made it for you to do your work, take care of things at home, or get along with other people?: Somewhat difficult  PHQ9 TOTAL SCORE: 13  ANU 7 TOTAL SCORE: 19    Answers for HPI/ROS submitted by the patient on 4/12/2023  If you checked off any problems, how difficult have these problems made it for you to do your work, take care of things at home, or get along with other people?: Somewhat difficult  PHQ9 TOTAL SCORE: 10  ANU 7 TOTAL SCORE: 11    Answers for HPI/ROS submitted by the patient on 5/4/2023  ANU 7 TOTAL SCORE: 14    Answers for HPI/ROS submitted by the patient on 6/5/2023  If you checked off any problems, how difficult have these problems made it  for you to do your work, take care of things at home, or get along with other people?: Somewhat difficult  PHQ9 TOTAL SCORE: 10

## 2023-07-07 NOTE — PATIENT INSTRUCTIONS
"Patient Education   The Panel Psychiatry Program  What to Expect  Here's what to expect in the Panel Psychiatry Program.   About the program  You'll be meeting with a psychiatric doctor to check your mental health. A psychiatric doctor helps you deal with troubling thoughts and feelings by giving you medicine. They'll make sure you know the plan for your care. You may see them for a long time. When you're feeling better, they may refer you back to seeing your family doctor.   If you have any questions, we'll be glad to talk to you.  About visits  Be open  At your visits, please talk openly about your problems. It may feel hard, but it's the best way for us to help you.  Cancelling visits  If you can't come to your visit, please call us right away at [phone]. If you don't cancel at least 24 hours (1 full day) before your visit, that's \"late cancellation.\"  Not showing up for your visits  Being very late is the same as not showing up. You'll be a \"no show\" if:  You're more than 15 minutes late for a 30-minute (half hour) visit.  You're more than 30 minutes late for a 60-minute (full hour) visit.  If you cancel late or don't show up 2 times within 6 months, we may end your care.  Getting help between visits  If you need help between visits, you can call us Monday to Friday from 8 a.m. to 4:30 p.m. at [phone].  Emergency care  Call 911 or go to the nearest emergency department if your life or someone else's life is in danger.  Call 988 anytime to reach the national Suicide and Crisis hotline.  Medicine refills  To refill your medicine, call your pharmacy. You can also call [facility]'s Behavioral Access at [phone], Monday to Friday, 8 a.m. to 4:30 p.m. It can take 1 to 3 business days to get a refill.   Forms, letters, and tests  You may have papers to fill out, like FMLA, short-term disability, and workability. We can help you with these forms at your visits, but you must have an " appointment. You may need more than 1 visit for this, to be in an intensive therapy program, or both.  Before we can give you medicine for ADHD, we may refer you to get tested for it or confirm it another way.  We may not be able to give you an emotional support animal letter.  We don't do mental health checks ordered by the court.   We don't do mental health testing, but we can refer you to get tested.   Thank you for choosing us for your care.  For informational purposes only. Not to replace the advice of your health care provider. Copyright   2022 [facility]. All rights reserved. [employer] [id number] - 12/22.       Plan:  1.Patient will take the medications as prescribed.   Med  Ications: Take Latuda 20 mg at dinner time for mood, anxiety and depression  Continue Klonopin 0.5 mg daily as needed for anxiety and panic attacks (15 tabs/month)   Continue Lamictal 150 mg for mood, anxiety and depression  Continue Buspar 15 mg twice daily for anxiety  Take  Seroquel 50 mg at bedtime as needed for mood anxiety and sleep  Continue Hydroxyzine 25 mg three times daily as needed for anxiety  Patient will not stop taking medications or adjust them without consulting with the provider.  2.Patient will call with any problems between visits.  3.Patient will go to the emergency room if not feeling safe , unable to function in the community, or if suicidal, homicidal or hearing voices or having paranoia.  4.Patient will abstain from drugs and alcohol./Pt denies use .  5.Patient will not drive if sedated on medications or under influence of any substance.    6.Patient will not mix psychiatric medications with drugs and alcohol.   7.Patient will watch her diet and exercise.  8.Patient will see non psychiatric providers for non psychiatric disorders.  9. Next appointment in 6 weeks

## 2023-07-07 NOTE — NURSING NOTE
Is the patient currently in the state of [place]? YES    Visit mode:VIDEO    If the visit is dropped, the patient can be reconnected by: VIDEO VISIT:  Send e-mail to at [email or web address]    Will anyone else be joining the visit? No  (If patient encounters technical issues they should call [phone])    How would you like to obtain your AVS? MyChart    Are changes needed to the allergy or medication list? NO    Rooming Documentation: Assigned questionnaire(s) completed .    Reason for visit: RECHECK     CORDELL Ritchie           detailed exam

## 2023-07-13 ASSESSMENT — PATIENT HEALTH QUESTIONNAIRE - PHQ9
10. IF YOU CHECKED OFF ANY PROBLEMS, HOW DIFFICULT HAVE THESE PROBLEMS MADE IT FOR YOU TO DO YOUR WORK, TAKE CARE OF THINGS AT HOME, OR GET ALONG WITH OTHER PEOPLE: NOT DIFFICULT AT ALL
SUM OF ALL RESPONSES TO PHQ QUESTIONS 1-9: 1
SUM OF ALL RESPONSES TO PHQ QUESTIONS 1-9: 1

## 2023-07-14 ENCOUNTER — LAB (OUTPATIENT)
Dept: LAB | Facility: CLINIC | Age: 38
End: 2023-07-14
Payer: COMMERCIAL

## 2023-07-14 ENCOUNTER — VIRTUAL VISIT (OUTPATIENT)
Dept: PSYCHOLOGY | Facility: CLINIC | Age: 38
End: 2023-07-14
Payer: COMMERCIAL

## 2023-07-14 DIAGNOSIS — F31.62 BIPOLAR 1 DISORDER, MIXED, MODERATE (H): ICD-10-CM

## 2023-07-14 DIAGNOSIS — F43.10 PTSD (POST-TRAUMATIC STRESS DISORDER): Primary | ICD-10-CM

## 2023-07-14 DIAGNOSIS — F41.1 GAD (GENERALIZED ANXIETY DISORDER): ICD-10-CM

## 2023-07-14 DIAGNOSIS — Z13.1 SCREENING FOR DIABETES MELLITUS: ICD-10-CM

## 2023-07-14 DIAGNOSIS — Z13.220 SCREENING FOR HYPERLIPIDEMIA: ICD-10-CM

## 2023-07-14 LAB
CHOLEST SERPL-MCNC: 219 MG/DL
HBA1C MFR BLD: 5.4 % (ref 0–5.6)
HDLC SERPL-MCNC: 58 MG/DL
LDLC SERPL CALC-MCNC: 139 MG/DL
NONHDLC SERPL-MCNC: 161 MG/DL
TRIGL SERPL-MCNC: 111 MG/DL

## 2023-07-14 PROCEDURE — 83036 HEMOGLOBIN GLYCOSYLATED A1C: CPT

## 2023-07-14 PROCEDURE — 36415 COLL VENOUS BLD VENIPUNCTURE: CPT

## 2023-07-14 PROCEDURE — 80061 LIPID PANEL: CPT

## 2023-07-14 PROCEDURE — 90791 PSYCH DIAGNOSTIC EVALUATION: CPT | Mod: 95 | Performed by: SOCIAL WORKER

## 2023-07-14 NOTE — Clinical Note
Fernando Jaeger and Dr. Mccormick - I wanted to reach out as I've seen Ryanne for several therapy appointments and we were able to finish our DA today.  I just wanted to make sure we were all on the same page, and see if there are suggestions for things we might add to our treatment plan when we meet next.  Please see my DA from today if you have any questions.  My diagnoses match Dr. Jaeger's with the addition of fitting criteria for PTSD.  It sounds like the patient is actively trying to get into DBT programming which I think would be fantastic for her.  I will keep you both in the loop with any updates.  Thank you for working with this patient, looking forward to collaborating further.  Happy weekend wishes to you both,  Swapna Houser, [facility]

## 2023-07-14 NOTE — PROGRESS NOTES
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 Dissociation   Eating Disorder: No Symptoms  ADD / ADHD:  Inattentive, Difficulties listening, Poor task completion, Distractibility, Forgetful, Restlessness/fidgety and Hyperverbal  Conduct Disorder: No symptoms  Autism Spectrum Disorder: Inflexible adherence to routines  Obsessive Compulsive Disorder: No Symptoms    Patient reports the following compulsive behaviors and treatment history: None.      Diagnostic Criteria:   Generalized Anxiety Disorder  A. Excessive anxiety and worry about a number of events or activities (such as work or school performance).   B. The person finds it difficult to control the worry.  C. Select 3 or more symptoms (required for diagnosis). Only one item is required in children.   - Restlessness or feeling keyed up or on edge.    - Being easily fatigued.    - Difficulty concentrating or mind going blank.    - Irritability.    - Muscle tension.    - Sleep disturbance (difficulty falling or staying asleep, or restless unsatisfying sleep).   D. The focus of the anxiety and worry is not confined to features of an Axis I disorder.  E. The anxiety, worry, or physical symptoms cause clinically significant distress or impairment in social, occupational, or other important areas of functioning.   F. The disturbance is not due to the direct  physiological effects of a substance (e.g., a drug of abuse, a medication) or a general medical condition (e.g., hyperthyroidism) and does not occur exclusively during a Mood Disorder, a Psychotic Disorder, or a Pervasive Developmental Disorder. Bipolar I Hypomanic Episode  HYPOMANIC EPISODE - At least one lifetime manic episode is required for the dx of Bipolar I Disorder  A. A distinct period of abnormally and persistently elevated, expansive, or irritable mood, lasting at least 1 week (or any duration if hospitalization is necessary).   B. During the period of mood disturbance, three (or more) of the following symptoms (four if the mood is only irritable) have persisted and have been present to a significant degree:   - inflated self-esteem or grandiosity    - decreased need for sleep (e.g., feels rested after only 3 hours of sleep)    - more talkative than usual or pressure to keep talking    - flight of ideas or subjectivie experience that thoughts are racing   - distractibility   - increase in goal-directed activity   - excessive involvement in pleasurable activities that have a high potential for painful consequences, such as spending money or sexual indiscretion.  C. The episode is associated with an unequivocal change in functioning that is uncharateristic of the individual when not symptomatic  D. The disturbance of mood and the change in fuctioning are overservable by others  D. The symptoms are not attributable to the physiologicial effects of a substance or to another medical condition  E. The episode is not sufficiently severe enough to cause marked impairment in social or occupational functioning or to necessitate hospitalization.  If there are psychotic features, the episode is by definition manic  F. The symptoms are not due to the direct physiological effects of a substance (eg, a drug of abuse, a medication, or other treatment) or a general medical condition (eg, hyperthyroidism).  This was first  diagnosed by an MD when she was living in [place] in 2018.  She was experiencing manic behavior at this time.  She has been taking medication that has helped stabilize manic behavior since this time.  Reporting her last episode of hypomania was 2-3 months ago and lasted around 5 days where she was impulsive, not sleeping much, and spending a lot of money and more energetic than usual. Post- Traumatic Stress Disorder  A. The person has been exposed to a traumatic event in which both of the following were present:     (1) the person experienced, witnessed, or was confronted with an event or events that involved actual or threatened death or serious injury, or a threat to the physical integrity of self or others     (2) the person's response involved intense fear, helplessness, or horror. Note: In children, this may be expressed instead by disorganized or agitated behavior  B. The traumatic event is persistently reexperienced in one (or more) of the following ways:     - Recurrent and intrusive distressing recollections of the event, including images, thoughts, or perceptions. Note: In young children, repetitive play may occur in which themes or aspects of the trauma are expressed.      - Recurrent distressing dreams of the event. Note: In children, there may be frightening dreams without recognizable content.      - Acting or feeling as if the traumatic event were recurring (includes a sense of reliving the experience, illusions, hallucinations, and dissociative flashback episodes, including those that occur on awakening or when intoxicated). Note: In young children, trauma-specific reenactment may occur.      - Intense psychological distress at exposure to internal or external cues that symbolize or resemble an aspect of the traumatic event.      - Physiological reactivity on exposure to internal or external cues that symbolize or resemble an aspect of the traumatic event.   C. Persistent avoidance of stimuli  associated with the trauma and numbing of general responsiveness (not present before the trauma), as indicated by three (or more) of the following:     - Efforts to avoid thoughts, feelings, or conversations associated with the trauma.      - Efforts to avoid activities, places, or people that arouse recollections of the trauma.      - Inability to recall an important aspect of the trauma.      - Markedly diminished interest or participation in significant activities.      - Feeling of detachment or estrangement from others.      - Restricted range of affect (e.g., unable to have loving feelings).      - Sense of a foreshortened future (e.g., does not expect to have a career, marriage, children, or a normal life span).   D. Persistent symptoms of increased arousal (not present before the trauma), as indicated by two (or more) of the following:  E. Duration of the disturbance is more than 1 month.  F. The disturbance causes clinically significant distress or impairment in social, occupational, or other important areas of functioning.    Functional Status:  Patient reports the following functional impairments:  management of the household and or completion of tasks, money management, organization, relationship(s), self-care, social interactions and work / vocational responsibilities.     Programmatic care:  Current LOCUS was assigned and patient needs the following level of care based on score patient will be working with outpatient psychotherapy but is also actively trying to be engaged in DBT programming.  .    Clinical Summary:  1. Reason for assessment: referred by one of her providers who believed ongoing psychotherapy would be helpful based on experience of clinical mental health symptoms  .  2. Psychosocial, Cultural and Contextual Factors: Ryanne is an  American female who is working full time, is in a stable and supportive relationship, with no children  .  3. Principal DSM5 Diagnoses  (Sustained by DSM5  Criteria Listed Above):   296.51 Bipolar I Disorder Current or Most Recent Episode Depressed, Mild  300.02 (F41.1) Generalized Anxiety Disorder  309.81 (F43.10) Posttraumatic Stress Disorder (includes Posttraumatic Stress Disorder for Children 6 Years and Younger)  With dissociative symptoms.  4. Other Diagnoses that is relevant to services: Will rule in or out BPD and/or other personality disorders - significant history of self harm and SI/attempts.  Unclear at this time if patient is fitting full criteria and needs further assessment.  5. Provisional Diagnosis: see above.  6. Prognosis: Expect Improvement and Maintain Current Status / Prevent Deterioration.  7. Likely consequences of symptoms if not treated: without ongoing support clinical symptoms may increase in frequency and intensity.  8. Client strengths include:  caring, creative, educated, empathetic, employed, good listener, insightful, intelligent, open to learning and support of family, friends and providers .     Recommendations:     1. Plan for Safety and Risk Management:   Safety and Risk: A safety and risk management plan has been developed including: Patient consented to co-developed safety plan on 7/14/2023 with this provider, can be found in chart, also has past safety plans she has been able to use.  Safety and risk management plan was reviewed.   Patient agreed to use safety plan should any safety concerns arise.  A copy was made available to the patient..          Report to child / adult protection services was NA.     2. Patient's identified mental health concerns with a cultural influence will be addressed by ongoing psychotheray and psychiatry.     3. Initial Treatment will focus on:    Depressed Mood - management of symptoms, coping skills, psychoeducation  Anxiety - management of symptoms, coping skills, psychoeducation  management of symptoms, coping skills, psychoeducation, time and spce to process trauma, emotional regulation  "tools.     4. Resources/Service Plan:    services are not indicated.   Modifications to assist communication are not indicated.   Additional disability accommodations are not indicated.      5. Collaboration:   Collaboration / coordination of treatment will be initiated with the following  support professionals: primary care physician and psychiatry.      6.  Referrals:   The following referral(s) will be initiated: DBT already initiated self-referral and on wait lists. Next Scheduled Appointment: when patient is back in [place] in 2-3 weeks.      A Release of Information has been obtained for the following: NA.     Emergency Contact fiance was obtained.      Clinical Substantiation/medical necessity for the above recommendations:  Symptoms are clinical in nature and are affecting several areas of life including safety.    7. MARILUZ:    MARILUZ:  Discussed the general effects of drugs and alcohol on health and well-being. Provider gave patient printed information about the  effects of chemical use on their health and well being. Recommendations:  NA - patient is not actively using substance, understand effect on mental health.     8. Records:   These were reviewed at time of assessment.   Information in this assessment was obtained from the medical record and  provided by patient who is a good historian.    Patient will have open access to their mental health medical record.    9.   Interactive Complexity: No      Provider Name/ Credentials:  RICKI Parker, [facility]    7/14/2023                    [facility]                                       Ryanne Goff     SAFETY PLAN:  Step 1: Warning signs / cues (Thoughts, images, mood, situation, behavior) that a crisis may be developing:    Thoughts: \"I'm a burden\", \"I can't do this anymore\" and \"I just want this to end\"    Images: flashbacks and visions of harm: self injury    Thinking Processes: racing thoughts, intrusive thoughts " "(bothersome, unwanted thoughts that come out of nowhere): I don't want to do this, I can't do this and highly critical and negative thoughts: You're not capable, you don't deserve this life    Mood: worsening depression, hopelessness, helplessness, intense anger and agitation    Behaviors: can't stop crying, aggression, not taking care of myself, sleeping too much and not sleeping enough    Situations: anniversary of deaths of important people and trauma    Step 2: Coping strategies - Things I can do to take my mind off of my problems without contacting another person (relaxation technique, physical activity):    Distress Tolerance Strategies:  play with my pet , paced breathing/progressive muscle relaxation and watching a sad movie just allowing self to feel sad    Physical Activities: exercise: walking on walking on pad, deep breathing and stretching     Focus on helpful thoughts:  \"It always passes\" and remind myself of what is important to me: thinking of episodes of depression and how I felt at that time, then thinking of in between times and reminding myself I can do that again  Step 3: People and social settings that provide distraction:   Name: Sister Suma Drew Phone: [phone]   Name: Garcia Phone: Number in Phone   Name: David Phone: Number in Phone    We will work on finding places as a goal   Step 4: Remind myself of people and things that are important to me and worth living for:  Sister, parents, friends, partner, pets    Step 5: When I am in crisis, I can ask these people to help me use my safety plan:   Name: Garcia Phone: number in phone  Step 6: Making the environment safe:     remove things I could use to hurt myself: give objects to trusted person to dispose of  Step 7: Professionals or agencies I can contact during a crisis:   Local Crisis Services: The new Crisis line from any phone is 656, you can also text a message to this line.       Professionals or agencies I can contact during a crisis:   " Suicide LifeLine Chat: suicidepreventionlifeline.org/chat  Suicide Prevention Lifeline: just dial 988  Crisis Text Line Service (available 24 hours a day, 7 days a week): Text [place] to 036787      COMMUNITY RESOURCES FOR MENTAL HEALTH EMERGENCIES:    [facility] [phone]   COPE 24/7 [place] Mobile Team [phone] (adults) [phone] (child)  Poison Control Center [phone]    OR  go to nearest ER  [facility] ([facility]) [facility]  [phone]  [employer] [place] on Mental Illness (www.dio.org): 945.389.3761 or 978-963-4336.   Mental Health Association (www.mentalhealth.org): 302.938.1885 or 464-276-6400.  [employer]/resources for a list of additional resources (SOS)   [facility] [phone]   Urgent Care Adult Mental Health-[phone] mobile unit 24/7 crisis line  EMPATH [facility] - Address: [address] [place] [address]; Phone: [phone]    Crisis Services By [facility]: Phone Number:   [place]     [phone]   [place]  [phone]   [facility]  [phone]   [place]    [phone]   [place]/ [place]    [phone]   [place] [phone]   [place]    [phone]   [place]    [phone]   [place] [phone]   [place]     [phone]       Call 911 or go to my nearest emergency department.   I helped develop this safety plan and agree to use it when needed.  I have been given a copy of this plan.      Client signature __________virtual - client will acknowledge via [employer] they have received and agree to plan._______________________________________________________  Today s date:  7/14/2023  Completed by Provider Name/ Credentials:  RICKI Parker, [facility]  July 14, 2023  Adapted from Safety Plan Template 2008 Vicky Paez and Kyle Rivers is reprinted with the express permission of the authors.  No portion of the Safety Plan Template may be reproduced without the express, written permission.  You can  contact the authors at bhs@[email or web address] or [email or web address].          Answers for HPI/ROS submitted by the patient on 7/13/2023  If you checked off any problems, how difficult have these problems made it for you to do your work, take care of things at home, or get along with other people?: Not difficult at all  PHQ9 TOTAL SCORE: 1

## 2023-08-07 ENCOUNTER — VIRTUAL VISIT (OUTPATIENT)
Dept: PSYCHOLOGY | Facility: CLINIC | Age: 38
End: 2023-08-07
Payer: COMMERCIAL

## 2023-08-07 DIAGNOSIS — F31.62 BIPOLAR 1 DISORDER, MIXED, MODERATE (H): ICD-10-CM

## 2023-08-07 DIAGNOSIS — F43.10 PTSD (POST-TRAUMATIC STRESS DISORDER): Primary | ICD-10-CM

## 2023-08-07 DIAGNOSIS — F41.1 GAD (GENERALIZED ANXIETY DISORDER): ICD-10-CM

## 2023-08-07 PROCEDURE — 90834 PSYTX W PT 45 MINUTES: CPT | Mod: VID | Performed by: SOCIAL WORKER

## 2023-08-07 ASSESSMENT — PATIENT HEALTH QUESTIONNAIRE - PHQ9
SUM OF ALL RESPONSES TO PHQ QUESTIONS 1-9: 2
SUM OF ALL RESPONSES TO PHQ QUESTIONS 1-9: 2
10. IF YOU CHECKED OFF ANY PROBLEMS, HOW DIFFICULT HAVE THESE PROBLEMS MADE IT FOR YOU TO DO YOUR WORK, TAKE CARE OF THINGS AT HOME, OR GET ALONG WITH OTHER PEOPLE: NOT DIFFICULT AT ALL

## 2023-08-07 NOTE — PROGRESS NOTES
[facility]                                     Progress Note    Patient Name: Ryanne Goff  Date: 2023         Service Type: Individual      Session Start Time: 200  Session End Time: 245     Session Length: 38-52    Session #: 4    Attendees: Client attended alone    Service Modality:  Video Visit:      Provider verified identity through the following two step process.  Patient provided:  Patient photo and Patient     Telemedicine Visit: The patient's condition can be safely assessed and treated via synchronous audio and visual telemedicine encounter.      Reason for Telemedicine Visit: Patient has requested telehealth visit    Originating Site (Patient Location): Patient's home    Distant Site (Provider Location): Provider Remote Setting- Home Office    Consent:  The patient/guardian has verbally consented to: the potential risks and benefits of telemedicine (video visit) versus in person care; bill my insurance or make self-payment for services provided; and responsibility for payment of non-covered services.     Patient would like the video invitation sent by:  My Chart    Mode of Communication:  Video Conference via Amwell    Distant Location (Provider):  Off-site    As the provider I attest to compliance with applicable laws and regulations related to telemedicine.    DATA  Interactive Complexity: No  Crisis: No        Progress Since Last Session (Related to Symptoms / Goals / Homework):   Symptoms: Improving hasn't felt as down, however not sleeping as well - feels this is due to anxiety of starting new position.    Homework: Achieved / completed to satisfaction      Episode of Care Goals: Satisfactory progress - PREPARATION (Decided to change - considering how); Intervened by negotiating a change plan and determining options / strategies for behavior change, identifying triggers, exploring social supports, and working towards setting a date to begin behavior  change     Current / Ongoing Stressors and Concerns:   Starting a new job and really enjoying it, but feeling some imposter syndrome.     Treatment Objective(s) Addressed in This Session:   Treatment planning  Trauma - coping skills     Intervention:   Treatment planning    Assessments completed prior to visit:  The following assessments were completed by patient for this visit:  PHQ2:       5/5/2022     9:30 AM 5/4/2022     9:06 AM   PHQ-2 ( 1999 [employer])   Q1: Little interest or pleasure in doing things 1 1   Q2: Feeling down, depressed or hopeless 1 1   PHQ-2 Score 2 2   Q1: Little interest or pleasure in doing things  Several days   Q2: Feeling down, depressed or hopeless  Several days   PHQ-2 Score  2     PHQ9:       4/12/2023     4:28 PM 5/8/2023    12:42 PM 6/5/2023     2:10 PM 6/12/2023    10:02 AM 7/6/2023     5:32 PM 7/13/2023    10:00 AM 8/7/2023     8:30 AM   PHQ-9 SCORE   PHQ-9 Total Score MyChart 10 (Moderate depression) 23 (Severe depression) 10 (Moderate depression) 8 (Mild depression) 1 (Minimal depression) 1 (Minimal depression) 2 (Minimal depression)   PHQ-9 Total Score 10 23 10 8 1 1 2     GAD2:       5/4/2023     1:25 PM 6/6/2023     2:26 PM   ANU-2   Feeling nervous, anxious, or on edge 3    3 2   Not being able to stop or control worrying 2    2 2   ANU-2 Total Score 5    5 4     GAD7:       5/5/2022     9:30 AM 2/10/2023     1:52 PM 3/15/2023     4:59 PM 4/12/2023     4:28 PM 5/4/2023     1:25 PM 6/6/2023     2:26 PM   ANU-7 SCORE   Total Score  14 (moderate anxiety) 19 (severe anxiety) 11 (moderate anxiety) 14 (moderate anxiety) 10 (moderate anxiety)   Total Score 9 14 19 11 14    14 10     CAGE-AID:       2/10/2023     2:16 PM 5/9/2023    10:00 AM   CAGE-AID Total Score   Total Score     Total Score MyChart         Information is confidential and restricted. Go to Review Flowsheets to unlock data.     PROMIS 10-Global Health (all questions and answers displayed):       2/10/2023     2:16 PM  5/8/2023    12:43 PM 6/2/2023    11:49 AM 6/6/2023     2:27 PM   PROMIS 10   In general, would you say your health is:    Good   In general, would you say your quality of life is:    Good   In general, how would you rate your physical health?    Good   In general, how would you rate your mental health, including your mood and your ability to think?    Poor   In general, how would you rate your satisfaction with your social activities and relationships?    Fair   In general, please rate how well you carry out your usual social activities and roles    Poor   To what extent are you able to carry out your everyday physical activities such as walking, climbing stairs, carrying groceries, or moving a chair?    Mostly   In the past 7 days, how often have you been bothered by emotional problems such as feeling anxious, depressed, or irritable?    Sometimes   In the past 7 days, how would you rate your fatigue on average?    Mild   In the past 7 days, how would you rate your pain on average, where 0 means no pain, and 10 means worst imaginable pain?    0   In general, would you say your health is:    3   In general, would you say your quality of life is:    3   In general, how would you rate your physical health?    3   In general, how would you rate your mental health, including your mood and your ability to think?    1   In general, how would you rate your satisfaction with your social activities and relationships?    2   In general, please rate how well you carry out your usual social activities and roles. (This includes activities at home, at work and in your community, and responsibilities as a parent, child, spouse, employee, friend, etc.)    1   To what extent are you able to carry out your everyday physical activities such as walking, climbing stairs, carrying groceries, or moving a chair?    4   In the past 7 days, how often have you been bothered by emotional problems such as feeling anxious, depressed, or  irritable?    3   In the past 7 days, how would you rate your fatigue on average?    2   In the past 7 days, how would you rate your pain on average, where 0 means no pain, and 10 means worst imaginable pain?    0   Global Mental Health Score    9   Global Physical Health Score    16   PROMIS TOTAL - SUBSCORES    25       Information is confidential and restricted. Go to Review Flowsheets to unlock data.     PROMIS 10-Global Health (only subscores and total score):       2/10/2023     2:16 PM 5/8/2023    12:43 PM 6/2/2023    11:49 AM 6/6/2023     2:27 PM   PROMIS-10 Scores Only   Global Mental Health Score    9   Global Physical Health Score    16   PROMIS TOTAL - SUBSCORES    25       Information is confidential and restricted. Go to Review Flowsheets to unlock data.     [place] Suicide Severity Rating Scale (Lifetime/Recent)      5/9/2023    10:00 AM   [place] Suicide Severity Rating (Lifetime/Recent)   Q1 Wish to be Dead (Lifetime) Yes   Comments age 11-disapointing mom   Q2 Non-Specific Active Suicidal Thoughts (Lifetime) No   Most Severe Ideation Rating (Lifetime) 2   Most Severe Ideation Description (Lifetime) SA age 19-cutting, drinking and took vicodin plus    Frequency (Lifetime) 1   Duration (Lifetime) 1   Controllability (Lifetime) 2   Protective Factors  (Lifetime) 1   Reasons for Ideation (Lifetime) 0   Q1 Wished to be Dead (Past Month) yes   Q2 Suicidal Thoughts (Past Month) yes   Q3 Suicidal Thought Method yes   Q4 Suicidal Intent without Specific Plan no   Q5 Suicide Intent with Specific Plan no   Q6 Suicide Behavior (Lifetime) yes   Within the Past 3 Months? no   Level of Risk per Screen moderate risk     [place] Suicide Severity Rating Scale (Short Version)      5/9/2023    10:00 AM 5/30/2023    11:00 AM   [place] Suicide Severity Rating (Short Version)   Q1 Wished to be Dead (Past Month) yes    Q2 Suicidal Thoughts (Past Month) yes    Q3 Suicidal Thought Method yes    Q4 Suicidal  "Intent without Specific Plan no    Q5 Suicide Intent with Specific Plan no    Q6 Suicide Behavior (Lifetime) yes    Within the Past 3 Months? no    Level of Risk per Screen moderate risk    1. Wish to be Dead (Since Last Contact)  Y   Wish to be Dead Description (Since Last Contact)  Some days \"I want to be unalive\"   2. Non-Specific Active Suicidal Thoughts (Since Last Contact)  N   Actual Attempt (Since Last Contact)  N   Has subject engaged in non-suicidal self-injurious behavior? (Since Last Contact)  N   Interrupted Attempts (Since Last Contact)  N   Aborted or Self-Interrupted Attempt (Since Last Contact)  N   Preparatory Acts or Behavior (Since Last Contact)  N   Suicide (Since Last Contact)  N   Calculated C-SSRS Risk Score (Since Last Contact)  Low Risk         ASSESSMENT: Current Emotional / Mental Status (status of significant symptoms):   Risk status (Self / Other harm or suicidal ideation)   Patient denies current fears or concerns for personal safety.   Patient reports the following current or recent suicidal ideation or behaviors: past SI - safety plan.   Patient denies current or recent homicidal ideation or behaviors.   Patient denies current or recent self injurious behavior or ideation.   Patient denies other safety concerns.   Patient reports there has been no change in risk factors since their last session.     Patient reports there has been no change in protective factors since their last session.     A safety and risk management plan has been developed including: Patient consented to co-developed safety plan on previous visit.  Safety and risk management plan was reviewed.   Patient agreed to use safety plan should any safety concerns arise.  A copy was made available to the patient.     Appearance:   Appropriate    Eye Contact:   Good    Psychomotor Behavior: Normal    Attitude:   Cooperative  Friendly Pleasant   Orientation:   All   Speech    Rate / Production: Normal/ Responsive Normal "     Volume:  Normal    Mood:    Normal   Affect:    Appropriate    Thought Content:  Clear    Thought Form:  Coherent  Logical    Insight:    Good      Medication Review:   No changes to current psychiatric medication(s)     Medication Compliance:   Yes     Changes in Health Issues:   None reported     Chemical Use Review:   Substance Use: Chemical use reviewed, no active concerns identified      Tobacco Use: No current tobacco use.      Diagnosis:  No diagnosis found.    Collateral Reports Completed:   Not Applicable    PLAN: (Patient Tasks / Therapist Tasks / Other)  Try grounding coping skills        Swapna Houser, [facility]                                                         ______________________________________________________________________    Individual Treatment Plan    Patient's Name: Ryanne Goff  YOB: 1985    Date of Creation: 8/7/2023    Date Treatment Plan Last Reviewed/Revised: 8/7/2023    DSM5 Diagnoses: 296.52 Bipolar I Disorder Current or Most Recent Episode Depressed, Moderate, 300.02 (F41.1) Generalized Anxiety Disorder, or 309.81 (F43.10) Posttraumatic Stress Disorder (includes Posttraumatic Stress Disorder for Children 6 Years and Younger)  With dissociative symptoms  Psychosocial / Contextual Factors: Ryanne is a 37 year old  American woman who is in a stable supportive relationship and working full time.  PROMIS (reviewed every 90 days): PROMIS-10 Scores        5/8/2023    12:43 PM 6/2/2023    11:49 AM 6/6/2023     2:27 PM   PROMIS-10 Total Score w/o Sub Scores   PROMIS TOTAL - SUBSCORES   25       Information is confidential and restricted. Go to Review Flowsheets to unlock data.        Referral / Collaboration:  Referral to another professional/service is not indicated at this time..    Anticipated number of session for this episode of care:  27-36   Anticipation frequency of session: Weekly  Anticipated Duration of each session: 38-52 minutes  Treatment  plan will be reviewed in 90 days or when goals have been changed.       MeasurableTreatment Goal(s) related to diagnosis / functional impairment(s)  Goal 1: Patient will feel like she is better able to manage the highs and lows of depression and amber; and will use safety plan every time suicidal thoughts come up.      I will know I've met my goal when I am not dwelling as much on the past, but more able to self reflect.        Objective #A (Patient Action)  Patient will use safety plan every time suicidal ideation occurs.    Objective #B (Patient Action)    Patient will  work on using positive self talk and use of reframes when needed .  Status: Continued - Date(s):     Intervention(s)  Therapist will teach  CBT skills, affirmations, wise mind, window of tolerance, and use of checking the facts and reframing when needed .    Objective #C  Patient will identify 2 strategies for more effectively managing Bipolar Disorder - related to being able to reach out to supports - even for a positive distraction.  Status: New - Date: 8/7/2023      Intervention(s)  Therapist will assign homework related to reaching out to supports .      Goal 2: Patient will be able to better manage symptoms of trauma as they are triggered     I will know I've met my goal when I am not feeling as controlled by trauma symptoms - shame/guilt, still thinking about the trauma 3+ hours after it is triggered.      Objective #A (Patient Action)    Status: Continued - Date(s):     Patient will  have a better understanding of how trauma affects the brain .    Intervention(s)  Therapist will teach psychoecuation on trauma and how it affects the brain .    Objective #B  Patient will  develop a set of coping skills related to feeling triggered by past trauma .    Status: New - Date: 8/7/2023      Intervention(s)  Therapist will provide educational materials on coping skills related to trauma - BLS , grounding skills .            Patient has reviewed and  "agreed to the above plan.      Swapna CARTER Houser, [facility]  August 7, 2023           [facility]                                        Ryanne Goff      SAFETY PLAN:  Step 1: Warning signs / cues (Thoughts, images, mood, situation, behavior) that a crisis may be developing:  Thoughts: \"I'm a burden\", \"I can't do this anymore\" and \"I just want this to end\"  Images: flashbacks and visions of harm: self injury  Thinking Processes: racing thoughts, intrusive thoughts (bothersome, unwanted thoughts that come out of nowhere): I don't want to do this, I can't do this and highly critical and negative thoughts: You're not capable, you don't deserve this life  Mood: worsening depression, hopelessness, helplessness, intense anger and agitation  Behaviors: can't stop crying, aggression, not taking care of myself, sleeping too much and not sleeping enough  Situations: anniversary of deaths of important people and trauma    Step 2: Coping strategies - Things I can do to take my mind off of my problems without contacting another person (relaxation technique, physical activity):  Distress Tolerance Strategies:  play with my pet , paced breathing/progressive muscle relaxation and watching a sad movie just allowing self to feel sad  Physical Activities: exercise: walking on walking on pad, deep breathing and stretching   Focus on helpful thoughts:  \"It always passes\" and remind myself of what is important to me: thinking of episodes of depression and how I felt at that time, then thinking of in between times and reminding myself I can do that again  Step 3: People and social settings that provide distraction:              Name: Sister Suma Drew    Phone: [phone]              Name: Garcia  Phone: Number in Phone              Name: David      Phone: Number in Phone  We will work on finding places as a goal    Step 4: Remind myself of people and things that are important to me and worth living for:  Sister, " parents, friends, partner, pets     Step 5: When I am in crisis, I can ask these people to help me use my safety plan:              Name: Garcia  Phone: number in phone  Step 6: Making the environment safe:   remove things I could use to hurt myself: give objects to trusted person to dispose of  Step 7: Professionals or agencies I can contact during a crisis:   Local Crisis Services: The new Crisis line from any phone is 988, you can also text a message to this line.       Professionals or agencies I can contact during a crisis:   Suicide LifeLine Chat: suicidepreventionlifeline.org/chat  Suicide Prevention Lifeline: just dial 988  Crisis Text Line Service (available 24 hours a day, 7 days a week): Text [place] to 731248        COMMUNITY RESOURCES FOR MENTAL HEALTH EMERGENCIES:    [facility] [phone]   COPE 24/7 [place] Mobile Team [phone] (adults) [phone] (child)  Poison Control Center [phone]    OR  go to nearest ER  [facility] ([facility]) [facility]  [phone]  National Todd on Mental Illness (www.dio.org): 454.199.6358 or 039-781-8343.   Mental Health Association (www.mentalhealth.org): 195.728.5468 or 154-956-4189.  CUPR/resources for a list of additional resources (SOS)   [facility] [phone]   Urgent Care Adult Mental Health-[phone] mobile unit 24/7 crisis line  [facility] - Address: [facility] [place] [address]; Phone: [phone]     Crisis Services By County: Phone Number:   [place]     [phone]   [place]  [phone]   [facility]  [phone]   [place]    [phone]   [place]/ [place]    [phone]   [place] [phone]   [place]    [phone]   [place]    [phone]   [place] [phone]   [place]     [phone]         Call 911 or go to my nearest emergency department.       I helped develop this safety plan and agree to use it when needed.  I have been given a copy of  this plan.       Client signature __________virtual - client will acknowledge via Max[employer] they have received and agree to plan._______________________________________________________  Today s date:  7/14/2023  Completed by Provider Name/ Credentials:  Swapna Houser, RICKI, [facility]                        July 14, 2023  Adapted from Safety Plan Template 2008 Vicky Paez and Kyle Rivers is reprinted with the express permission of the authors.  No portion of the Safety Plan Template may be reproduced without the express, written permission.  You can contact the authors at [email or web address] or [email or web address].              Answers for HPI/ROS submitted by the patient on 7/13/2023  If you checked off any problems, how difficult have these problems made it for you to do your work, take care of things at home, or get along with other people?: Not difficult at all  PHQ9 TOTAL SCORE: 1    Answers submitted by the patient for this visit:  Patient Health Questionnaire (Submitted on 8/7/2023)  If you checked off any problems, how difficult have these problems made it for you to do your work, take care of things at home, or get along with other people?: Not difficult at all  PHQ9 TOTAL SCORE: 2

## 2023-08-11 DIAGNOSIS — F41.1 GAD (GENERALIZED ANXIETY DISORDER): ICD-10-CM

## 2023-08-11 DIAGNOSIS — F31.62 BIPOLAR 1 DISORDER, MIXED, MODERATE (H): ICD-10-CM

## 2023-08-11 RX ORDER — LAMOTRIGINE 150 MG/1
150 TABLET ORAL DAILY
Qty: 30 TABLET | Refills: 0 | Status: SHIPPED | OUTPATIENT
Start: 2023-08-11

## 2023-08-11 NOTE — TELEPHONE ENCOUNTER
Date of Last Office Visit: 07/07/2023  Date of Next Office Visit: 09/05/2023  No shows since last visit: 0  Cancellations since last visit: 0    Medication requested: lamoTRIgine (LAMICTAL) 150 MG tablet  Date last ordered: 06/13/2023 Qty: 30 Refills: 1     Review of [place] ?: n/a  Lapse in medication adherence greater than 5 days?: no  Medication refill request verified as identical to current order?: yes  Result of Last DAM, VPA, Li+ Level, CBC, or Carbamazepine Level (at or since last visit): N/A    Last visit treatment plan:   Return in about 6 weeks (around 8/18/2023) for Follow up, with me, using a [employer] eVisit.   Take Latuda 20 mg at dinner time for mood, anxiety and depression  Continue Klonopin 0.5 mg daily as needed for anxiety and panic attacks (15 tabs/month)   Continue Lamictal 150 mg for mood, anxiety and depression  Continue Buspar 15 mg twice daily for anxiety  Take  Seroquel 50 mg at bedtime as needed for mood anxiety and sleep  Continue Hydroxyzine 25 mg three times daily as needed for anxiety    []Medication refilled per  Medication Refill in Ambulatory Care  policy.  []Medication unable to be refilled by RN due to criteria not met as indicated below:    []Eligibility - not seen in the last year   []Supervision - no future appointment   []Compliance - no shows, cancellations or lapse in therapy   []Verification - order discrepancy   []Controlled medication   [x]Medication not included in policy   []90-day supply request   [x]Other

## 2023-08-24 ENCOUNTER — MYC MEDICAL ADVICE (OUTPATIENT)
Dept: PSYCHIATRY | Facility: CLINIC | Age: 38
End: 2023-08-24
Payer: COMMERCIAL

## 2023-08-24 NOTE — TELEPHONE ENCOUNTER
Patient reports that her insurance has changed. [facility] is no longer in network. She would like to see a new panel psychiatrist. RN Provided the names of 3 other panel psychiatric providers within [facility]. Patient will call [facility] to schedule after she checks to see if they are in network. RN provided phone number to call [phone].

## 2023-08-24 NOTE — TELEPHONE ENCOUNTER
Patient reports she had an insurance change and [facility] is no longer in her network. Patient would like assistance with transferring to someone who is. Call patient at: [phone].

## 2023-08-31 ENCOUNTER — VIRTUAL VISIT (OUTPATIENT)
Dept: PSYCHOLOGY | Facility: CLINIC | Age: 38
End: 2023-08-31
Payer: COMMERCIAL

## 2023-08-31 DIAGNOSIS — F31.62 BIPOLAR 1 DISORDER, MIXED, MODERATE (H): ICD-10-CM

## 2023-08-31 DIAGNOSIS — F43.10 PTSD (POST-TRAUMATIC STRESS DISORDER): Primary | ICD-10-CM

## 2023-08-31 DIAGNOSIS — F41.1 GAD (GENERALIZED ANXIETY DISORDER): ICD-10-CM

## 2023-08-31 PROCEDURE — 90834 PSYTX W PT 45 MINUTES: CPT | Mod: VID | Performed by: SOCIAL WORKER

## 2023-08-31 NOTE — PROGRESS NOTES
[facility]                                     Progress Note    Patient Name: Ryanne Goff  Date: 2023         Service Type: Individual      Session Start Time: 1100  Session End Time: 1145     Session Length: 38-52    Session #: 5    Attendees: Client attended alone    Service Modality:  Video Visit:      Provider verified identity through the following two step process.  Patient provided:  Patient photo and Patient     Telemedicine Visit: The patient's condition can be safely assessed and treated via synchronous audio and visual telemedicine encounter.      Reason for Telemedicine Visit: Patient has requested telehealth visit    Originating Site (Patient Location): Patient's home    Distant Site (Provider Location): Provider Remote Setting- Home Office    Consent:  The patient/guardian has verbally consented to: the potential risks and benefits of telemedicine (video visit) versus in person care; bill my insurance or make self-payment for services provided; and responsibility for payment of non-covered services.     Patient would like the video invitation sent by:  My Chart    Mode of Communication:  Video Conference via Amwell    Distant Location (Provider):  Off-site    As the provider I attest to compliance with applicable laws and regulations related to telemedicine.    DATA  Interactive Complexity: No  Crisis: No        Progress Since Last Session (Related to Symptoms / Goals / Homework):   Symptoms: Improving hasn't felt as down, however not sleeping as well - feels this is due to anxiety of starting new position.     Homework: Achieved / completed to satisfaction      Episode of Care Goals: Satisfactory progress - PREPARATION (Decided to change - considering how); Intervened by negotiating a change plan and determining options / strategies for behavior change, identifying triggers, exploring social supports, and working towards setting a date to begin behavior  change     Current / Ongoing Stressors and Concerns:   Starting a new job and really enjoying it, but feeling some imposter syndrome.     Treatment Objective(s) Addressed in This Session:   Treatment planning  Trauma - coping skills     Intervention:   Treatment planning     Psychoeducation on the nervous system and panic.  Gave suggestion of additional coping skills to try (BLS play list and how to incorporate this into tool box of coping skills.  Provided positive feedback, validation and empathic listening.      Assessments completed prior to visit:  The following assessments were completed by patient for this visit:  PHQ2:       5/5/2022     9:30 AM 5/4/2022     9:06 AM   PHQ-2 ( 1999 [employer])   Q1: Little interest or pleasure in doing things 1 1   Q2: Feeling down, depressed or hopeless 1 1   PHQ-2 Score 2 2   Q1: Little interest or pleasure in doing things  Several days   Q2: Feeling down, depressed or hopeless  Several days   PHQ-2 Score  2     PHQ9:       4/12/2023     4:28 PM 5/8/2023    12:42 PM 6/5/2023     2:10 PM 6/12/2023    10:02 AM 7/6/2023     5:32 PM 7/13/2023    10:00 AM 8/7/2023     8:30 AM   PHQ-9 SCORE   PHQ-9 Total Score MyChart 10 (Moderate depression) 23 (Severe depression) 10 (Moderate depression) 8 (Mild depression) 1 (Minimal depression) 1 (Minimal depression) 2 (Minimal depression)   PHQ-9 Total Score 10 23 10 8 1 1 2     GAD2:       5/4/2023     1:25 PM 6/6/2023     2:26 PM 8/30/2023    12:31 PM   ANU-2   Feeling nervous, anxious, or on edge 3    3 2 1   Not being able to stop or control worrying 2    2 2 0   ANU-2 Total Score 5    5 4 1     GAD7:       5/5/2022     9:30 AM 2/10/2023     1:52 PM 3/15/2023     4:59 PM 4/12/2023     4:28 PM 5/4/2023     1:25 PM 6/6/2023     2:26 PM   ANU-7 SCORE   Total Score  14 (moderate anxiety) 19 (severe anxiety) 11 (moderate anxiety) 14 (moderate anxiety) 10 (moderate anxiety)   Total Score 9 14 19 11 14    14 10     CAGE-AID:       2/10/2023      2:16 PM 5/9/2023    10:00 AM   CAGE-AID Total Score   Total Score     Total Score Alcides         Information is confidential and restricted. Go to Review Flowsheets to unlock data.     PROMIS 10-Global Health (all questions and answers displayed):       2/10/2023     2:16 PM 5/8/2023    12:43 PM 6/2/2023    11:49 AM 6/6/2023     2:27 PM   PROMIS 10   In general, would you say your health is:    Good   In general, would you say your quality of life is:    Good   In general, how would you rate your physical health?    Good   In general, how would you rate your mental health, including your mood and your ability to think?    Poor   In general, how would you rate your satisfaction with your social activities and relationships?    Fair   In general, please rate how well you carry out your usual social activities and roles    Poor   To what extent are you able to carry out your everyday physical activities such as walking, climbing stairs, carrying groceries, or moving a chair?    Mostly   In the past 7 days, how often have you been bothered by emotional problems such as feeling anxious, depressed, or irritable?    Sometimes   In the past 7 days, how would you rate your fatigue on average?    Mild   In the past 7 days, how would you rate your pain on average, where 0 means no pain, and 10 means worst imaginable pain?    0   In general, would you say your health is:    3   In general, would you say your quality of life is:    3   In general, how would you rate your physical health?    3   In general, how would you rate your mental health, including your mood and your ability to think?    1   In general, how would you rate your satisfaction with your social activities and relationships?    2   In general, please rate how well you carry out your usual social activities and roles. (This includes activities at home, at work and in your community, and responsibilities as a parent, child, spouse, employee, friend, etc.)    1    To what extent are you able to carry out your everyday physical activities such as walking, climbing stairs, carrying groceries, or moving a chair?    4   In the past 7 days, how often have you been bothered by emotional problems such as feeling anxious, depressed, or irritable?    3   In the past 7 days, how would you rate your fatigue on average?    2   In the past 7 days, how would you rate your pain on average, where 0 means no pain, and 10 means worst imaginable pain?    0   Global Mental Health Score    9   Global Physical Health Score    16   PROMIS TOTAL - SUBSCORES    25       Information is confidential and restricted. Go to Review Flowsheets to unlock data.     PROMIS 10-Global Health (only subscores and total score):       2/10/2023     2:16 PM 5/8/2023    12:43 PM 6/2/2023    11:49 AM 6/6/2023     2:27 PM   PROMIS-10 Scores Only   Global Mental Health Score    9   Global Physical Health Score    16   PROMIS TOTAL - SUBSCORES    25       Information is confidential and restricted. Go to Review Flowsheets to unlock data.     [place] Suicide Severity Rating Scale (Lifetime/Recent)      5/9/2023    10:00 AM   [place] Suicide Severity Rating (Lifetime/Recent)   Q1 Wish to be Dead (Lifetime) Yes   Comments age 11-disapointing mom   Q2 Non-Specific Active Suicidal Thoughts (Lifetime) No   Most Severe Ideation Rating (Lifetime) 2   Most Severe Ideation Description (Lifetime) SA age 19-cutting, drinking and took vicodin plus    Frequency (Lifetime) 1   Duration (Lifetime) 1   Controllability (Lifetime) 2   Protective Factors  (Lifetime) 1   Reasons for Ideation (Lifetime) 0   Q1 Wished to be Dead (Past Month) yes   Q2 Suicidal Thoughts (Past Month) yes   Q3 Suicidal Thought Method yes   Q4 Suicidal Intent without Specific Plan no   Q5 Suicide Intent with Specific Plan no   Q6 Suicide Behavior (Lifetime) yes   Within the Past 3 Months? no   Level of Risk per Screen moderate risk     [place]  "Suicide Severity Rating Scale (Short Version)      5/9/2023    10:00 AM 5/30/2023    11:00 AM   [place] Suicide Severity Rating (Short Version)   Q1 Wished to be Dead (Past Month) yes    Q2 Suicidal Thoughts (Past Month) yes    Q3 Suicidal Thought Method yes    Q4 Suicidal Intent without Specific Plan no    Q5 Suicide Intent with Specific Plan no    Q6 Suicide Behavior (Lifetime) yes    Within the Past 3 Months? no    Level of Risk per Screen moderate risk    1. Wish to be Dead (Since Last Contact)  Y   Wish to be Dead Description (Since Last Contact)  Some days \"I want to be unalive\"   2. Non-Specific Active Suicidal Thoughts (Since Last Contact)  N   Actual Attempt (Since Last Contact)  N   Has subject engaged in non-suicidal self-injurious behavior? (Since Last Contact)  N   Interrupted Attempts (Since Last Contact)  N   Aborted or Self-Interrupted Attempt (Since Last Contact)  N   Preparatory Acts or Behavior (Since Last Contact)  N   Suicide (Since Last Contact)  N   Calculated C-SSRS Risk Score (Since Last Contact)  Low Risk         ASSESSMENT: Current Emotional / Mental Status (status of significant symptoms):   Risk status (Self / Other harm or suicidal ideation)   Patient denies current fears or concerns for personal safety.   Patient reports the following current or recent suicidal ideation or behaviors: past SI - safety plan.   Patient denies current or recent homicidal ideation or behaviors.   Patient denies current or recent self injurious behavior or ideation.   Patient denies other safety concerns.   Patient reports there has been no change in risk factors since their last session.     Patient reports there has been no change in protective factors since their last session.     A safety and risk management plan has been developed including: Patient consented to co-developed safety plan on previous visit.  Safety and risk management plan was reviewed.   Patient agreed to use safety plan should any " safety concerns arise.  A copy was made available to the patient.     Appearance:   Appropriate    Eye Contact:   Good    Psychomotor Behavior: Normal    Attitude:   Cooperative  Friendly Pleasant   Orientation:   All   Speech    Rate / Production: Normal/ Responsive Normal     Volume:  Normal    Mood:    Normal   Affect:    Appropriate    Thought Content:  Clear    Thought Form:  Coherent  Logical    Insight:    Good      Medication Review:  No changes to current psychiatric medication(s) - however seeing a new psychiatry provider in three weeks due to change in insurance - Dr. Mabel Amos.     Medication Compliance:   Yes     Changes in Health Issues:   None reported     Chemical Use Review:   Substance Use: Chemical use reviewed, no active concerns identified      Tobacco Use: No current tobacco use.      Diagnosis:  No diagnosis found.    Collateral Reports Completed:   Not Applicable    PLAN: (Patient Tasks / Therapist Tasks / Other)  Try grounding coping skills + add in BLS playlist    Check in in 2-3 weeks.        Swapna Houser, [facility]                                                         ______________________________________________________________________    Individual Treatment Plan    Patient's Name: Ryanne Goff  YOB: 1985    Date of Creation: 8/7/2023    Date Treatment Plan Last Reviewed/Revised: 8/7/2023    DSM5 Diagnoses: 296.52 Bipolar I Disorder Current or Most Recent Episode Depressed, Moderate, 300.02 (F41.1) Generalized Anxiety Disorder, or 309.81 (F43.10) Posttraumatic Stress Disorder (includes Posttraumatic Stress Disorder for Children 6 Years and Younger)  With dissociative symptoms  Psychosocial / Contextual Factors: Ryanne is a 37 year old  American woman who is in a stable supportive relationship and working full time.  PROMIS (reviewed every 90 days): PROMIS-10 Scores        5/8/2023    12:43 PM 6/2/2023    11:49 AM 6/6/2023     2:27 PM   PROMIS-10  Total Score w/o Sub Scores   PROMIS TOTAL - SUBSCORES   25       Information is confidential and restricted. Go to Review Flowsheets to unlock data.        Referral / Collaboration:  Referral to another professional/service is not indicated at this time..    Anticipated number of session for this episode of care:  27-36   Anticipation frequency of session: Weekly  Anticipated Duration of each session: 38-52 minutes  Treatment plan will be reviewed in 90 days or when goals have been changed.       MeasurableTreatment Goal(s) related to diagnosis / functional impairment(s)  Goal 1: Patient will feel like she is better able to manage the highs and lows of depression and amber; and will use safety plan every time suicidal thoughts come up.      I will know I've met my goal when I am not dwelling as much on the past, but more able to self reflect.        Objective #A (Patient Action)  Patient will use safety plan every time suicidal ideation occurs.    Objective #B (Patient Action)    Patient will  work on using positive self talk and use of reframes when needed .  Status: Continued - Date(s):     Intervention(s)  Therapist will teach  CBT skills, affirmations, wise mind, window of tolerance, and use of checking the facts and reframing when needed .    Objective #C  Patient will identify 2 strategies for more effectively managing Bipolar Disorder - related to being able to reach out to supports - even for a positive distraction.  Status: New - Date: 8/7/2023      Intervention(s)  Therapist will assign homework related to reaching out to supports .      Goal 2: Patient will be able to better manage symptoms of trauma as they are triggered     I will know I've met my goal when I am not feeling as controlled by trauma symptoms - shame/guilt, still thinking about the trauma 3+ hours after it is triggered.      Objective #A (Patient Action)    Status: Continued - Date(s):     Patient will  have a better understanding of how  "trauma affects the brain .    Intervention(s)  Therapist will teach psychoecuation on trauma and how it affects the brain .    Objective #B  Patient will  develop a set of coping skills related to feeling triggered by past trauma .    Status: New - Date: 8/7/2023      Intervention(s)  Therapist will provide educational materials on coping skills related to trauma - BLS , grounding skills .            Patient has reviewed and agreed to the above plan.      Swapna Houser, [facility]  August 7, 2023           [facility]                                        Ryanne Goff      SAFETY PLAN:  Step 1: Warning signs / cues (Thoughts, images, mood, situation, behavior) that a crisis may be developing:  Thoughts: \"I'm a burden\", \"I can't do this anymore\" and \"I just want this to end\"  Images: flashbacks and visions of harm: self injury  Thinking Processes: racing thoughts, intrusive thoughts (bothersome, unwanted thoughts that come out of nowhere): I don't want to do this, I can't do this and highly critical and negative thoughts: You're not capable, you don't deserve this life  Mood: worsening depression, hopelessness, helplessness, intense anger and agitation  Behaviors: can't stop crying, aggression, not taking care of myself, sleeping too much and not sleeping enough  Situations: anniversary of deaths of important people and trauma    Step 2: Coping strategies - Things I can do to take my mind off of my problems without contacting another person (relaxation technique, physical activity):  Distress Tolerance Strategies:  play with my pet , paced breathing/progressive muscle relaxation and watching a sad movie just allowing self to feel sad  Physical Activities: exercise: walking on walking on pad, deep breathing and stretching   Focus on helpful thoughts:  \"It always passes\" and remind myself of what is important to me: thinking of episodes of depression and how I felt at that time, then " thinking of in between times and reminding myself I can do that again  Step 3: People and social settings that provide distraction:              Name: Sister Suma Drew    Phone: [phone]              Name: Garcia  Phone: Number in Phone              Name: David      Phone: Number in Phone  We will work on finding places as a goal    Step 4: Remind myself of people and things that are important to me and worth living for:  Sister, parents, friends, partner, pets     Step 5: When I am in crisis, I can ask these people to help me use my safety plan:              Name: Garcia  Phone: number in phone  Step 6: Making the environment safe:   remove things I could use to hurt myself: give objects to trusted person to dispose of  Step 7: Professionals or agencies I can contact during a crisis:   Local Crisis Services: The new Crisis line from any phone is 988, you can also text a message to this line.       Professionals or agencies I can contact during a crisis:   Suicide LifeLine Chat: suicidepreventionlifeline.org/chat  Suicide Prevention Lifeline: just dial 988  Crisis Text Line Service (available 24 hours a day, 7 days a week): Text [place] to 195886        COMMUNITY RESOURCES FOR MENTAL HEALTH EMERGENCIES:    [facility] [phone]   COPE 24/7 [place] Mobile Team [phone] (adults) [phone] (child)  Poison Control Center [phone]    OR  go to nearest ER  [facility] ([facility]) [facility]  [phone]  National [place] on Mental Illness (www.dio.org): 324.214.4195 or 913-853-7708.   Mental Health Association (www.mentalhealth.org): 816.900.5828 or 518-373-8434.  [employer].Playrcart/resources for a list of additional resources (SOS)   [facility] [phone]   Urgent Care Adult Mental Health-[phone] mobile unit 24/7 crisis line  EMPATH UNIT [facility] - Address: [facility] [place] [address]; Phone: [phone]     Crisis Services By County:  Phone Number:   [place]     [phone]   [place]  [phone]   [place]  [phone]   [place]    [phone]   [place]/ [place]    [phone]   [place] [phone]   [place]    [phone]   [place]    [phone]   [place] [phone]   [place]     [phone]         Call 911 or go to my nearest emergency department.       I helped develop this safety plan and agree to use it when needed.  I have been given a copy of this plan.       Client signature __________virtual - client will acknowledge via Dapt they have received and agree to plan._______________________________________________________  Today s date:  7/14/2023  Completed by Provider Name/ Credentials:  RICKI Parker, [facility]                        July 14, 2023  Adapted from Safety Plan Template 2008 Vicky Paez and Kyle Rivers is reprinted with the express permission of the authors.  No portion of the Safety Plan Template may be reproduced without the express, written permission.  You can contact the authors at [email or web address] or [email or web address].              Answers for HPI/ROS submitted by the patient on 7/13/2023  If you checked off any problems, how difficult have these problems made it for you to do your work, take care of things at home, or get along with other people?: Not difficult at all  PHQ9 TOTAL SCORE: 1    Answers submitted by the patient for this visit:  Patient Health Questionnaire (Submitted on 8/7/2023)  If you checked off any problems, how difficult have these problems made it for you to do your work, take care of things at home, or get along with other people?: Not difficult at all  PHQ9 TOTAL SCORE: 2

## 2023-09-18 ENCOUNTER — VIRTUAL VISIT (OUTPATIENT)
Dept: PSYCHOLOGY | Facility: CLINIC | Age: 38
End: 2023-09-18
Payer: COMMERCIAL

## 2023-09-18 DIAGNOSIS — F41.1 GAD (GENERALIZED ANXIETY DISORDER): ICD-10-CM

## 2023-09-18 DIAGNOSIS — F31.62 BIPOLAR 1 DISORDER, MIXED, MODERATE (H): ICD-10-CM

## 2023-09-18 DIAGNOSIS — F43.10 PTSD (POST-TRAUMATIC STRESS DISORDER): Primary | ICD-10-CM

## 2023-09-18 PROCEDURE — 90834 PSYTX W PT 45 MINUTES: CPT | Mod: VID | Performed by: SOCIAL WORKER

## 2023-09-18 NOTE — PROGRESS NOTES
[facility]                                     Progress Note    Patient Name: Ryanne Goff  Date: 2023         Service Type: Individual      Session Start Time: 100  Session End Time: 140     Session Length: 38-52    Session #: 6    Attendees: Client attended alone    Service Modality:  Video Visit:      Provider verified identity through the following two step process.  Patient provided:  Patient photo and Patient     Telemedicine Visit: The patient's condition can be safely assessed and treated via synchronous audio and visual telemedicine encounter.      Reason for Telemedicine Visit: Patient has requested telehealth visit    Originating Site (Patient Location): Patient's home    Distant Site (Provider Location): Provider Remote Setting- Home Office    Consent:  The patient/guardian has verbally consented to: the potential risks and benefits of telemedicine (video visit) versus in person care; bill my insurance or make self-payment for services provided; and responsibility for payment of non-covered services.     Patient would like the video invitation sent by:  My Chart    Mode of Communication:  Video Conference via [facility]well    Distant Location (Provider):  Off-site    As the provider I attest to compliance with applicable laws and regulations related to telemedicine.    DATA  Interactive Complexity: No  Crisis: No        Progress Since Last Session (Related to Symptoms / Goals / Homework):   Symptoms: Partner is going to be accepting a position and moving to [place] which has stirred up a lot of emotions but Ryanne is coping/managing well and allowing herself to both be sad and excited.  Improving hasn't felt as down, however not sleeping as well - feels this is due to anxiety of starting new position.     Homework: Achieved / completed to satisfaction      Episode of Care Goals: Satisfactory progress - PREPARATION (Decided to change - considering how); Intervened by  negotiating a change plan and determining options / strategies for behavior change, identifying triggers, exploring social supports, and working towards setting a date to begin behavior change     Current / Ongoing Stressors and Concerns:   Starting a new job and really enjoying it, but feeling some imposter syndrome.     Treatment Objective(s) Addressed in This Session:   Treatment planning  Trauma - coping skills     Intervention:   Treatment planning     Psychoeducation on the nervous system and panic.  Gave suggestion of additional coping skills to try BLS play list and how to incorporate this into tool box of coping skills.  Provided positive feedback, validation and empathic listening.       Allowed a safe space for Ryanne to discuss and navigate upcoming life changes.  Encouraged use of identifying emotions and describing them.  Praised Ryanne for her ability to focus on what she can do instead of not do in hard situations and reviewed ways in which she has been able to check the facts and stick with reality.  Explored options for transitioning therapists during the move and discussed upcoming ZARINA, also discussed options of staying with this provider.    Assessments completed prior to visit:  The following assessments were completed by patient for this visit:  PHQ2:       5/5/2022     9:30 AM 5/4/2022     9:06 AM   PHQ-2 ( 1999 [employer])   Q1: Little interest or pleasure in doing things 1 1   Q2: Feeling down, depressed or hopeless 1 1   PHQ-2 Score 2 2   Q1: Little interest or pleasure in doing things  Several days   Q2: Feeling down, depressed or hopeless  Several days   PHQ-2 Score  2     PHQ9:       5/8/2023    12:42 PM 6/5/2023     2:10 PM 6/12/2023    10:02 AM 7/6/2023     5:32 PM 7/13/2023    10:00 AM 8/7/2023     8:30 AM 9/18/2023     9:37 AM   PHQ-9 SCORE   PHQ-9 Total Score MyChart 23 (Severe depression) 10 (Moderate depression) 8 (Mild depression) 1 (Minimal depression) 1 (Minimal depression) 2 (Minimal  depression) 1 (Minimal depression)   PHQ-9 Total Score 23 10 8 1 1 2 1     GAD2:       5/4/2023     1:25 PM 6/6/2023     2:26 PM 8/30/2023    12:31 PM   ANU-2   Feeling nervous, anxious, or on edge 3    3 2 1   Not being able to stop or control worrying 2    2 2 0   ANU-2 Total Score 5    5 4 1     GAD7:       5/5/2022     9:30 AM 2/10/2023     1:52 PM 3/15/2023     4:59 PM 4/12/2023     4:28 PM 5/4/2023     1:25 PM 6/6/2023     2:26 PM   ANU-7 SCORE   Total Score  14 (moderate anxiety) 19 (severe anxiety) 11 (moderate anxiety) 14 (moderate anxiety) 10 (moderate anxiety)   Total Score 9 14 19 11 14    14 10     CAGE-AID:       2/10/2023     2:16 PM 5/9/2023    10:00 AM   CAGE-AID Total Score   Total Score     Total Score MyChart         Information is confidential and restricted. Go to Review Flowsheets to unlock data.     PROMIS 10-Global Health (all questions and answers displayed):       2/10/2023     2:16 PM 5/8/2023    12:43 PM 6/2/2023    11:49 AM 6/6/2023     2:27 PM 9/18/2023     9:37 AM   PROMIS 10   In general, would you say your health is:    Good Good   In general, would you say your quality of life is:    Good Very good   In general, how would you rate your physical health?    Good Good   In general, how would you rate your mental health, including your mood and your ability to think?    Poor Good   In general, how would you rate your satisfaction with your social activities and relationships?    Fair Very good   In general, please rate how well you carry out your usual social activities and roles    Poor Good   To what extent are you able to carry out your everyday physical activities such as walking, climbing stairs, carrying groceries, or moving a chair?    Mostly Completely   In the past 7 days, how often have you been bothered by emotional problems such as feeling anxious, depressed, or irritable?    Sometimes Rarely   In the past 7 days, how would you rate your fatigue on average?    Mild None    In the past 7 days, how would you rate your pain on average, where 0 means no pain, and 10 means worst imaginable pain?    0 0   In general, would you say your health is:    3 3    3   In general, would you say your quality of life is:    3 4    4   In general, how would you rate your physical health?    3 3    3   In general, how would you rate your mental health, including your mood and your ability to think?    1 3    3   In general, how would you rate your satisfaction with your social activities and relationships?    2 4    4   In general, please rate how well you carry out your usual social activities and roles. (This includes activities at home, at work and in your community, and responsibilities as a parent, child, spouse, employee, friend, etc.)    1 3    3   To what extent are you able to carry out your everyday physical activities such as walking, climbing stairs, carrying groceries, or moving a chair?    4 5    5   In the past 7 days, how often have you been bothered by emotional problems such as feeling anxious, depressed, or irritable?    3 2    2   In the past 7 days, how would you rate your fatigue on average?    2 1    1   In the past 7 days, how would you rate your pain on average, where 0 means no pain, and 10 means worst imaginable pain?    0 0    0   Global Mental Health Score    9 15    15   Global Physical Health Score    16 18    18   PROMIS TOTAL - SUBSCORES    25 33    33       Information is confidential and restricted. Go to Review Flowsheets to unlock data.    Multiple values from one day are sorted in reverse-chronological order     PROMIS 10-Global Health (only subscores and total score):       2/10/2023     2:16 PM 5/8/2023    12:43 PM 6/2/2023    11:49 AM 6/6/2023     2:27 PM 9/18/2023     9:37 AM   PROMIS-10 Scores Only   Global Mental Health Score    9 15    15   Global Physical Health Score    16 18    18   PROMIS TOTAL - SUBSCORES    25 33    33       Information is  "confidential and restricted. Go to Review Flowsheets to unlock data.    Multiple values from one day are sorted in reverse-chronological order     [place] Suicide Severity Rating Scale (Lifetime/Recent)      5/9/2023    10:00 AM   [place] Suicide Severity Rating (Lifetime/Recent)   Q1 Wish to be Dead (Lifetime) Yes   Comments age 11-disapointing mom   Q2 Non-Specific Active Suicidal Thoughts (Lifetime) No   Most Severe Ideation Rating (Lifetime) 2   Most Severe Ideation Description (Lifetime) SA age 19-cutting, drinking and took vicodin plus    Frequency (Lifetime) 1   Duration (Lifetime) 1   Controllability (Lifetime) 2   Protective Factors  (Lifetime) 1   Reasons for Ideation (Lifetime) 0   Q1 Wished to be Dead (Past Month) yes   Q2 Suicidal Thoughts (Past Month) yes   Q3 Suicidal Thought Method yes   Q4 Suicidal Intent without Specific Plan no   Q5 Suicide Intent with Specific Plan no   Q6 Suicide Behavior (Lifetime) yes   Within the Past 3 Months? no   Level of Risk per Screen moderate risk     [place] Suicide Severity Rating Scale (Short Version)      5/9/2023    10:00 AM 5/30/2023    11:00 AM   [place] Suicide Severity Rating (Short Version)   Q1 Wished to be Dead (Past Month) yes    Q2 Suicidal Thoughts (Past Month) yes    Q3 Suicidal Thought Method yes    Q4 Suicidal Intent without Specific Plan no    Q5 Suicide Intent with Specific Plan no    Q6 Suicide Behavior (Lifetime) yes    Within the Past 3 Months? no    Level of Risk per Screen moderate risk    1. Wish to be Dead (Since Last Contact)  Y   Wish to be Dead Description (Since Last Contact)  Some days \"I want to be unalive\"   2. Non-Specific Active Suicidal Thoughts (Since Last Contact)  N   Actual Attempt (Since Last Contact)  N   Has subject engaged in non-suicidal self-injurious behavior? (Since Last Contact)  N   Interrupted Attempts (Since Last Contact)  N   Aborted or Self-Interrupted Attempt (Since Last Contact)  N   Preparatory " Acts or Behavior (Since Last Contact)  N   Suicide (Since Last Contact)  N   Calculated C-SSRS Risk Score (Since Last Contact)  Low Risk         ASSESSMENT: Current Emotional / Mental Status (status of significant symptoms):   Risk status (Self / Other harm or suicidal ideation)   Patient denies current fears or concerns for personal safety.   Patient reports the following current or recent suicidal ideation or behaviors: past SI - safety plan.   Patient denies current or recent homicidal ideation or behaviors.   Patient denies current or recent self injurious behavior or ideation.   Patient denies other safety concerns.   Patient reports there has been no change in risk factors since their last session.     Patient reports there has been no change in protective factors since their last session.     A safety and risk management plan has been developed including: Patient consented to co-developed safety plan on previous visit.  Safety and risk management plan was reviewed.   Patient agreed to use safety plan should any safety concerns arise.  A copy was made available to the patient.     Appearance:   Appropriate    Eye Contact:   Good    Psychomotor Behavior: Normal    Attitude:   Cooperative  Friendly Pleasant   Orientation:   All   Speech    Rate / Production: Normal/ Responsive Normal     Volume:  Normal    Mood:    Normal   Affect:    Appropriate    Thought Content:  Clear    Thought Form:  Coherent  Logical    Insight:    Good      Medication Review:  No changes to current psychiatric medication(s) - however seeing a new psychiatry provider in three weeks due to change in insurance - Dr. Mabel Amos.     Medication Compliance:   Yes     Changes in Health Issues:   None reported     Chemical Use Review:   Substance Use: Chemical use reviewed, no active concerns identified      Tobacco Use: No current tobacco use.      Diagnosis:  No diagnosis found.    Collateral Reports Completed:   Not Applicable    PLAN:  (Patient Tasks / Therapist Tasks / Other)  Try grounding coping skills + add in BLS playlist    Check in in 2-3 weeks.        Swapna Houser, [facility]                                                         ______________________________________________________________________    Individual Treatment Plan    Patient's Name: Ryanne Goff  YOB: 1985    Date of Creation: 8/7/2023    Date Treatment Plan Last Reviewed/Revised: 8/7/2023    DSM5 Diagnoses: 296.52 Bipolar I Disorder Current or Most Recent Episode Depressed, Moderate, 300.02 (F41.1) Generalized Anxiety Disorder, or 309.81 (F43.10) Posttraumatic Stress Disorder (includes Posttraumatic Stress Disorder for Children 6 Years and Younger)  With dissociative symptoms  Psychosocial / Contextual Factors: Ryanne is a 37 year old  American woman who is in a stable supportive relationship and working full time.  PROMIS (reviewed every 90 days): PROMIS-10 Scores        6/2/2023    11:49 AM 6/6/2023     2:27 PM 9/18/2023     9:37 AM   PROMIS-10 Total Score w/o Sub Scores   PROMIS TOTAL - SUBSCORES  25 33    33       Information is confidential and restricted. Go to Review Flowsheets to unlock data.    Multiple values from one day are sorted in reverse-chronological order        Referral / Collaboration:  Referral to another professional/service is not indicated at this time..    Anticipated number of session for this episode of care:  27-36   Anticipation frequency of session: Weekly  Anticipated Duration of each session: 38-52 minutes  Treatment plan will be reviewed in 90 days or when goals have been changed.       MeasurableTreatment Goal(s) related to diagnosis / functional impairment(s)  Goal 1: Patient will feel like she is better able to manage the highs and lows of depression and amber; and will use safety plan every time suicidal thoughts come up.      I will know I've met my goal when I am not dwelling as much on the past, but more  "able to self reflect.        Objective #A (Patient Action)  Patient will use safety plan every time suicidal ideation occurs.    Objective #B (Patient Action)    Patient will  work on using positive self talk and use of reframes when needed .  Status: Continued - Date(s):     Intervention(s)  Therapist will teach  CBT skills, affirmations, wise mind, window of tolerance, and use of checking the facts and reframing when needed .    Objective #C  Patient will identify 2 strategies for more effectively managing Bipolar Disorder - related to being able to reach out to supports - even for a positive distraction.  Status: New - Date: 8/7/2023      Intervention(s)  Therapist will assign homework related to reaching out to supports .      Goal 2: Patient will be able to better manage symptoms of trauma as they are triggered     I will know I've met my goal when I am not feeling as controlled by trauma symptoms - shame/guilt, still thinking about the trauma 3+ hours after it is triggered.      Objective #A (Patient Action)    Status: Continued - Date(s):     Patient will  have a better understanding of how trauma affects the brain .    Intervention(s)  Therapist will teach psychoecuation on trauma and how it affects the brain .    Objective #B  Patient will  develop a set of coping skills related to feeling triggered by past trauma .    Status: New - Date: 8/7/2023      Intervention(s)  Therapist will provide educational materials on coping skills related to trauma - BLS , grounding skills .            Patient has reviewed and agreed to the above plan.      Swapna Houser, [facility]  August 7, 2023           [facility] Goff      SAFETY PLAN:  Step 1: Warning signs / cues (Thoughts, images, mood, situation, behavior) that a crisis may be developing:  Thoughts: \"I'm a burden\", \"I can't do this anymore\" and \"I just want this to end\"  Images: flashbacks " "and visions of harm: self injury  Thinking Processes: racing thoughts, intrusive thoughts (bothersome, unwanted thoughts that come out of nowhere): I don't want to do this, I can't do this and highly critical and negative thoughts: You're not capable, you don't deserve this life  Mood: worsening depression, hopelessness, helplessness, intense anger and agitation  Behaviors: can't stop crying, aggression, not taking care of myself, sleeping too much and not sleeping enough  Situations: anniversary of deaths of important people and trauma    Step 2: Coping strategies - Things I can do to take my mind off of my problems without contacting another person (relaxation technique, physical activity):  Distress Tolerance Strategies:  play with my pet , paced breathing/progressive muscle relaxation and watching a sad movie just allowing self to feel sad  Physical Activities: exercise: walking on walking on pad, deep breathing and stretching   Focus on helpful thoughts:  \"It always passes\" and remind myself of what is important to me: thinking of episodes of depression and how I felt at that time, then thinking of in between times and reminding myself I can do that again  Step 3: People and social settings that provide distraction:              Name: Sister Suma Drew    Phone: [phone]              Name: Garcia  Phone: Number in Phone              Name: David      Phone: Number in Phone  We will work on finding places as a goal    Step 4: Remind myself of people and things that are important to me and worth living for:  Sister, parents, friends, partner, pets     Step 5: When I am in crisis, I can ask these people to help me use my safety plan:              Name: Garcia  Phone: number in phone  Step 6: Making the environment safe:   remove things I could use to hurt myself: give objects to trusted person to dispose of  Step 7: Professionals or agencies I can contact during a crisis:   Local Crisis Services: The new Crisis line " from any phone is 988, you can also text a message to this line.       Professionals or agencies I can contact during a crisis:   Suicide LifeLine Chat: suicidepreventionlifeline.org/chat  Suicide Prevention Lifeline: just dial 988  Crisis Text Line Service (available 24 hours a day, 7 days a week): Text [place] to 223661        COMMUNITY RESOURCES FOR MENTAL HEALTH EMERGENCIES:    [facility] [phone]   COPE 24/7 [place] Mobile Team [phone] (adults) [phone] (child)  Poison Control Center [phone]    OR  go to nearest ER  [facility] ([facility]) [facility]  [phone]  [place] on Mental Illness (www.dio.org): 295.263.3608 or 929-080-4251.   Mental Health Association (www.mentalhealth.org): 920.474.7632 or 464-218-3105.  [employer]/resources for a list of additional resources (SOS)   [facility] [phone]   Urgent Care Adult Mental Health-[phone] mobile unit 24/7 crisis line  [facility] - Address: [address], [place] [address]; Phone: [phone]     Crisis Services By [facility]: Phone Number:   [place]     [phone]   [place]  [phone]   [facility]  [phone]   [place]    [phone]   [facility] [place]    [phone]   [place] [phone]   [place]    [phone]   [place]    [phone]   [place] [phone]   [place]     [phone]         Call 911 or go to my nearest emergency department.       I helped develop this safety plan and agree to use it when needed.  I have been given a copy of this plan.       Client signature __________virtual - client will acknowledge via [employer] they have received and agree to plan._______________________________________________________  Today s date:  7/14/2023  Completed by Provider Name/ Credentials:  RICKI Parker, [facility]                        July 14, 2023  Adapted from Safety Plan Template 2008 Vicky Paez and Kyle Rivers is  reprinted with the express permission of the authors.  No portion of the Safety Plan Template may be reproduced without the express, written permission.  You can contact the authors at bhs@Mobile.[email or web address] or jacqueline@mail.[facility].              Answers for HPI/ROS submitted by the patient on 7/13/2023  If you checked off any problems, how difficult have these problems made it for you to do your work, take care of things at home, or get along with other people?: Not difficult at all  PHQ9 TOTAL SCORE: 1    Answers submitted by the patient for this visit:  Patient Health Questionnaire (Submitted on 8/7/2023)  If you checked off any problems, how difficult have these problems made it for you to do your work, take care of things at home, or get along with other people?: Not difficult at all  PHQ9 TOTAL SCORE: 2Answers submitted by the patient for this visit:  Patient Health Questionnaire (Submitted on 9/18/2023)  If you checked off any problems, how difficult have these problems made it for you to do your work, take care of things at home, or get along with other people?: Not difficult at all  PHQ9 TOTAL SCORE: 1

## 2023-09-24 ASSESSMENT — PATIENT HEALTH QUESTIONNAIRE - PHQ9
SUM OF ALL RESPONSES TO PHQ QUESTIONS 1-9: 2
SUM OF ALL RESPONSES TO PHQ QUESTIONS 1-9: 2

## 2023-09-25 ENCOUNTER — VIRTUAL VISIT (OUTPATIENT)
Dept: PSYCHOLOGY | Facility: CLINIC | Age: 38
End: 2023-09-25
Payer: COMMERCIAL

## 2023-09-25 DIAGNOSIS — F31.62 BIPOLAR 1 DISORDER, MIXED, MODERATE (H): ICD-10-CM

## 2023-09-25 DIAGNOSIS — F43.10 PTSD (POST-TRAUMATIC STRESS DISORDER): Primary | ICD-10-CM

## 2023-09-25 DIAGNOSIS — F41.1 GAD (GENERALIZED ANXIETY DISORDER): ICD-10-CM

## 2023-09-25 PROCEDURE — 90834 PSYTX W PT 45 MINUTES: CPT | Mod: VID | Performed by: SOCIAL WORKER

## 2023-09-25 NOTE — PROGRESS NOTES
[facility]                                     Progress Note    Patient Name: Ryanne Goff  Date: 2023         Service Type: Individual      Session Start Time: 200  Session End Time: 240     Session Length: 38-52    Session #: 7    Attendees: Client attended alone    Service Modality:  Video Visit:      Provider verified identity through the following two step process.  Patient provided:  Patient photo and Patient     Telemedicine Visit: The patient's condition can be safely assessed and treated via synchronous audio and visual telemedicine encounter.      Reason for Telemedicine Visit: Patient has requested telehealth visit    Originating Site (Patient Location): Patient's home    Distant Site (Provider Location): Provider Remote Setting- Home Office    Consent:  The patient/guardian has verbally consented to: the potential risks and benefits of telemedicine (video visit) versus in person care; bill my insurance or make self-payment for services provided; and responsibility for payment of non-covered services.     Patient would like the video invitation sent by:  My Chart    Mode of Communication:  Video Conference via Amwell    Distant Location (Provider):  Off-site    As the provider I attest to compliance with applicable laws and regulations related to telemedicine.    DATA  Interactive Complexity: No  Crisis: No        Progress Since Last Session (Related to Symptoms / Goals / Homework):   Symptoms: Partner is going to be accepting a position and moving to [place] which has stirred up a lot of emotions but Ryanne is coping/managing well and allowing herself to both be sad and excited.  Improving hasn't felt as down, however not sleeping as well - feels this is due to anxiety of starting new position.     Homework: Achieved / completed to satisfaction      Episode of Care Goals: Satisfactory progress - PREPARATION (Decided to change - considering how); Intervened by  negotiating a change plan and determining options / strategies for behavior change, identifying triggers, exploring social supports, and working towards setting a date to begin behavior change     Current / Ongoing Stressors and Concerns:   Starting a new job and really enjoying it, but feeling some imposter syndrome.     Treatment Objective(s) Addressed in This Session:   Treatment planning  Trauma - coping skills     Intervention:   Treatment planning     Psychoeducation on the nervous system and panic.  Gave suggestion of additional coping skills to try BLS play list and how to incorporate this into tool box of coping skills.  Provided positive feedback, validation and empathic listening.       Allowed a safe space for Ryanne to discuss and navigate upcoming life changes.  Encouraged use of identifying emotions and describing them.  Praised Ryanne for her ability to focus on what she can do instead of not do in hard situations and reviewed ways in which she has been able to check the facts and stick with reality.  Explored options for transitioning therapists during the move and discussed upcoming ZARINA, also discussed options of staying with this provider.     CBT skills introduced - catch it check it change it, CBT thought log.  Demonstrated skills with issue that Ryanne is experiencing at work.  Answered questions and encouraged use of positive reframing skills.    Assessments completed prior to visit:  The following assessments were completed by patient for this visit:  PHQ2:       5/5/2022     9:30 AM 5/4/2022     9:06 AM   PHQ-2 ( 1999 [employer])   Q1: Little interest or pleasure in doing things 1 1   Q2: Feeling down, depressed or hopeless 1 1   PHQ-2 Score 2 2   Q1: Little interest or pleasure in doing things  Several days   Q2: Feeling down, depressed or hopeless  Several days   PHQ-2 Score  2     PHQ9:       6/5/2023     2:10 PM 6/12/2023    10:02 AM 7/6/2023     5:32 PM 7/13/2023    10:00 AM 8/7/2023     8:30 AM  9/18/2023     9:37 AM 9/24/2023     5:39 PM   PHQ-9 SCORE   PHQ-9 Total Score MyChart 10 (Moderate depression) 8 (Mild depression) 1 (Minimal depression) 1 (Minimal depression) 2 (Minimal depression) 1 (Minimal depression) 2 (Minimal depression)   PHQ-9 Total Score 10 8 1 1 2 1 2     GAD2:       5/4/2023     1:25 PM 6/6/2023     2:26 PM 8/30/2023    12:31 PM 9/24/2023     5:39 PM   ANU-2   Feeling nervous, anxious, or on edge 3    3 2 1 1   Not being able to stop or control worrying 2    2 2 0 1   ANU-2 Total Score 5    5 4 1 2     GAD7:       5/5/2022     9:30 AM 2/10/2023     1:52 PM 3/15/2023     4:59 PM 4/12/2023     4:28 PM 5/4/2023     1:25 PM 6/6/2023     2:26 PM   ANU-7 SCORE   Total Score  14 (moderate anxiety) 19 (severe anxiety) 11 (moderate anxiety) 14 (moderate anxiety) 10 (moderate anxiety)   Total Score 9 14 19 11 14    14 10     CAGE-AID:       2/10/2023     2:16 PM 5/9/2023    10:00 AM   CAGE-AID Total Score   Total Score     Total Score [facility]         Information is confidential and restricted. Go to Review Flowsheets to unlock data.     PROMIS 10-Global Health (all questions and answers displayed):       2/10/2023     2:16 PM 5/8/2023    12:43 PM 6/2/2023    11:49 AM 6/6/2023     2:27 PM 9/18/2023     9:37 AM   PROMIS 10   In general, would you say your health is:    Good Good   In general, would you say your quality of life is:    Good Very good   In general, how would you rate your physical health?    Good Good   In general, how would you rate your mental health, including your mood and your ability to think?    Poor Good   In general, how would you rate your satisfaction with your social activities and relationships?    Fair Very good   In general, please rate how well you carry out your usual social activities and roles    Poor Good   To what extent are you able to carry out your everyday physical activities such as walking, climbing stairs, carrying groceries, or moving a chair?    Mostly  Completely   In the past 7 days, how often have you been bothered by emotional problems such as feeling anxious, depressed, or irritable?    Sometimes Rarely   In the past 7 days, how would you rate your fatigue on average?    Mild None   In the past 7 days, how would you rate your pain on average, where 0 means no pain, and 10 means worst imaginable pain?    0 0   In general, would you say your health is:    3 3    3   In general, would you say your quality of life is:    3 4    4   In general, how would you rate your physical health?    3 3    3   In general, how would you rate your mental health, including your mood and your ability to think?    1 3    3   In general, how would you rate your satisfaction with your social activities and relationships?    2 4    4   In general, please rate how well you carry out your usual social activities and roles. (This includes activities at home, at work and in your community, and responsibilities as a parent, child, spouse, employee, friend, etc.)    1 3    3   To what extent are you able to carry out your everyday physical activities such as walking, climbing stairs, carrying groceries, or moving a chair?    4 5    5   In the past 7 days, how often have you been bothered by emotional problems such as feeling anxious, depressed, or irritable?    3 2    2   In the past 7 days, how would you rate your fatigue on average?    2 1    1   In the past 7 days, how would you rate your pain on average, where 0 means no pain, and 10 means worst imaginable pain?    0 0    0   Global Mental Health Score    9 15    15   Global Physical Health Score    16 18    18   PROMIS TOTAL - SUBSCORES    25 33    33       Information is confidential and restricted. Go to Review Flowsheets to unlock data.    Multiple values from one day are sorted in reverse-chronological order     PROMIS 10-Global Health (only subscores and total score):       2/10/2023     2:16 PM 5/8/2023    12:43 PM 6/2/2023     "11:49 AM 6/6/2023     2:27 PM 9/18/2023     9:37 AM   PROMIS-10 Scores Only   Global Mental Health Score    9 15    15   Global Physical Health Score    16 18    18   PROMIS TOTAL - SUBSCORES    25 33    33       Information is confidential and restricted. Go to Review Flowsheets to unlock data.    Multiple values from one day are sorted in reverse-chronological order     [place] Suicide Severity Rating Scale (Lifetime/Recent)      5/9/2023    10:00 AM   [place] Suicide Severity Rating (Lifetime/Recent)   Q1 Wish to be Dead (Lifetime) Yes   Comments age 11-disapointing mom   Q2 Non-Specific Active Suicidal Thoughts (Lifetime) No   Most Severe Ideation Rating (Lifetime) 2   Most Severe Ideation Description (Lifetime) SA age 19-cutting, drinking and took vicodin plus    Frequency (Lifetime) 1   Duration (Lifetime) 1   Controllability (Lifetime) 2   Protective Factors  (Lifetime) 1   Reasons for Ideation (Lifetime) 0   Q1 Wished to be Dead (Past Month) yes   Q2 Suicidal Thoughts (Past Month) yes   Q3 Suicidal Thought Method yes   Q4 Suicidal Intent without Specific Plan no   Q5 Suicide Intent with Specific Plan no   Q6 Suicide Behavior (Lifetime) yes   Within the Past 3 Months? no   Level of Risk per Screen moderate risk     [place] Suicide Severity Rating Scale (Short Version)      5/9/2023    10:00 AM 5/30/2023    11:00 AM   [place] Suicide Severity Rating (Short Version)   Q1 Wished to be Dead (Past Month) yes    Q2 Suicidal Thoughts (Past Month) yes    Q3 Suicidal Thought Method yes    Q4 Suicidal Intent without Specific Plan no    Q5 Suicide Intent with Specific Plan no    Q6 Suicide Behavior (Lifetime) yes    Within the Past 3 Months? no    Level of Risk per Screen moderate risk    1. Wish to be Dead (Since Last Contact)  Y   Wish to be Dead Description (Since Last Contact)  Some days \"I want to be unalive\"   2. Non-Specific Active Suicidal Thoughts (Since Last Contact)  N   Actual Attempt (Since " Last Contact)  N   Has subject engaged in non-suicidal self-injurious behavior? (Since Last Contact)  N   Interrupted Attempts (Since Last Contact)  N   Aborted or Self-Interrupted Attempt (Since Last Contact)  N   Preparatory Acts or Behavior (Since Last Contact)  N   Suicide (Since Last Contact)  N   Calculated C-SSRS Risk Score (Since Last Contact)  Low Risk         ASSESSMENT: Current Emotional / Mental Status (status of significant symptoms):   Risk status (Self / Other harm or suicidal ideation)   Patient denies current fears or concerns for personal safety.   Patient reports the following current or recent suicidal ideation or behaviors: past SI - safety plan.   Patient denies current or recent homicidal ideation or behaviors.   Patient denies current or recent self injurious behavior or ideation.   Patient denies other safety concerns.   Patient reports there has been no change in risk factors since their last session.     Patient reports there has been no change in protective factors since their last session.     A safety and risk management plan has been developed including: Patient consented to co-developed safety plan on previous visit.  Safety and risk management plan was reviewed.   Patient agreed to use safety plan should any safety concerns arise.  A copy was made available to the patient.     Appearance:   Appropriate    Eye Contact:   Good    Psychomotor Behavior: Normal    Attitude:   Cooperative  Friendly Pleasant   Orientation:   All   Speech    Rate / Production: Normal/ Responsive Normal     Volume:  Normal    Mood:    Normal   Affect:    Appropriate    Thought Content:  Clear    Thought Form:  Coherent  Logical    Insight:    Good      Medication Review:  No changes to current psychiatric medication(s)  sees Dr. Mabel Amos.     Medication Compliance:   Yes     Changes in Health Issues:   None reported     Chemical Use Review:   Substance Use: Chemical use reviewed, no active concerns  identified      Tobacco Use: No current tobacco use.      Diagnosis:  1. PTSD (post-traumatic stress disorder)    2. Bipolar 1 disorder, mixed, moderate (H)    3. ANU (generalized anxiety disorder)        Collateral Reports Completed:   Not Applicable    PLAN: (Patient Tasks / Therapist Tasks / Other)  Try grounding coping skills + add in BLS playlist  Use CBT skills     Check in in 2-3 weeks.        Swapna Houser, [facility]                                                         ______________________________________________________________________    Individual Treatment Plan    Patient's Name: Ryanne Goff  YOB: 1985    Date of Creation: 8/7/2023    Date Treatment Plan Last Reviewed/Revised: 8/7/2023    DSM5 Diagnoses: 296.52 Bipolar I Disorder Current or Most Recent Episode Depressed, Moderate, 300.02 (F41.1) Generalized Anxiety Disorder, or 309.81 (F43.10) Posttraumatic Stress Disorder (includes Posttraumatic Stress Disorder for Children 6 Years and Younger)  With dissociative symptoms  Psychosocial / Contextual Factors: Ryanne is a 37 year old  American woman who is in a stable supportive relationship and working full time.  PROMIS (reviewed every 90 days): PROMIS-10 Scores        6/2/2023    11:49 AM 6/6/2023     2:27 PM 9/18/2023     9:37 AM   PROMIS-10 Total Score w/o Sub Scores   PROMIS TOTAL - SUBSCORES  25 33    33       Information is confidential and restricted. Go to Review Flowsheets to unlock data.    Multiple values from one day are sorted in reverse-chronological order        Referral / Collaboration:  Referral to another professional/service is not indicated at this time..    Anticipated number of session for this episode of care:  27-36   Anticipation frequency of session: Weekly  Anticipated Duration of each session: 38-52 minutes  Treatment plan will be reviewed in 90 days or when goals have been changed.       MeasurableTreatment Goal(s) related to diagnosis /  functional impairment(s)  Goal 1: Patient will feel like she is better able to manage the highs and lows of depression and amber; and will use safety plan every time suicidal thoughts come up.      I will know I've met my goal when I am not dwelling as much on the past, but more able to self reflect.        Objective #A (Patient Action)  Patient will use safety plan every time suicidal ideation occurs.    Objective #B (Patient Action)    Patient will  work on using positive self talk and use of reframes when needed .  Status: Continued - Date(s):     Intervention(s)  Therapist will teach  CBT skills, affirmations, wise mind, window of tolerance, and use of checking the facts and reframing when needed .    Objective #C  Patient will identify 2 strategies for more effectively managing Bipolar Disorder - related to being able to reach out to supports - even for a positive distraction.  Status: New - Date: 8/7/2023      Intervention(s)  Therapist will assign homework related to reaching out to supports .      Goal 2: Patient will be able to better manage symptoms of trauma as they are triggered     I will know I've met my goal when I am not feeling as controlled by trauma symptoms - shame/guilt, still thinking about the trauma 3+ hours after it is triggered.      Objective #A (Patient Action)    Status: Continued - Date(s):     Patient will  have a better understanding of how trauma affects the brain .    Intervention(s)  Therapist will teach psychoecuation on trauma and how it affects the brain .    Objective #B  Patient will  develop a set of coping skills related to feeling triggered by past trauma .    Status: New - Date: 8/7/2023      Intervention(s)  Therapist will provide educational materials on coping skills related to trauma - BLS , grounding skills .            Patient has reviewed and agreed to the above plan.      Swapna Houser, [facility]  August 7, 2023           [facility]            "Ryanne Goff      SAFETY PLAN:  Step 1: Warning signs / cues (Thoughts, images, mood, situation, behavior) that a crisis may be developing:  Thoughts: \"I'm a burden\", \"I can't do this anymore\" and \"I just want this to end\"  Images: flashbacks and visions of harm: self injury  Thinking Processes: racing thoughts, intrusive thoughts (bothersome, unwanted thoughts that come out of nowhere): I don't want to do this, I can't do this and highly critical and negative thoughts: You're not capable, you don't deserve this life  Mood: worsening depression, hopelessness, helplessness, intense anger and agitation  Behaviors: can't stop crying, aggression, not taking care of myself, sleeping too much and not sleeping enough  Situations: anniversary of deaths of important people and trauma    Step 2: Coping strategies - Things I can do to take my mind off of my problems without contacting another person (relaxation technique, physical activity):  Distress Tolerance Strategies:  play with my pet , paced breathing/progressive muscle relaxation and watching a sad movie just allowing self to feel sad  Physical Activities: exercise: walking on walking on pad, deep breathing and stretching   Focus on helpful thoughts:  \"It always passes\" and remind myself of what is important to me: thinking of episodes of depression and how I felt at that time, then thinking of in between times and reminding myself I can do that again  Step 3: People and social settings that provide distraction:              Name: Sister Suma Drew    Phone: [phone]              Name: Garcia  Phone: Number in Phone              Name: David      Phone: Number in Phone  We will work on finding places as a goal    Step 4: Remind myself of people and things that are important to me and worth living for:  Sister, parents, friends, partner, pets     Step 5: When I am in crisis, I can ask these people to help me use my safety plan:           "    Name: Garcia  Phone: number in phone  Step 6: Making the environment safe:   remove things I could use to hurt myself: give objects to trusted person to dispose of  Step 7: Professionals or agencies I can contact during a crisis:   Local Crisis Services: The new Crisis line from any phone is 988, you can also text a message to this line.       Professionals or agencies I can contact during a crisis:   Suicide LifeLine Chat: suicidepreventionlifeline.org/chat  Suicide Prevention Lifeline: just dial 988  Crisis Text Line Service (available 24 hours a day, 7 days a week): Text [place] to 744665        COMMUNITY RESOURCES FOR MENTAL HEALTH EMERGENCIES:    [facility] [phone]   COPE 24/7 [place] Mobile Team [phone] (adults) [phone] (child)  Poison Control Center [phone]    OR  go to nearest ER  [facility]  [phone]  [employer] [place] [employer]ness (www.dio.org): 680.254.3833 or 726-241-7942.   Mental Health Association (www.mentalhealth.org): 253.849.8556 or 025-206-2870.  SquareClock/resources for a list of additional resources (SOS)   [facility] [phone]   Urgent Care Adult Mental Health-[phone] mobile unit 24/7 crisis line  [facility] [place] [place] - Address: [facility] [place] [address]; Phone: [phone]     Crisis Services By County: Phone Number:   [place]     [phone]   [place]  [phone]   [facility]  [phone]   [place]    [phone]   [facility]    [phone]   [place] [phone]   [place]    [phone]   [place]    [phone]   [place] [phone]   [place]     [phone]         Call 911 or go to my nearest emergency department.       I helped develop this safety plan and agree to use it when needed.  I have been given a copy of this plan.       Client signature __________virtual - client will acknowledge via [employer] they have received and agree to  plan._______________________________________________________  Today s date:  7/14/2023  Completed by Provider Name/ Credentials:  Swapna Houser, RICKI, LICSW                        July 14, 2023  Adapted from Safety Plan Template 2008 Vicky Paez and Kyle Rivers is reprinted with the express permission of the authors.  No portion of the Safety Plan Template may be reproduced without the express, written permission.  You can contact the authors at bhs@[email or web address] or jacqueline@[email or web address].              Answers for HPI/ROS submitted by the patient on 7/13/2023  If you checked off any problems, how difficult have these problems made it for you to do your work, take care of things at home, or get along with other people?: Not difficult at all  PHQ9 TOTAL SCORE: 1    Answers submitted by the patient for this visit:  Patient Health Questionnaire (Submitted on 8/7/2023)  If you checked off any problems, how difficult have these problems made it for you to do your work, take care of things at home, or get along with other people?: Not difficult at all  PHQ9 TOTAL SCORE: 2Answers submitted by the patient for this visit:  Patient Health Questionnaire (Submitted on 9/18/2023)  If you checked off any problems, how difficult have these problems made it for you to do your work, take care of things at home, or get along with other people?: Not difficult at all  PHQ9 TOTAL SCORE: 1Answers submitted by the patient for this visit:  Patient Health Questionnaire (Submitted on 9/24/2023)  PHQ9 TOTAL SCORE: 2

## 2023-10-16 ENCOUNTER — VIRTUAL VISIT (OUTPATIENT)
Dept: PSYCHOLOGY | Facility: CLINIC | Age: 38
End: 2023-10-16
Payer: COMMERCIAL

## 2023-10-16 DIAGNOSIS — F43.10 PTSD (POST-TRAUMATIC STRESS DISORDER): Primary | ICD-10-CM

## 2023-10-16 DIAGNOSIS — F41.1 GAD (GENERALIZED ANXIETY DISORDER): ICD-10-CM

## 2023-10-16 DIAGNOSIS — F31.62 BIPOLAR 1 DISORDER, MIXED, MODERATE (H): ICD-10-CM

## 2023-10-16 PROCEDURE — 90834 PSYTX W PT 45 MINUTES: CPT | Mod: VID | Performed by: SOCIAL WORKER

## 2023-10-16 NOTE — PROGRESS NOTES
[facility]                                     Progress Note    Patient Name: Ryanne Goff  Date: 10/16/2023         Service Type: Individual      Session Start Time: 200  Session End Time: 240     Session Length: 38-52    Session #: 8    Attendees: Client attended alone    Service Modality:  Video Visit:      Provider verified identity through the following two step process.  Patient provided:  Patient photo and Patient     Telemedicine Visit: The patient's condition can be safely assessed and treated via synchronous audio and visual telemedicine encounter.      Reason for Telemedicine Visit: Patient has requested telehealth visit    Originating Site (Patient Location): Patient's home    Distant Site (Provider Location): Provider Remote Setting- Home Office    Consent:  The patient/guardian has verbally consented to: the potential risks and benefits of telemedicine (video visit) versus in person care; bill my insurance or make self-payment for services provided; and responsibility for payment of non-covered services.     Patient would like the video invitation sent by:  My Chart    Mode of Communication:  Video Conference via [employer]well    Distant Location (Provider):  Off-site    As the provider I attest to compliance with applicable laws and regulations related to telemedicine.    DATA  Interactive Complexity: No  Crisis: No        Progress Since Last Session (Related to Symptoms / Goals / Homework):   Symptoms: Ryanne's partner has accepted a position in [place] and she will be eventually moving there.  This has caused a lot of stress and is approaching quickly.  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 She's stated she's nervous about what this move means for her mental health but wants to make sure she gives the move a chance for her relationship with her  partner.  She plans to reevaluate in 6 months how things are going, but knows she has a good support network and coping skills in the meantime.    Homework: Achieved / completed to satisfaction      Episode of Care Goals: Satisfactory progress - PREPARATION (Decided to change - considering how); Intervened by negotiating a change plan and determining options / strategies for behavior change, identifying triggers, exploring social supports, and working towards setting a date to begin behavior change     Current / Ongoing Stressors and Concerns:   Starting a new job and really enjoying it, but feeling some imposter syndrome.     Treatment Objective(s) Addressed in This Session:   Treatment planning  Trauma - coping skills     Intervention:   Treatment planning     Psychoeducation on the nervous system and panic.  Gave suggestion of additional coping skills to try BLS play list and how to incorporate this into tool box of coping skills.  Provided positive feedback, validation and empathic listening.       Allowed a safe space for Ryanne to discuss and navigate upcoming life changes.  Encouraged use of identifying emotions and describing them.  Praised Ryanne for her ability to focus on what she can do instead of not do in hard situations and reviewed ways in which she has been able to check the facts and stick with reality.  Explored options for transitioning therapists during the move and discussed upcoming ZARINA, also discussed options of staying with this provider.     CBT skills introduced - catch it check it change it, CBT thought log.  Demonstrated skills with issue that Ryanne is experiencing at work.  Answered questions and encouraged use of positive reframing skills.     Solutions focused work - focused on upcoming move. Supported client in her processing of emotions and big changes.  Used CBT skills to help reframe when appropriate.  Reviewed safety concerns - reporting none at the present and is able to keep self  safe using safety plan.  Reviewed use of CBT skills outside of therapy and as well as BLS.    Assessments completed prior to visit:  The following assessments were completed by patient for this visit:  PHQ2:       5/5/2022     9:30 AM 5/4/2022     9:06 AM   PHQ-2 ( 1999 [employer])   Q1: Little interest or pleasure in doing things 1 1   Q2: Feeling down, depressed or hopeless 1 1   PHQ-2 Score 2 2   Q1: Little interest or pleasure in doing things  Several days   Q2: Feeling down, depressed or hopeless  Several days   PHQ-2 Score  2     PHQ9:       6/5/2023     2:10 PM 6/12/2023    10:02 AM 7/6/2023     5:32 PM 7/13/2023    10:00 AM 8/7/2023     8:30 AM 9/18/2023     9:37 AM 9/24/2023     5:39 PM   PHQ-9 SCORE   PHQ-9 Total Score MyChart 10 (Moderate depression) 8 (Mild depression) 1 (Minimal depression) 1 (Minimal depression) 2 (Minimal depression) 1 (Minimal depression) 2 (Minimal depression)   PHQ-9 Total Score 10 8 1 1 2 1 2     GAD2:       5/4/2023     1:25 PM 6/6/2023     2:26 PM 8/30/2023    12:31 PM 9/24/2023     5:39 PM 10/13/2023    11:45 AM   ANU-2   Feeling nervous, anxious, or on edge 3    3 2 1 1 1   Not being able to stop or control worrying 2    2 2 0 1 1   ANU-2 Total Score 5    5 4 1 2 2     GAD7:       5/5/2022     9:30 AM 2/10/2023     1:52 PM 3/15/2023     4:59 PM 4/12/2023     4:28 PM 5/4/2023     1:25 PM 6/6/2023     2:26 PM   ANU-7 SCORE   Total Score  14 (moderate anxiety) 19 (severe anxiety) 11 (moderate anxiety) 14 (moderate anxiety) 10 (moderate anxiety)   Total Score 9 14 19 11 14    14 10     CAGE-AID:       2/10/2023     2:16 PM 5/9/2023    10:00 AM   CAGE-AID Total Score   Total Score     Total Score MyChart         Information is confidential and restricted. Go to Review Flowsheets to unlock data.     PROMIS 10-Global Health (all questions and answers displayed):       2/10/2023     2:16 PM 5/8/2023    12:43 PM 6/2/2023    11:49 AM 6/6/2023     2:27 PM 9/18/2023     9:37 AM   PROMIS 10    In general, would you say your health is:    Good Good   In general, would you say your quality of life is:    Good Very good   In general, how would you rate your physical health?    Good Good   In general, how would you rate your mental health, including your mood and your ability to think?    Poor Good   In general, how would you rate your satisfaction with your social activities and relationships?    Fair Very good   In general, please rate how well you carry out your usual social activities and roles    Poor Good   To what extent are you able to carry out your everyday physical activities such as walking, climbing stairs, carrying groceries, or moving a chair?    Mostly Completely   In the past 7 days, how often have you been bothered by emotional problems such as feeling anxious, depressed, or irritable?    Sometimes Rarely   In the past 7 days, how would you rate your fatigue on average?    Mild None   In the past 7 days, how would you rate your pain on average, where 0 means no pain, and 10 means worst imaginable pain?    0 0   In general, would you say your health is:    3 3    3   In general, would you say your quality of life is:    3 4    4   In general, how would you rate your physical health?    3 3    3   In general, how would you rate your mental health, including your mood and your ability to think?    1 3    3   In general, how would you rate your satisfaction with your social activities and relationships?    2 4    4   In general, please rate how well you carry out your usual social activities and roles. (This includes activities at home, at work and in your community, and responsibilities as a parent, child, spouse, employee, friend, etc.)    1 3    3   To what extent are you able to carry out your everyday physical activities such as walking, climbing stairs, carrying groceries, or moving a chair?    4 5    5   In the past 7 days, how often have you been bothered by emotional problems such as  feeling anxious, depressed, or irritable?    3 2    2   In the past 7 days, how would you rate your fatigue on average?    2 1    1   In the past 7 days, how would you rate your pain on average, where 0 means no pain, and 10 means worst imaginable pain?    0 0    0   Global Mental Health Score    9 15    15   Global Physical Health Score    16 18    18   PROMIS TOTAL - SUBSCORES    25 33    33       Information is confidential and restricted. Go to Review Flowsheets to unlock data.    Multiple values from one day are sorted in reverse-chronological order     PROMIS 10-Global Health (only subscores and total score):       2/10/2023     2:16 PM 5/8/2023    12:43 PM 6/2/2023    11:49 AM 6/6/2023     2:27 PM 9/18/2023     9:37 AM   PROMIS-10 Scores Only   Global Mental Health Score    9 15    15   Global Physical Health Score    16 18    18   PROMIS TOTAL - SUBSCORES    25 33    33       Information is confidential and restricted. Go to Review Flowsheets to unlock data.    Multiple values from one day are sorted in reverse-chronological order     [place] Suicide Severity Rating Scale (Lifetime/Recent)      5/9/2023    10:00 AM   [place] Suicide Severity Rating (Lifetime/Recent)   Q1 Wish to be Dead (Lifetime) Yes   Comments age 11-disapointing mom   Q2 Non-Specific Active Suicidal Thoughts (Lifetime) No   Most Severe Ideation Rating (Lifetime) 2   Most Severe Ideation Description (Lifetime) SA age 19-cutting, drinking and took vicodin plus    Frequency (Lifetime) 1   Duration (Lifetime) 1   Controllability (Lifetime) 2   Protective Factors  (Lifetime) 1   Reasons for Ideation (Lifetime) 0   Q1 Wished to be Dead (Past Month) yes   Q2 Suicidal Thoughts (Past Month) yes   Q3 Suicidal Thought Method yes   Q4 Suicidal Intent without Specific Plan no   Q5 Suicide Intent with Specific Plan no   Q6 Suicide Behavior (Lifetime) yes   Within the Past 3 Months? no   Level of Risk per Screen moderate risk     [place]  "Suicide Severity Rating Scale (Short Version)      5/9/2023    10:00 AM 5/30/2023    11:00 AM   [place] Suicide Severity Rating (Short Version)   Q1 Wished to be Dead (Past Month) yes    Q2 Suicidal Thoughts (Past Month) yes    Q3 Suicidal Thought Method yes    Q4 Suicidal Intent without Specific Plan no    Q5 Suicide Intent with Specific Plan no    Q6 Suicide Behavior (Lifetime) yes    Within the Past 3 Months? no    Level of Risk per Screen moderate risk    1. Wish to be Dead (Since Last Contact)  Y   Wish to be Dead Description (Since Last Contact)  Some days \"I want to be unalive\"   2. Non-Specific Active Suicidal Thoughts (Since Last Contact)  N   Actual Attempt (Since Last Contact)  N   Has subject engaged in non-suicidal self-injurious behavior? (Since Last Contact)  N   Interrupted Attempts (Since Last Contact)  N   Aborted or Self-Interrupted Attempt (Since Last Contact)  N   Preparatory Acts or Behavior (Since Last Contact)  N   Suicide (Since Last Contact)  N   Calculated C-SSRS Risk Score (Since Last Contact)  Low Risk         ASSESSMENT: Current Emotional / Mental Status (status of significant symptoms):   Risk status (Self / Other harm or suicidal ideation)   Patient denies current fears or concerns for personal safety.   Patient reports the following current or recent suicidal ideation or behaviors: past SI - safety plan.   Patient denies current or recent homicidal ideation or behaviors.   Patient denies current or recent self injurious behavior or ideation.   Patient denies other safety concerns.   Patient reports there has been no change in risk factors since their last session.     Patient reports there has been no change in protective factors since their last session.     A safety and risk management plan has been developed including: Patient consented to co-developed safety plan on previous visit.  Safety and risk management plan was reviewed.   Patient agreed to use safety plan should any " safety concerns arise.  A copy was made available to the patient.     Appearance:   Appropriate    Eye Contact:   Good    Psychomotor Behavior: Normal    Attitude:   Cooperative  Friendly Pleasant   Orientation:   All   Speech    Rate / Production: Normal/ Responsive Normal     Volume:  Normal    Mood:    Normal   Affect:    Appropriate    Thought Content:  Clear    Thought Form:  Coherent  Logical    Insight:    Good      Medication Review:  No changes to current psychiatric medication(s)  sees Dr. Mabel Amos.     Medication Compliance:   Yes     Changes in Health Issues:   None reported     Chemical Use Review:   Substance Use: Chemical use reviewed, no active concerns identified      Tobacco Use: No current tobacco use.      Diagnosis:  PTSD  Bipolar 1 Mixed Moderate  ANU      Collateral Reports Completed:   Not Applicable    PLAN: (Patient Tasks / Therapist Tasks / Other)  Try grounding coping skills + add in BLS playlist  Use CBT skills     Check in in 2-3 weeks.        Swapna Houser, [facility]                                                         ______________________________________________________________________    Individual Treatment Plan    Patient's Name: Ryanne Goff  YOB: 1985    Date of Creation: 8/7/2023    Date Treatment Plan Last Reviewed/Revised: 8/7/2023    DSM5 Diagnoses: 296.52 Bipolar I Disorder Current or Most Recent Episode Depressed, Moderate, 300.02 (F41.1) Generalized Anxiety Disorder, or 309.81 (F43.10) Posttraumatic Stress Disorder (includes Posttraumatic Stress Disorder for Children 6 Years and Younger)  With dissociative symptoms  Psychosocial / Contextual Factors: Ryanne is a 37 year old  American woman who is in a stable supportive relationship and working full time.  PROMIS (reviewed every 90 days): PROMIS-10 Scores        6/2/2023    11:49 AM 6/6/2023     2:27 PM 9/18/2023     9:37 AM   PROMIS-10 Total Score w/o Sub Scores   PROMIS TOTAL -  SUBSCORES  25 33    33       Information is confidential and restricted. Go to Review Flowsheets to unlock data.    Multiple values from one day are sorted in reverse-chronological order        Referral / Collaboration:  Referral to another professional/service is not indicated at this time..    Anticipated number of session for this episode of care:  27-36   Anticipation frequency of session: Weekly  Anticipated Duration of each session: 38-52 minutes  Treatment plan will be reviewed in 90 days or when goals have been changed.       MeasurableTreatment Goal(s) related to diagnosis / functional impairment(s)  Goal 1: Patient will feel like she is better able to manage the highs and lows of depression and amber; and will use safety plan every time suicidal thoughts come up.      I will know I've met my goal when I am not dwelling as much on the past, but more able to self reflect.        Objective #A (Patient Action)  Patient will use safety plan every time suicidal ideation occurs.    Objective #B (Patient Action)    Patient will  work on using positive self talk and use of reframes when needed .  Status: Continued - Date(s):     Intervention(s)  Therapist will teach  CBT skills, affirmations, wise mind, window of tolerance, and use of checking the facts and reframing when needed .    Objective #C  Patient will identify 2 strategies for more effectively managing Bipolar Disorder - related to being able to reach out to supports - even for a positive distraction.  Status: New - Date: 8/7/2023      Intervention(s)  Therapist will assign homework related to reaching out to supports .      Goal 2: Patient will be able to better manage symptoms of trauma as they are triggered     I will know I've met my goal when I am not feeling as controlled by trauma symptoms - shame/guilt, still thinking about the trauma 3+ hours after it is triggered.      Objective #A (Patient Action)    Status: Continued - Date(s):     Patient will  " have a better understanding of how trauma affects the brain .    Intervention(s)  Therapist will teach psychoecuation on trauma and how it affects the brain .    Objective #B  Patient will  develop a set of coping skills related to feeling triggered by past trauma .    Status: New - Date: 8/7/2023      Intervention(s)  Therapist will provide educational materials on coping skills related to trauma - BLS , grounding skills .            Patient has reviewed and agreed to the above plan.      Swapna Houser, [facility]  August 7, 2023           [facility]                                        Ryanne Goff      SAFETY PLAN:  Step 1: Warning signs / cues (Thoughts, images, mood, situation, behavior) that a crisis may be developing:  Thoughts: \"I'm a burden\", \"I can't do this anymore\" and \"I just want this to end\"  Images: flashbacks and visions of harm: self injury  Thinking Processes: racing thoughts, intrusive thoughts (bothersome, unwanted thoughts that come out of nowhere): I don't want to do this, I can't do this and highly critical and negative thoughts: You're not capable, you don't deserve this life  Mood: worsening depression, hopelessness, helplessness, intense anger and agitation  Behaviors: can't stop crying, aggression, not taking care of myself, sleeping too much and not sleeping enough  Situations: anniversary of deaths of important people and trauma    Step 2: Coping strategies - Things I can do to take my mind off of my problems without contacting another person (relaxation technique, physical activity):  Distress Tolerance Strategies:  play with my pet , paced breathing/progressive muscle relaxation and watching a sad movie just allowing self to feel sad  Physical Activities: exercise: walking on walking on pad, deep breathing and stretching   Focus on helpful thoughts:  \"It always passes\" and remind myself of what is important to me: thinking of episodes of depression and " how I felt at that time, then thinking of in between times and reminding myself I can do that again  Step 3: People and social settings that provide distraction:              Name: Sister Suma Drew    Phone: [phone]              Name: Garcia  Phone: Number in Phone              Name: David      Phone: Number in Phone  We will work on finding places as a goal    Step 4: Remind myself of people and things that are important to me and worth living for:  Sister, parents, friends, partner, pets     Step 5: When I am in crisis, I can ask these people to help me use my safety plan:              Name: Garcia  Phone: number in phone  Step 6: Making the environment safe:   remove things I could use to hurt myself: give objects to trusted person to dispose of  Step 7: Professionals or agencies I can contact during a crisis:   Local Crisis Services: The new Crisis line from any phone is 988, you can also text a message to this line.       Professionals or agencies I can contact during a crisis:   Suicide LifeLine Chat: suicidepreventionlifeline.org/chat  Suicide Prevention Lifeline: just dial 988  Crisis Text Line Service (available 24 hours a day, 7 days a week): Text [place] to 223212        COMMUNITY RESOURCES FOR MENTAL HEALTH EMERGENCIES:    [facility] [phone]   COPE 24/7 [place] Mobile Team [phone] (adults) [phone] (child)  Poison Control Center [phone]    OR  go to nearest ER  [facility] ([facility]) [facility]  [phone]  National [place] on Mental Illness (www.dio.org): 451.793.1995 or 886-943-3416.   Mental Health Association (www.mentalhealth.org): 422.771.8950 or 123-737-3717.  [employer]/resources for a list of additional resources (SOS)   [facility] [phone]   Urgent Care Adult Mental Health-[phone] mobile unit 24/7 crisis line  EMPATH UNIT [facility] - Address: [facility] [place] [address]; Phone: [phone]      Crisis Services By County: Phone Number:   [place]     [phone]   [place]  [phone]   [facility]  [phone]   [place]    [phone]   [place]/ [place]    [phone]   [place] [phone]   [place]    [phone]   [place]    [phone]   [place] [phone]   [place]     [phone]         Call 911 or go to my nearest emergency department.       I helped develop this safety plan and agree to use it when needed.  I have been given a copy of this plan.       Client signature __________virtual - client will acknowledge via [employer] they have received and agree to plan._______________________________________________________  Today s date:  7/14/2023  Completed by Provider Name/ Credentials:  RICKI Parker, [facility]                        July 14, 2023  Adapted from Safety Plan Template 2008 Vicky Paez and [employer] is reprinted with the express permission of the authors.  No portion of the Safety Plan Template may be reproduced without the express, written permission.  You can contact the authors at bhs@[email or web address] or [email or web address].              Answers for HPI/ROS submitted by the patient on 7/13/2023  If you checked off any problems, how difficult have these problems made it for you to do your work, take care of things at home, or get along with other people?: Not difficult at all  PHQ9 TOTAL SCORE: 1    Answers submitted by the patient for this visit:  Patient Health Questionnaire (Submitted on 8/7/2023)  If you checked off any problems, how difficult have these problems made it for you to do your work, take care of things at home, or get along with other people?: Not difficult at all  PHQ9 TOTAL SCORE: 2Answers submitted by the patient for this visit:  Patient Health Questionnaire (Submitted on 9/18/2023)  If you checked off any problems, how difficult have these problems made it for you to do your work, take care of things at home, or get along with other  people?: Not difficult at all  PHQ9 TOTAL SCORE: 1Answers submitted by the patient for this visit:  Patient Health Questionnaire (Submitted on 9/24/2023)  PHQ9 TOTAL SCORE: 2

## 2023-10-30 ENCOUNTER — MYC MEDICAL ADVICE (OUTPATIENT)
Dept: FAMILY MEDICINE | Facility: CLINIC | Age: 38
End: 2023-10-30
Payer: COMMERCIAL

## 2023-10-31 NOTE — TELEPHONE ENCOUNTER
RN replied to patient via [employer]. See message for details.     Cornelius Montana RN, BSN, PHN  [facility]: [place]

## 2023-11-06 ENCOUNTER — VIRTUAL VISIT (OUTPATIENT)
Dept: PSYCHOLOGY | Facility: CLINIC | Age: 38
End: 2023-11-06
Payer: COMMERCIAL

## 2023-11-06 DIAGNOSIS — F31.62 BIPOLAR 1 DISORDER, MIXED, MODERATE (H): ICD-10-CM

## 2023-11-06 DIAGNOSIS — F41.1 GAD (GENERALIZED ANXIETY DISORDER): ICD-10-CM

## 2023-11-06 DIAGNOSIS — F43.10 PTSD (POST-TRAUMATIC STRESS DISORDER): Primary | ICD-10-CM

## 2023-11-06 PROCEDURE — 90834 PSYTX W PT 45 MINUTES: CPT | Mod: VID | Performed by: SOCIAL WORKER

## 2023-11-06 NOTE — PROGRESS NOTES
[facility]                                     Progress Note    Patient Name: Ryanne Goff  Date: 2023         Service Type: Individual      Session Start Time: 200  Session End Time: 240     Session Length: 38-52    Session #: 9    Attendees: Client attended alone    Service Modality:  Video Visit:      Provider verified identity through the following two step process.  Patient provided:  Patient photo and Patient     Telemedicine Visit: The patient's condition can be safely assessed and treated via synchronous audio and visual telemedicine encounter.      Reason for Telemedicine Visit: Patient has requested telehealth visit    Originating Site (Patient Location): Patient's home    Distant Site (Provider Location): Provider Remote Setting- Home Office    Consent:  The patient/guardian has verbally consented to: the potential risks and benefits of telemedicine (video visit) versus in person care; bill my insurance or make self-payment for services provided; and responsibility for payment of non-covered services.     Patient would like the video invitation sent by:  My Chart    Mode of Communication:  Video Conference via Amwell    Distant Location (Provider):  Off-site    As the provider I attest to compliance with applicable laws and regulations related to telemedicine.    DATA  Interactive Complexity: No  Crisis: No        Progress Since Last Session (Related to Symptoms / Goals / Homework):   Symptoms: Ryanne's partner has accepted a position in [place] and she will be eventually moving there.  This has caused a lot of stress and is approaching quickly.  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 She's stated she's nervous about what this move means for her mental health but wants to make sure she gives the move a chance for her relationship with her  partner.  She plans to reevaluate in 6 months how things are going, but knows she has a good support network and coping skills in the meantime.    Homework: Achieved / completed to satisfaction      Episode of Care Goals: Satisfactory progress - PREPARATION (Decided to change - considering how); Intervened by negotiating a change plan and determining options / strategies for behavior change, identifying triggers, exploring social supports, and working towards setting a date to begin behavior change     Current / Ongoing Stressors and Concerns:   Starting a new job and really enjoying it, but feeling some imposter syndrome.     Treatment Objective(s) Addressed in This Session:   Treatment planning  Trauma - coping skills     Intervention:   Treatment planning     Psychoeducation on the nervous system and panic.  Gave suggestion of additional coping skills to try BLS play list and how to incorporate this into tool box of coping skills.  Provided positive feedback, validation and empathic listening.       Allowed a safe space for Ryanne to discuss and navigate upcoming life changes.  Encouraged use of identifying emotions and describing them.  Praised Ryanne for her ability to focus on what she can do instead of not do in hard situations and reviewed ways in which she has been able to check the facts and stick with reality.  Explored options for transitioning therapists during the move and discussed upcoming ZARINA, also discussed options of staying with this provider.     CBT skills introduced - catch it check it change it, CBT thought log.  Demonstrated skills with issue that Ryanne is experiencing at work.  Answered questions and encouraged use of positive reframing skills.     Solutions focused work - focused on upcoming move. Supported client in her processing of emotions and big changes.  Used CBT skills to help reframe when appropriate.  Reviewed safety concerns - reporting none at the present and is able to keep self  safe using safety plan.  Reviewed use of CBT skills outside of therapy and as well as BLS.    Reviewed safety with no concerns, updated treatment plan, psychoeducation on stages of grief and how it relates to big life changes Ryanne is experiencing.  Encouraged and offered safe space to further discuss and process difficult emotions.      Assessments completed prior to visit:  The following assessments were completed by patient for this visit:  PHQ2:       5/5/2022     9:30 AM 5/4/2022     9:06 AM   PHQ-2 ( 1999 [employer])   Q1: Little interest or pleasure in doing things 1 1   Q2: Feeling down, depressed or hopeless 1 1   PHQ-2 Score 2 2   Q1: Little interest or pleasure in doing things  Several days   Q2: Feeling down, depressed or hopeless  Several days   PHQ-2 Score  2     PHQ9:       6/12/2023    10:02 AM 7/6/2023     5:32 PM 7/13/2023    10:00 AM 8/7/2023     8:30 AM 9/18/2023     9:37 AM 9/24/2023     5:39 PM 11/6/2023     8:31 AM   PHQ-9 SCORE   PHQ-9 Total Score MyChart 8 (Mild depression) 1 (Minimal depression) 1 (Minimal depression) 2 (Minimal depression) 1 (Minimal depression) 2 (Minimal depression) 1 (Minimal depression)   PHQ-9 Total Score 8 1 1 2 1 2 1     GAD2:       5/4/2023     1:25 PM 6/6/2023     2:26 PM 8/30/2023    12:31 PM 9/24/2023     5:39 PM 10/13/2023    11:45 AM   ANU-2   Feeling nervous, anxious, or on edge 3    3 2 1 1 1   Not being able to stop or control worrying 2    2 2 0 1 1   ANU-2 Total Score 5    5 4 1 2 2     GAD7:       5/5/2022     9:30 AM 2/10/2023     1:52 PM 3/15/2023     4:59 PM 4/12/2023     4:28 PM 5/4/2023     1:25 PM 6/6/2023     2:26 PM   ANU-7 SCORE   Total Score  14 (moderate anxiety) 19 (severe anxiety) 11 (moderate anxiety) 14 (moderate anxiety) 10 (moderate anxiety)   Total Score 9 14 19 11 14    14 10     CAGE-AID:       2/10/2023     2:16 PM 5/9/2023    10:00 AM   CAGE-AID Total Score   Total Score     Total Score MyChart         Information is confidential and  restricted. Go to Review Flowsheets to unlock data.     PROMIS 10-Global Health (all questions and answers displayed):       2/10/2023     2:16 PM 5/8/2023    12:43 PM 6/2/2023    11:49 AM 6/6/2023     2:27 PM 9/18/2023     9:37 AM   PROMIS 10   In general, would you say your health is:    Good Good   In general, would you say your quality of life is:    Good Very good   In general, how would you rate your physical health?    Good Good   In general, how would you rate your mental health, including your mood and your ability to think?    Poor Good   In general, how would you rate your satisfaction with your social activities and relationships?    Fair Very good   In general, please rate how well you carry out your usual social activities and roles    Poor Good   To what extent are you able to carry out your everyday physical activities such as walking, climbing stairs, carrying groceries, or moving a chair?    Mostly Completely   In the past 7 days, how often have you been bothered by emotional problems such as feeling anxious, depressed, or irritable?    Sometimes Rarely   In the past 7 days, how would you rate your fatigue on average?    Mild None   In the past 7 days, how would you rate your pain on average, where 0 means no pain, and 10 means worst imaginable pain?    0 0   In general, would you say your health is:    3 3    3   In general, would you say your quality of life is:    3 4    4   In general, how would you rate your physical health?    3 3    3   In general, how would you rate your mental health, including your mood and your ability to think?    1 3    3   In general, how would you rate your satisfaction with your social activities and relationships?    2 4    4   In general, please rate how well you carry out your usual social activities and roles. (This includes activities at home, at work and in your community, and responsibilities as a parent, child, spouse, employee, friend, etc.)    1 3    3    To what extent are you able to carry out your everyday physical activities such as walking, climbing stairs, carrying groceries, or moving a chair?    4 5    5   In the past 7 days, how often have you been bothered by emotional problems such as feeling anxious, depressed, or irritable?    3 2    2   In the past 7 days, how would you rate your fatigue on average?    2 1    1   In the past 7 days, how would you rate your pain on average, where 0 means no pain, and 10 means worst imaginable pain?    0 0    0   Global Mental Health Score    9 15    15   Global Physical Health Score    16 18    18   PROMIS TOTAL - SUBSCORES    25 33    33       Information is confidential and restricted. Go to Review Flowsheets to unlock data.    Multiple values from one day are sorted in reverse-chronological order     PROMIS 10-Global Health (only subscores and total score):       2/10/2023     2:16 PM 5/8/2023    12:43 PM 6/2/2023    11:49 AM 6/6/2023     2:27 PM 9/18/2023     9:37 AM   PROMIS-10 Scores Only   Global Mental Health Score    9 15    15   Global Physical Health Score    16 18    18   PROMIS TOTAL - SUBSCORES    25 33    33       Information is confidential and restricted. Go to Review Flowsheets to unlock data.    Multiple values from one day are sorted in reverse-chronological order     [place] Suicide Severity Rating Scale (Lifetime/Recent)      5/9/2023    10:00 AM   [place] Suicide Severity Rating (Lifetime/Recent)   Q1 Wish to be Dead (Lifetime) Yes   Comments age 11-disapointing mom   Q2 Non-Specific Active Suicidal Thoughts (Lifetime) No   Most Severe Ideation Rating (Lifetime) 2   Most Severe Ideation Description (Lifetime) SA age 19-cutting, drinking and took vicodin plus    Frequency (Lifetime) 1   Duration (Lifetime) 1   Controllability (Lifetime) 2   Protective Factors  (Lifetime) 1   Reasons for Ideation (Lifetime) 0   Q1 Wished to be Dead (Past Month) yes   Q2 Suicidal Thoughts (Past Month)  "yes   Q3 Suicidal Thought Method yes   Q4 Suicidal Intent without Specific Plan no   Q5 Suicide Intent with Specific Plan no   Q6 Suicide Behavior (Lifetime) yes   Within the Past 3 Months? no   Level of Risk per Screen moderate risk     [place] Suicide Severity Rating Scale (Short Version)      5/9/2023    10:00 AM 5/30/2023    11:00 AM   [place] Suicide Severity Rating (Short Version)   Q1 Wished to be Dead (Past Month) yes    Q2 Suicidal Thoughts (Past Month) yes    Q3 Suicidal Thought Method yes    Q4 Suicidal Intent without Specific Plan no    Q5 Suicide Intent with Specific Plan no    Q6 Suicide Behavior (Lifetime) yes    Within the Past 3 Months? no    Level of Risk per Screen moderate risk    1. Wish to be Dead (Since Last Contact)  Y   Wish to be Dead Description (Since Last Contact)  Some days \"I want to be unalive\"   2. Non-Specific Active Suicidal Thoughts (Since Last Contact)  N   Actual Attempt (Since Last Contact)  N   Has subject engaged in non-suicidal self-injurious behavior? (Since Last Contact)  N   Interrupted Attempts (Since Last Contact)  N   Aborted or Self-Interrupted Attempt (Since Last Contact)  N   Preparatory Acts or Behavior (Since Last Contact)  N   Suicide (Since Last Contact)  N   Calculated C-SSRS Risk Score (Since Last Contact)  Low Risk         ASSESSMENT: Current Emotional / Mental Status (status of significant symptoms):   Risk status (Self / Other harm or suicidal ideation)   Patient denies current fears or concerns for personal safety.   Patient reports the following current or recent suicidal ideation or behaviors: past SI - safety plan.   Patient denies current or recent homicidal ideation or behaviors.   Patient denies current or recent self injurious behavior or ideation.   Patient denies other safety concerns.   Patient reports there has been no change in risk factors since their last session.     Patient reports there has been no change in protective factors since " their last session.     A safety and risk management plan has been developed including: Patient consented to co-developed safety plan on previous visit.  Safety and risk management plan was reviewed.   Patient agreed to use safety plan should any safety concerns arise.  A copy was made available to the patient.     Appearance:   Appropriate    Eye Contact:   Good    Psychomotor Behavior: Normal    Attitude:   Cooperative  Friendly Pleasant   Orientation:   All   Speech    Rate / Production: Normal/ Responsive Normal     Volume:  Normal    Mood:    Normal   Affect:    Appropriate    Thought Content:  Clear    Thought Form:  Coherent  Logical    Insight:    Good      Medication Review:  No changes to current psychiatric medication(s)  sees Dr. Mabel Amos.     Medication Compliance:   Yes     Changes in Health Issues:   None reported     Chemical Use Review:   Substance Use: Chemical use reviewed, no active concerns identified      Tobacco Use: No current tobacco use.      Diagnosis:  PTSD  Bipolar 1 Mixed Moderate  ANU      Collateral Reports Completed:   Not Applicable    PLAN: (Patient Tasks / Therapist Tasks / Other)  Try grounding coping skills + add in BLS playlist  Use CBT skills     Check in in 2-3 weeks.        Swapna Houser, [facility]                                                         ______________________________________________________________________    Individual Treatment Plan    Patient's Name: Ryanne Goff  YOB: 1985    Date of Creation: 8/7/2023    Date Treatment Plan Last Reviewed/Revised: 8/7/2023    DSM5 Diagnoses: 296.52 Bipolar I Disorder Current or Most Recent Episode Depressed, Moderate, 300.02 (F41.1) Generalized Anxiety Disorder, or 309.81 (F43.10) Posttraumatic Stress Disorder (includes Posttraumatic Stress Disorder for Children 6 Years and Younger)  With dissociative symptoms  Psychosocial / Contextual Factors: Ryanne is a 37 year old  American woman  who is in a stable supportive relationship and working full time.  PROMIS (reviewed every 90 days): PROMIS-10 Scores        6/2/2023    11:49 AM 6/6/2023     2:27 PM 9/18/2023     9:37 AM   PROMIS-10 Total Score w/o Sub Scores   PROMIS TOTAL - SUBSCORES  25 33    33       Information is confidential and restricted. Go to Review Flowsheets to unlock data.    Multiple values from one day are sorted in reverse-chronological order        Referral / Collaboration:  Referral to another professional/service is not indicated at this time..    Anticipated number of session for this episode of care:  27-36   Anticipation frequency of session: Weekly  Anticipated Duration of each session: 38-52 minutes  Treatment plan will be reviewed in 90 days or when goals have been changed.       MeasurableTreatment Goal(s) related to diagnosis / functional impairment(s)  Goal 1: Patient will feel like she is better able to manage the highs and lows of depression and amber; and will use safety plan every time suicidal thoughts come up.      I will know I've met my goal when I am not dwelling as much on the past, but more able to self reflect.        Objective #A (Patient Action)  Patient will use safety plan every time suicidal ideation occurs.    Intervention(s)  Create and use a safety plan, utilize crisis supports  Status: Continued - Date(s): 11/6/2023    Objective #B (Patient Action)    Patient will  work on using positive self talk and use of reframes when needed .  Status: Continued - Date(s): 11/6/2023    Intervention(s)  Therapist will teach  CBT skills, affirmations, wise mind, window of tolerance, and use of checking the facts and reframing when needed .    Objective #C  Patient will identify 2 strategies for more effectively managing Bipolar Disorder - related to being able to reach out to supports - even for a positive distraction.  Status: Completed - Date(s): 11/6/2023    Intervention(s)  Therapist will assign homework  "related to reaching out to supports .      Goal 2: Patient will be able to better manage symptoms of trauma as they are triggered     I will know I've met my goal when I am not feeling as controlled by trauma symptoms - shame/guilt, still thinking about the trauma 3+ hours after it is triggered.      Objective #A (Patient Action)    Status: Completed - Date: 11/6/2023    Patient will  have a better understanding of how trauma affects the brain .    Intervention(s)  Therapist will teach psychoecuation on trauma and how it affects the brain .      Objective #B  Patient will  develop a set of coping skills related to feeling triggered by past trauma .    Status: Completed - Date: 11/6/2023    Intervention(s)  Therapist will provide educational materials on coping skills related to trauma - BLS , grounding skills .    Objective #C  Patient will understand and be able to express feelings of grief as they come.    Intervention(s)  Therapist will teach stage of grief and offer a safe space for patient to be able to express and identify these emotions.    Status: New - Date: 11/6/2023          Patient has reviewed and agreed to the above plan.      Swapna Houser, [facility]  August 7, 2023           [facility]                                        Ryanne Goff      SAFETY PLAN:  Step 1: Warning signs / cues (Thoughts, images, mood, situation, behavior) that a crisis may be developing:  Thoughts: \"I'm a burden\", \"I can't do this anymore\" and \"I just want this to end\"  Images: flashbacks and visions of harm: self injury  Thinking Processes: racing thoughts, intrusive thoughts (bothersome, unwanted thoughts that come out of nowhere): I don't want to do this, I can't do this and highly critical and negative thoughts: You're not capable, you don't deserve this life  Mood: worsening depression, hopelessness, helplessness, intense anger and agitation  Behaviors: can't stop crying, aggression, not " "taking care of myself, sleeping too much and not sleeping enough  Situations: anniversary of deaths of important people and trauma    Step 2: Coping strategies - Things I can do to take my mind off of my problems without contacting another person (relaxation technique, physical activity):  Distress Tolerance Strategies:  play with my pet , paced breathing/progressive muscle relaxation and watching a sad movie just allowing self to feel sad  Physical Activities: exercise: walking on walking on pad, deep breathing and stretching   Focus on helpful thoughts:  \"It always passes\" and remind myself of what is important to me: thinking of episodes of depression and how I felt at that time, then thinking of in between times and reminding myself I can do that again  Step 3: People and social settings that provide distraction:              Name: Sister Suma Drew    Phone: [phone]              Name: Garcia  Phone: Number in Phone              Name: David      Phone: Number in Phone  We will work on finding places as a goal    Step 4: Remind myself of people and things that are important to me and worth living for:  Sister, parents, friends, partner, pets     Step 5: When I am in crisis, I can ask these people to help me use my safety plan:              Name: Garcia  Phone: number in phone  Step 6: Making the environment safe:   remove things I could use to hurt myself: give objects to trusted person to dispose of  Step 7: Professionals or agencies I can contact during a crisis:   Local Crisis Services: The new Crisis line from any phone is 988, you can also text a message to this line.       Professionals or agencies I can contact during a crisis:   Suicide LifeLine Chat: suicidepreventionlifeline.org/chat  Suicide Prevention Lifeline: just dial 988  Crisis Text Line Service (available 24 hours a day, 7 days a week): Text [place] to 058216        COMMUNITY RESOURCES FOR MENTAL HEALTH EMERGENCIES:    [facility] " [phone]   COPE 24/7 [place] Mobile Team [phone] (adults) [phone] (child)  Poison Control Center [phone]    OR  go to nearest ER  [facility] ([facility]) [facility]  [phone]  [facility] (www.dio.org): 669.738.3026 or 199-947-9695.   Mental Health Association (www.mentalhealth.org): 849.293.8872 or 921-248-0612.  [facility]/resources for a list of additional resources (SOS)   [facility] [phone]   Urgent Care Adult Mental Health-[phone] mobile unit 24/7 crisis line  EMPATH UNIT [facility] - Address: [facility] [place] [address]; Phone: [phone]     Crisis Services By County: Phone Number:   [place]     [phone]   [place]  [phone]   [facility]  [phone]   [place]    [phone]   [place]/ COPE    [phone]   [place] [phone]   [place]    [phone]   [place]    [phone]   [place] [phone]   [place]     [phone]         Call 911 or go to my nearest emergency department.       I helped develop this safety plan and agree to use it when needed.  I have been given a copy of this plan.       Client signature __________virtual - client will acknowledge via [employer] they have received and agree to plan._______________________________________________________  Today s date:  7/14/2023  Completed by Provider Name/ Credentials:  RICKI Parker, LICSW                        July 14, 2023  Adapted from Safety Plan Template 2008 Vicky Paez and Kyle Rivers is reprinted with the express permission of the authors.  No portion of the Safety Plan Template may be reproduced without the express, written permission.  You can contact the authors at [email or web address] or [email or web address].              Answers for HPI/ROS submitted by the patient on 7/13/2023  If you checked off any problems, how difficult have these problems made it for you to do your work, take care  of things at home, or get along with other people?: Not difficult at all  PHQ9 TOTAL SCORE: 1    Answers submitted by the patient for this visit:  Patient Health Questionnaire (Submitted on 8/7/2023)  If you checked off any problems, how difficult have these problems made it for you to do your work, take care of things at home, or get along with other people?: Not difficult at all  PHQ9 TOTAL SCORE: 2  Answers submitted by the patient for this visit:  Patient Health Questionnaire (Submitted on 9/18/2023)  If you checked off any problems, how difficult have these problems made it for you to do your work, take care of things at home, or get along with other people?: Not difficult at all  PHQ9 TOTAL SCORE: 1  Answers submitted by the patient for this visit:  Patient Health Questionnaire (Submitted on 9/24/2023)  PHQ9 TOTAL SCORE: 2  Answers submitted by the patient for this visit:  Patient Health Questionnaire (Submitted on 11/6/2023)  If you checked off any problems, how difficult have these problems made it for you to do your work, take care of things at home, or get along with other people?: Not difficult at all  PHQ9 TOTAL SCORE: 1

## 2023-12-04 ENCOUNTER — VIRTUAL VISIT (OUTPATIENT)
Dept: PSYCHOLOGY | Facility: CLINIC | Age: 38
End: 2023-12-04
Payer: COMMERCIAL

## 2023-12-04 DIAGNOSIS — F41.1 GAD (GENERALIZED ANXIETY DISORDER): ICD-10-CM

## 2023-12-04 DIAGNOSIS — F43.10 PTSD (POST-TRAUMATIC STRESS DISORDER): Primary | ICD-10-CM

## 2023-12-04 DIAGNOSIS — F31.62 BIPOLAR 1 DISORDER, MIXED, MODERATE (H): ICD-10-CM

## 2023-12-04 PROCEDURE — 90834 PSYTX W PT 45 MINUTES: CPT | Mod: VID | Performed by: SOCIAL WORKER

## 2023-12-04 NOTE — PROGRESS NOTES
[facility]                                     Progress Note    Patient Name: Ryanne Goff  Date: 2023         Service Type: Individual      Session Start Time: 200  Session End Time: 240     Session Length: 38-52    Session #: 10    Attendees: Client attended alone    Service Modality:  Video Visit:      Provider verified identity through the following two step process.  Patient provided:  Patient photo and Patient     Telemedicine Visit: The patient's condition can be safely assessed and treated via synchronous audio and visual telemedicine encounter.      Reason for Telemedicine Visit: Patient has requested telehealth visit    Originating Site (Patient Location): Patient's home    Distant Site (Provider Location): Provider Remote Setting- Home Office    Consent:  The patient/guardian has verbally consented to: the potential risks and benefits of telemedicine (video visit) versus in person care; bill my insurance or make self-payment for services provided; and responsibility for payment of non-covered services.     Patient would like the video invitation sent by:  My Chart    Mode of Communication:  Video Conference via Amwell    Distant Location (Provider):  Off-site    As the provider I attest to compliance with applicable laws and regulations related to telemedicine.    DATA  Interactive Complexity: No  Crisis: No        Progress Since Last Session (Related to Symptoms / Goals / Homework):   Symptoms: Ryanne's partner has accepted a position in [place] and she will be eventually moving there.  This has caused a lot of stress and is approaching quickly.  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 She's stated she's nervous about what this move means for her mental health but wants to make sure she gives the move a chance for her relationship with her  partner.  She plans to reevaluate in 6 months how things are going, but knows she has a good support network and coping skills in the meantime.    Homework: Achieved / completed to satisfaction      Episode of Care Goals: Satisfactory progress - PREPARATION (Decided to change - considering how); Intervened by negotiating a change plan and determining options / strategies for behavior change, identifying triggers, exploring social supports, and working towards setting a date to begin behavior change     Current / Ongoing Stressors and Concerns:   Starting a new job and really enjoying it, but feeling some imposter syndrome.     Treatment Objective(s) Addressed in This Session:   Treatment planning  Trauma - coping skills     Intervention:   Treatment planning     Psychoeducation on the nervous system and panic.  Gave suggestion of additional coping skills to try BLS play list and how to incorporate this into tool box of coping skills.  Provided positive feedback, validation and empathic listening.       Allowed a safe space for Ryanne to discuss and navigate upcoming life changes.  Encouraged use of identifying emotions and describing them.  Praised Ryanne for her ability to focus on what she can do instead of not do in hard situations and reviewed ways in which she has been able to check the facts and stick with reality.  Explored options for transitioning therapists during the move and discussed upcoming ZARINA, also discussed options of staying with this provider.     CBT skills introduced - catch it check it change it, CBT thought log.  Demonstrated skills with issue that Ryanne is experiencing at work.  Answered questions and encouraged use of positive reframing skills.     Solutions focused work - focused on upcoming move. Supported client in her processing of emotions and big changes.  Used CBT skills to help reframe when appropriate.  Reviewed safety concerns - reporting none at the present and is able to keep self  safe using safety plan.  Reviewed use of CBT skills outside of therapy and as well as BLS.    Reviewed safety with no concerns, updated treatment plan, psychoeducation on stages of grief and how it relates to big life changes Ryanne is experiencing.  Encouraged and offered safe space to further discuss and process difficult emotions.    CURRENT:     Continued to work on maintenance of helpful coping skills and behaviors.  Explored evolving situation regarding partners new job, and processed associated emotions.  Spent time teaching sleep hygiene and practicing new coping skills related to sleep and waking in the middle of the night.  Practiced using pressure points and areas of the body where there are more nerve endings to use as calming strategies (massage, and light pressure).  Also discussed the rule of 5's and practiced the stop sign thought stopping technique together.      Assessments completed prior to visit:  The following assessments were completed by patient for this visit:  PHQ2:       5/5/2022     9:30 AM 5/4/2022     9:06 AM   PHQ-2 ( 1999 [employer])   Q1: Little interest or pleasure in doing things 1 1   Q2: Feeling down, depressed or hopeless 1 1   PHQ-2 Score 2 2   Q1: Little interest or pleasure in doing things  Several days   Q2: Feeling down, depressed or hopeless  Several days   PHQ-2 Score  2     PHQ9:       6/12/2023    10:02 AM 7/6/2023     5:32 PM 7/13/2023    10:00 AM 8/7/2023     8:30 AM 9/18/2023     9:37 AM 9/24/2023     5:39 PM 11/6/2023     8:31 AM   PHQ-9 SCORE   PHQ-9 Total Score MyChart 8 (Mild depression) 1 (Minimal depression) 1 (Minimal depression) 2 (Minimal depression) 1 (Minimal depression) 2 (Minimal depression) 1 (Minimal depression)   PHQ-9 Total Score 8 1 1 2 1 2 1     GAD2:       5/4/2023     1:25 PM 6/6/2023     2:26 PM 8/30/2023    12:31 PM 9/24/2023     5:39 PM 10/13/2023    11:45 AM 12/4/2023    11:17 AM   ANU-2   Feeling nervous, anxious, or on edge 3    3 2 1 1 1 1   Not  being able to stop or control worrying 2    2 2 0 1 1 0   ANU-2 Total Score 5    5 4 1 2 2 1     GAD7:       5/5/2022     9:30 AM 2/10/2023     1:52 PM 3/15/2023     4:59 PM 4/12/2023     4:28 PM 5/4/2023     1:25 PM 6/6/2023     2:26 PM   ANU-7 SCORE   Total Score  14 (moderate anxiety) 19 (severe anxiety) 11 (moderate anxiety) 14 (moderate anxiety) 10 (moderate anxiety)   Total Score 9 14 19 11 14    14 10     CAGE-AID:       2/10/2023     2:16 PM 5/9/2023    10:00 AM   CAGE-AID Total Score   Total Score     Total Score MyChart         Information is confidential and restricted. Go to Review Flowsheets to unlock data.     PROMIS 10-Global Health (all questions and answers displayed):       2/10/2023     2:16 PM 5/8/2023    12:43 PM 6/2/2023    11:49 AM 6/6/2023     2:27 PM 9/18/2023     9:37 AM   PROMIS 10   In general, would you say your health is:    Good Good   In general, would you say your quality of life is:    Good Very good   In general, how would you rate your physical health?    Good Good   In general, how would you rate your mental health, including your mood and your ability to think?    Poor Good   In general, how would you rate your satisfaction with your social activities and relationships?    Fair Very good   In general, please rate how well you carry out your usual social activities and roles    Poor Good   To what extent are you able to carry out your everyday physical activities such as walking, climbing stairs, carrying groceries, or moving a chair?    Mostly Completely   In the past 7 days, how often have you been bothered by emotional problems such as feeling anxious, depressed, or irritable?    Sometimes Rarely   In the past 7 days, how would you rate your fatigue on average?    Mild None   In the past 7 days, how would you rate your pain on average, where 0 means no pain, and 10 means worst imaginable pain?    0 0   In general, would you say your health is:    3 3    3   In general, would  you say your quality of life is:    3 4    4   In general, how would you rate your physical health?    3 3    3   In general, how would you rate your mental health, including your mood and your ability to think?    1 3    3   In general, how would you rate your satisfaction with your social activities and relationships?    2 4    4   In general, please rate how well you carry out your usual social activities and roles. (This includes activities at home, at work and in your community, and responsibilities as a parent, child, spouse, employee, friend, etc.)    1 3    3   To what extent are you able to carry out your everyday physical activities such as walking, climbing stairs, carrying groceries, or moving a chair?    4 5    5   In the past 7 days, how often have you been bothered by emotional problems such as feeling anxious, depressed, or irritable?    3 2    2   In the past 7 days, how would you rate your fatigue on average?    2 1    1   In the past 7 days, how would you rate your pain on average, where 0 means no pain, and 10 means worst imaginable pain?    0 0    0   Global Mental Health Score    9 15    15   Global Physical Health Score    16 18    18   PROMIS TOTAL - SUBSCORES    25 33    33       Information is confidential and restricted. Go to Review Flowsheets to unlock data.    Multiple values from one day are sorted in reverse-chronological order     PROMIS 10-Global Health (only subscores and total score):       2/10/2023     2:16 PM 5/8/2023    12:43 PM 6/2/2023    11:49 AM 6/6/2023     2:27 PM 9/18/2023     9:37 AM   PROMIS-10 Scores Only   Global Mental Health Score    9 15    15   Global Physical Health Score    16 18    18   PROMIS TOTAL - SUBSCORES    25 33    33       Information is confidential and restricted. Go to Review Flowsheets to unlock data.    Multiple values from one day are sorted in reverse-chronological order     [place] Suicide Severity Rating Scale (Lifetime/Recent)       "5/9/2023    10:00 AM   [place] Suicide Severity Rating (Lifetime/Recent)   Q1 Wish to be Dead (Lifetime) Yes   Comments age 11-disapointing mom   Q2 Non-Specific Active Suicidal Thoughts (Lifetime) No   Most Severe Ideation Rating (Lifetime) 2   Most Severe Ideation Description (Lifetime) SA age 19-cutting, drinking and took vicodin plus    Frequency (Lifetime) 1   Duration (Lifetime) 1   Controllability (Lifetime) 2   Protective Factors  (Lifetime) 1   Reasons for Ideation (Lifetime) 0   Q1 Wished to be Dead (Past Month) yes   Q2 Suicidal Thoughts (Past Month) yes   Q3 Suicidal Thought Method yes   Q4 Suicidal Intent without Specific Plan no   Q5 Suicide Intent with Specific Plan no   Q6 Suicide Behavior (Lifetime) yes   Within the Past 3 Months? no   Level of Risk per Screen moderate risk     [place] Suicide Severity Rating Scale (Short Version)      5/9/2023    10:00 AM 5/30/2023    11:00 AM   [place] Suicide Severity Rating (Short Version)   Q1 Wished to be Dead (Past Month) yes    Q2 Suicidal Thoughts (Past Month) yes    Q3 Suicidal Thought Method yes    Q4 Suicidal Intent without Specific Plan no    Q5 Suicide Intent with Specific Plan no    Q6 Suicide Behavior (Lifetime) yes    Within the Past 3 Months? no    Level of Risk per Screen moderate risk    1. Wish to be Dead (Since Last Contact)  Y   Wish to be Dead Description (Since Last Contact)  Some days \"I want to be unalive\"   2. Non-Specific Active Suicidal Thoughts (Since Last Contact)  N   Actual Attempt (Since Last Contact)  N   Has subject engaged in non-suicidal self-injurious behavior? (Since Last Contact)  N   Interrupted Attempts (Since Last Contact)  N   Aborted or Self-Interrupted Attempt (Since Last Contact)  N   Preparatory Acts or Behavior (Since Last Contact)  N   Suicide (Since Last Contact)  N   Calculated C-SSRS Risk Score (Since Last Contact)  Low Risk         ASSESSMENT: Current Emotional / Mental Status (status of " significant symptoms):   Risk status (Self / Other harm or suicidal ideation)   Patient denies current fears or concerns for personal safety.   Patient reports the following current or recent suicidal ideation or behaviors: past SI - safety plan.   Patient denies current or recent homicidal ideation or behaviors.   Patient denies current or recent self injurious behavior or ideation.   Patient denies other safety concerns.   Patient reports there has been no change in risk factors since their last session.     Patient reports there has been no change in protective factors since their last session.     A safety and risk management plan has been developed including: Patient consented to co-developed safety plan on previous visit.  Safety and risk management plan was reviewed.   Patient agreed to use safety plan should any safety concerns arise.  A copy was made available to the patient.     Appearance:   Appropriate    Eye Contact:   Good    Psychomotor Behavior: Normal    Attitude:   Cooperative  Friendly Pleasant   Orientation:   All   Speech    Rate / Production: Normal/ Responsive Normal     Volume:  Normal    Mood:    Normal   Affect:    Appropriate    Thought Content:  Clear    Thought Form:  Coherent  Logical    Insight:    Good      Medication Review:  No changes to current psychiatric medication(s)  sees Dr. Mabel Amos.     Medication Compliance:   Yes     Changes in Health Issues:   None reported     Chemical Use Review:   Substance Use: Chemical use reviewed, no active concerns identified      Tobacco Use: No current tobacco use.      Diagnosis:  PTSD  Bipolar 1 Mixed Moderate  ANU      Collateral Reports Completed:   Not Applicable    PLAN: (Patient Tasks / Therapist Tasks / Other)  Try grounding coping skills + add in BLS playlist  Use CBT skills     Work on using pressure points and area of arm where there are more nerve endings to rub/massage when she is having a hard time falling back to  sleep    Try rule of fives coping skill along with stop sign coping skill.      Check in in 2-3 weeks.        Swapna Houser, LICSW                                                         ______________________________________________________________________    Individual Treatment Plan    Patient's Name: Ryanne Goff  YOB: 1985    Date of Creation: 8/7/2023    Date Treatment Plan Last Reviewed/Revised: 8/7/2023    DSM5 Diagnoses: 296.52 Bipolar I Disorder Current or Most Recent Episode Depressed, Moderate, 300.02 (F41.1) Generalized Anxiety Disorder, or 309.81 (F43.10) Posttraumatic Stress Disorder (includes Posttraumatic Stress Disorder for Children 6 Years and Younger)  With dissociative symptoms  Psychosocial / Contextual Factors: Ryanne is a 37 year old  American woman who is in a stable supportive relationship and working full time.  PROMIS (reviewed every 90 days): PROMIS-10 Scores        6/2/2023    11:49 AM 6/6/2023     2:27 PM 9/18/2023     9:37 AM   PROMIS-10 Total Score w/o Sub Scores   PROMIS TOTAL - SUBSCORES  25 33    33       Information is confidential and restricted. Go to Review Flowsheets to unlock data.    Multiple values from one day are sorted in reverse-chronological order        Referral / Collaboration:  Referral to another professional/service is not indicated at this time..    Anticipated number of session for this episode of care:  27-36   Anticipation frequency of session: Weekly  Anticipated Duration of each session: 38-52 minutes  Treatment plan will be reviewed in 90 days or when goals have been changed.       MeasurableTreatment Goal(s) related to diagnosis / functional impairment(s)  Goal 1: Patient will feel like she is better able to manage the highs and lows of depression and amber; and will use safety plan every time suicidal thoughts come up.      I will know I've met my goal when I am not dwelling as much on the past, but more able to self  reflect.        Objective #A (Patient Action)  Patient will use safety plan every time suicidal ideation occurs.    Intervention(s)  Create and use a safety plan, utilize crisis supports  Status: Continued - Date(s): 11/6/2023    Objective #B (Patient Action)    Patient will  work on using positive self talk and use of reframes when needed .  Status: Continued - Date(s): 11/6/2023    Intervention(s)  Therapist will teach  CBT skills, affirmations, wise mind, window of tolerance, and use of checking the facts and reframing when needed .    Objective #C  Patient will identify 2 strategies for more effectively managing Bipolar Disorder - related to being able to reach out to supports - even for a positive distraction.  Status: Completed - Date(s): 11/6/2023    Intervention(s)  Therapist will assign homework related to reaching out to supports .      Goal 2: Patient will be able to better manage symptoms of trauma as they are triggered     I will know I've met my goal when I am not feeling as controlled by trauma symptoms - shame/guilt, still thinking about the trauma 3+ hours after it is triggered.      Objective #A (Patient Action)    Status: Completed - Date: 11/6/2023    Patient will  have a better understanding of how trauma affects the brain .    Intervention(s)  Therapist will teach psychoecuation on trauma and how it affects the brain .      Objective #B  Patient will  develop a set of coping skills related to feeling triggered by past trauma .    Status: Completed - Date: 11/6/2023    Intervention(s)  Therapist will provide educational materials on coping skills related to trauma - BLS , grounding skills .    Objective #C  Patient will understand and be able to express feelings of grief as they come.    Intervention(s)  Therapist will teach stage of grief and offer a safe space for patient to be able to express and identify these emotions.    Status: New - Date: 11/6/2023          Patient has reviewed and  "agreed to the above plan.      Swapna CARTER Houser, [facility]  August 7, 2023           [facility]                                        Ryanne Goff      SAFETY PLAN:  Step 1: Warning signs / cues (Thoughts, images, mood, situation, behavior) that a crisis may be developing:  Thoughts: \"I'm a burden\", \"I can't do this anymore\" and \"I just want this to end\"  Images: flashbacks and visions of harm: self injury  Thinking Processes: racing thoughts, intrusive thoughts (bothersome, unwanted thoughts that come out of nowhere): I don't want to do this, I can't do this and highly critical and negative thoughts: You're not capable, you don't deserve this life  Mood: worsening depression, hopelessness, helplessness, intense anger and agitation  Behaviors: can't stop crying, aggression, not taking care of myself, sleeping too much and not sleeping enough  Situations: anniversary of deaths of important people and trauma    Step 2: Coping strategies - Things I can do to take my mind off of my problems without contacting another person (relaxation technique, physical activity):  Distress Tolerance Strategies:  play with my pet , paced breathing/progressive muscle relaxation and watching a sad movie just allowing self to feel sad  Physical Activities: exercise: walking on walking on pad, deep breathing and stretching   Focus on helpful thoughts:  \"It always passes\" and remind myself of what is important to me: thinking of episodes of depression and how I felt at that time, then thinking of in between times and reminding myself I can do that again  Step 3: People and social settings that provide distraction:              Name: Sister Suma Drew    Phone: [phone]              Name: Garcia  Phone: Number in Phone              Name: David      Phone: Number in Phone  We will work on finding places as a goal    Step 4: Remind myself of people and things that are important to me and worth living for:  Sister, " parents, friends, partner, pets     Step 5: When I am in crisis, I can ask these people to help me use my safety plan:              Name: Garcia  Phone: number in phone  Step 6: Making the environment safe:   remove things I could use to hurt myself: give objects to trusted person to dispose of  Step 7: Professionals or agencies I can contact during a crisis:   Local Crisis Services: The new Crisis line from any phone is 988, you can also text a message to this line.       Professionals or agencies I can contact during a crisis:   Suicide LifeLine Chat: suicidepreventionlifeline.org/chat  Suicide Prevention Lifeline: just dial 988  Crisis Text Line Service (available 24 hours a day, 7 days a week): Text [place] to 763831        COMMUNITY RESOURCES FOR MENTAL HEALTH EMERGENCIES:    [facility] [phone]   COPE 24/7 [place] Mobile Team [phone] (adults) [phone] (child)  Poison Control Center [phone]    OR  go to nearest ER  [facility] ([facility]) [facility]  [phone]  National [place] on Mental Illness (www.dio.org): 896.375.8436 or 051-190-2948.   Mental Health Association (www.mentalhealth.org): 795.613.2295 or 457-088-0743.  Quarterly/resources for a list of additional resources (SOS)   [facility] [phone]   Urgent Care Adult Mental Health-[phone] mobile unit 24/7 crisis line  [facility] - Address: [facility] [place] [address]; Phone: [phone]     Crisis Services By County: Phone Number:   [place]     [phone]   [place]  [phone]   [facility]  [phone]   [place]    [phone]   [place]/ [place]    [phone]   [place] [phone]   [place]    [phone]   [place]    [phone]   [place] [phone]   [place]     [phone]         Call 911 or go to my nearest emergency department.       I helped develop this safety plan and agree to use it when needed.  I have been given a copy of  this plan.       Client signature __________virtual - client will acknowledge via [employer] they have received and agree to plan._______________________________________________________  Today s date:  7/14/2023  Completed by Provider Name/ Credentials:  Swapna Houser, RICKI, LICSW                        July 14, 2023  Adapted from Safety Plan Template 2008 Vicky Paez and Kyle Rivers is reprinted with the express permission of the authors.  No portion of the Safety Plan Template may be reproduced without the express, written permission.  You can contact the authors at bhs@[facility] or jacqueline@mail.[facility].              Answers for HPI/ROS submitted by the patient on 7/13/2023  If you checked off any problems, how difficult have these problems made it for you to do your work, take care of things at home, or get along with other people?: Not difficult at all  PHQ9 TOTAL SCORE: 1    Answers submitted by the patient for this visit:  Patient Health Questionnaire (Submitted on 8/7/2023)  If you checked off any problems, how difficult have these problems made it for you to do your work, take care of things at home, or get along with other people?: Not difficult at all  PHQ9 TOTAL SCORE: 2  Answers submitted by the patient for this visit:  Patient Health Questionnaire (Submitted on 9/18/2023)  If you checked off any problems, how difficult have these problems made it for you to do your work, take care of things at home, or get along with other people?: Not difficult at all  PHQ9 TOTAL SCORE: 1  Answers submitted by the patient for this visit:  Patient Health Questionnaire (Submitted on 9/24/2023)  PHQ9 TOTAL SCORE: 2  Answers submitted by the patient for this visit:  Patient Health Questionnaire (Submitted on 11/6/2023)  If you checked off any problems, how difficult have these problems made it for you to do your work, take care of things at home, or get along with other people?: Not difficult at  all  PHQ9 TOTAL SCORE: 1

## 2024-01-02 ENCOUNTER — VIRTUAL VISIT (OUTPATIENT)
Dept: PSYCHOLOGY | Facility: CLINIC | Age: 39
End: 2024-01-02
Payer: COMMERCIAL

## 2024-01-02 DIAGNOSIS — F43.10 PTSD (POST-TRAUMATIC STRESS DISORDER): Primary | ICD-10-CM

## 2024-01-02 DIAGNOSIS — F41.1 GAD (GENERALIZED ANXIETY DISORDER): ICD-10-CM

## 2024-01-02 DIAGNOSIS — F31.62 BIPOLAR 1 DISORDER, MIXED, MODERATE (H): ICD-10-CM

## 2024-01-02 PROCEDURE — 90834 PSYTX W PT 45 MINUTES: CPT | Mod: 95 | Performed by: SOCIAL WORKER

## 2024-01-02 ASSESSMENT — PATIENT HEALTH QUESTIONNAIRE - PHQ9
SUM OF ALL RESPONSES TO PHQ QUESTIONS 1-9: 1
10. IF YOU CHECKED OFF ANY PROBLEMS, HOW DIFFICULT HAVE THESE PROBLEMS MADE IT FOR YOU TO DO YOUR WORK, TAKE CARE OF THINGS AT HOME, OR GET ALONG WITH OTHER PEOPLE: NOT DIFFICULT AT ALL
SUM OF ALL RESPONSES TO PHQ QUESTIONS 1-9: 1

## 2024-01-02 NOTE — PROGRESS NOTES
[facility]                                     Progress Note    Patient Name: Ryanne Goff  Date: 2024         Service Type: Individual      Session Start Time: 1100  Session End Time: 1140     Session Length: 38-52    Session #: 11    Attendees: Client attended alone    Service Modality:  Video Visit:      Provider verified identity through the following two step process.  Patient provided:  Patient photo and Patient     Telemedicine Visit: The patient's condition can be safely assessed and treated via synchronous audio and visual telemedicine encounter.      Reason for Telemedicine Visit: Patient has requested telehealth visit    Originating Site (Patient Location): Patient's home    Distant Site (Provider Location): Provider Remote Setting- Home Office    Consent:  The patient/guardian has verbally consented to: the potential risks and benefits of telemedicine (video visit) versus in person care; bill my insurance or make self-payment for services provided; and responsibility for payment of non-covered services.     Patient would like the video invitation sent by:  My Chart    Mode of Communication:  Video Conference via Amwell    Distant Location (Provider):  Off-site    As the provider I attest to compliance with applicable laws and regulations related to telemedicine.    DATA  Interactive Complexity: No  Crisis: No        Progress Since Last Session (Related to Symptoms / Goals / Homework):   Symptoms: Ryanne's partner has accepted a position in [place] and she will be eventually moving there.  This has caused a lot of stress and is approaching quickly.  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 She's stated she's nervous about what this move means for her mental health but wants to make sure she gives the move a chance for her relationship with her  partner.  She plans to reevaluate in 6 months how things are going, but knows she has a good support network and coping skills in the meantime.  Ryanne and partner have recently started talking about having children which is both exciting and overwhelming for them per patient's report.  Ryanne discussed catastrophizing and losing sleep over all the decisions that will need to be made, and ways to make this work.  She has used coping skills that work to some extent, and is realizing how her thoughts are taking form and trying to manage with CBT skills.    Homework: Achieved / completed to satisfaction      Episode of Care Goals: Satisfactory progress - Action (Using changing plan to make reasonable behavioral changes and is noticing results).     Current / Ongoing Stressors and Concerns:   Starting a new job and really enjoying it, but feeling some imposter syndrome.   Partner accepting new job and moving to [place].   Thinking about having children.     Treatment Objective(s) Addressed in This Session:   Trauma - coping skills     Intervention:   Treatment planning     Psychoeducation on the nervous system and panic.  Gave suggestion of additional coping skills to try BLS play list and how to incorporate this into tool box of coping skills.  Provided positive feedback, validation and empathic listening.       Allowed a safe space for Ryanne to discuss and navigate upcoming life changes.  Encouraged use of identifying emotions and describing them.  Praised Ryanne for her ability to focus on what she can do instead of not do in hard situations and reviewed ways in which she has been able to check the facts and stick with reality.  Explored options for transitioning therapists during the move and discussed upcoming ZARINA, also discussed options of staying with this provider.     CBT skills introduced - catch it check it change it, CBT thought log.  Demonstrated skills with issue that Ryanne is experiencing at work.  Answered questions  and encouraged use of positive reframing skills.     Solutions focused work - focused on upcoming move. Supported client in her processing of emotions and big changes.  Used CBT skills to help reframe when appropriate.  Reviewed safety concerns - reporting none at the present and is able to keep self safe using safety plan.  Reviewed use of CBT skills outside of therapy and as well as BLS.    Reviewed safety with no concerns, updated treatment plan, psychoeducation on stages of grief and how it relates to big life changes Ryanne is experiencing.  Encouraged and offered safe space to further discuss and process difficult emotions.     Continued to work on maintenance of helpful coping skills and behaviors.  Explored evolving situation regarding partners new job, and processed associated emotions.  Spent time teaching sleep hygiene and practicing new coping skills related to sleep and waking in the middle of the night.  Practiced using pressure points and areas of the body where there are more nerve endings to use as calming strategies (massage, and light pressure).  Also discussed the rule of 5's and practiced the stop sign thought stopping technique together.    CURRENT:     Asked open ended questions to gauge the client's thought processes, values, and present situation.  Utilized CBT skills for catastrophic thinking and validated patient's emotions.  Demonstrated and practiced the container coping skills to help compartmentalize when needed and return to stressful thoughts/materials when better feeling equipped/or with more support.      Assessments completed prior to visit:  The following assessments were completed by patient for this visit:  PHQ2:       5/5/2022     9:30 AM 5/4/2022     9:06 AM   PHQ-2 ( 1999 [employer])   Q1: Little interest or pleasure in doing things 1 1   Q2: Feeling down, depressed or hopeless 1 1   PHQ-2 Score 2 2   Q1: Little interest or pleasure in doing things  Several days   Q2: Feeling  down, depressed or hopeless  Several days   PHQ-2 Score  2     PHQ9:       7/6/2023     5:32 PM 7/13/2023    10:00 AM 8/7/2023     8:30 AM 9/18/2023     9:37 AM 9/24/2023     5:39 PM 11/6/2023     8:31 AM 1/2/2024    10:10 AM   PHQ-9 SCORE   PHQ-9 Total Score MyChart 1 (Minimal depression) 1 (Minimal depression) 2 (Minimal depression) 1 (Minimal depression) 2 (Minimal depression) 1 (Minimal depression) 1 (Minimal depression)   PHQ-9 Total Score 1 1 2 1 2 1 1     GAD2:       5/4/2023     1:25 PM 6/6/2023     2:26 PM 8/30/2023    12:31 PM 9/24/2023     5:39 PM 10/13/2023    11:45 AM 12/4/2023    11:17 AM 1/2/2024    10:09 AM   ANU-2   Feeling nervous, anxious, or on edge 3 2 1 1 1 1 1   Not being able to stop or control worrying 2 2 0 1 1 0 0   ANU-2 Total Score 5    5 4 1 2 2 1 1     GAD7:       5/5/2022     9:30 AM 2/10/2023     1:52 PM 3/15/2023     4:59 PM 4/12/2023     4:28 PM 5/4/2023     1:25 PM 6/6/2023     2:26 PM   ANU-7 SCORE   Total Score  14 (moderate anxiety) 19 (severe anxiety) 11 (moderate anxiety) 14 (moderate anxiety) 10 (moderate anxiety)   Total Score 9 14 19 11 14    14 10     CAGE-AID:       2/10/2023     2:16 PM 5/9/2023    10:00 AM   CAGE-AID Total Score   Total Score     Total Score MyChart         Information is confidential and restricted. Go to Review Flowsheets to unlock data.     PROMIS 10-Global Health (all questions and answers displayed):       2/10/2023     2:16 PM 5/8/2023    12:43 PM 6/2/2023    11:49 AM 6/6/2023     2:27 PM 9/18/2023     9:37 AM 1/2/2024    10:10 AM   PROMIS 10   In general, would you say your health is:    Good Good Very good   In general, would you say your quality of life is:    Good Very good Very good   In general, how would you rate your physical health?    Good Good Very good   In general, how would you rate your mental health, including your mood and your ability to think?    Poor Good Very good   In general, how would you rate your satisfaction with your  social activities and relationships?    Fair Very good Very good   In general, please rate how well you carry out your usual social activities and roles    Poor Good Very good   To what extent are you able to carry out your everyday physical activities such as walking, climbing stairs, carrying groceries, or moving a chair?    Mostly Completely Completely   In the past 7 days, how often have you been bothered by emotional problems such as feeling anxious, depressed, or irritable?    Sometimes Rarely Sometimes   In the past 7 days, how would you rate your fatigue on average?    Mild None None   In the past 7 days, how would you rate your pain on average, where 0 means no pain, and 10 means worst imaginable pain?    0 0 0   In general, would you say your health is:    3 3 4   In general, would you say your quality of life is:    3 4 4   In general, how would you rate your physical health?    3 3 4   In general, how would you rate your mental health, including your mood and your ability to think?    1 3 4   In general, how would you rate your satisfaction with your social activities and relationships?    2 4 4   In general, please rate how well you carry out your usual social activities and roles. (This includes activities at home, at work and in your community, and responsibilities as a parent, child, spouse, employee, friend, etc.)    1 3 4   To what extent are you able to carry out your everyday physical activities such as walking, climbing stairs, carrying groceries, or moving a chair?    4 5 5   In the past 7 days, how often have you been bothered by emotional problems such as feeling anxious, depressed, or irritable?    3 2 3   In the past 7 days, how would you rate your fatigue on average?    2 1 1   In the past 7 days, how would you rate your pain on average, where 0 means no pain, and 10 means worst imaginable pain?    0 0 0   Global Mental Health Score    9 15    15 15   Global Physical Health Score    16 18     18 19   PROMIS TOTAL - SUBSCORES    25 33    33 34       Information is confidential and restricted. Go to Review Flowsheets to unlock data.    Multiple values from one day are sorted in reverse-chronological order     PROMIS 10-Global Health (only subscores and total score):       2/10/2023     2:16 PM 5/8/2023    12:43 PM 6/2/2023    11:49 AM 6/6/2023     2:27 PM 9/18/2023     9:37 AM 1/2/2024    10:10 AM   PROMIS-10 Scores Only   Global Mental Health Score    9 15    15 15   Global Physical Health Score    16 18    18 19   PROMIS TOTAL - SUBSCORES    25 33    33 34       Information is confidential and restricted. Go to Review Flowsheets to unlock data.    Multiple values from one day are sorted in reverse-chronological order     [place] Suicide Severity Rating Scale (Lifetime/Recent)      5/9/2023    10:00 AM   [place] Suicide Severity Rating (Lifetime/Recent)   Q1 Wish to be Dead (Lifetime) Yes   Comments age 11-disapointing mom   Q2 Non-Specific Active Suicidal Thoughts (Lifetime) No   Most Severe Ideation Rating (Lifetime) 2   Most Severe Ideation Description (Lifetime) SA age 19-cutting, drinking and took vicodin plus    Frequency (Lifetime) 1   Duration (Lifetime) 1   Controllability (Lifetime) 2   Protective Factors  (Lifetime) 1   Reasons for Ideation (Lifetime) 0   Q1 Wished to be Dead (Past Month) yes   Q2 Suicidal Thoughts (Past Month) yes   Q3 Suicidal Thought Method yes   Q4 Suicidal Intent without Specific Plan no   Q5 Suicide Intent with Specific Plan no   Q6 Suicide Behavior (Lifetime) yes   Within the Past 3 Months? no   Level of Risk per Screen moderate risk     [place] Suicide Severity Rating Scale (Short Version)      5/9/2023    10:00 AM 5/30/2023    11:00 AM   [place] Suicide Severity Rating (Short Version)   Q1 Wished to be Dead (Past Month) yes    Q2 Suicidal Thoughts (Past Month) yes    Q3 Suicidal Thought Method yes    Q4 Suicidal Intent without Specific Plan no    Q5  "Suicide Intent with Specific Plan no    Q6 Suicide Behavior (Lifetime) yes    Within the Past 3 Months? no    Level of Risk per Screen moderate risk    1. Wish to be Dead (Since Last Contact)  Y   Wish to be Dead Description (Since Last Contact)  Some days \"I want to be unalive\"   2. Non-Specific Active Suicidal Thoughts (Since Last Contact)  N   Actual Attempt (Since Last Contact)  N   Has subject engaged in non-suicidal self-injurious behavior? (Since Last Contact)  N   Interrupted Attempts (Since Last Contact)  N   Aborted or Self-Interrupted Attempt (Since Last Contact)  N   Preparatory Acts or Behavior (Since Last Contact)  N   Suicide (Since Last Contact)  N   Calculated C-SSRS Risk Score (Since Last Contact)  Low Risk         ASSESSMENT: Current Emotional / Mental Status (status of significant symptoms):   Risk status (Self / Other harm or suicidal ideation)   Patient denies current fears or concerns for personal safety.   Patient reports the following current or recent suicidal ideation or behaviors: past SI - safety plan.   Patient denies current or recent homicidal ideation or behaviors.   Patient denies current or recent self injurious behavior or ideation.   Patient denies other safety concerns.   Patient reports there has been no change in risk factors since their last session.     Patient reports there has been no change in protective factors since their last session.     A safety and risk management plan has been developed including: Patient consented to co-developed safety plan on previous visit.  Safety and risk management plan was reviewed.   Patient agreed to use safety plan should any safety concerns arise.  A copy was made available to the patient.     Appearance:   Appropriate    Eye Contact:   Good    Psychomotor Behavior: Normal    Attitude:   Cooperative  Friendly Pleasant   Orientation:   All   Speech    Rate / Production: Normal/ Responsive Normal     Volume:  Normal "    Mood:    Normal   Affect:    Appropriate    Thought Content:  Clear    Thought Form:  Coherent  Logical    Insight:    Good      Medication Review:  No changes to current psychiatric medication(s)  sees Dr. Mabel Amos.     Medication Compliance:   Yes     Changes in Health Issues:   None reported     Chemical Use Review:   Substance Use: Chemical use reviewed, no active concerns identified      Tobacco Use: No current tobacco use.      Diagnosis:  PTSD  Bipolar 1 Mixed Moderate  ANU      Collateral Reports Completed:   Not Applicable    PLAN: (Patient Tasks / Therapist Tasks / Other)  Try grounding coping skills + add in BLS playlist  Use CBT skills     Work on using pressure points and area of arm where there are more nerve endings to rub/massage when she is having a hard time falling back to sleep    Try rule of fives coping skill along with stop sign coping skill and container coping skill.      Check in in 2-3 weeks.        Swapna Houser, [facility]                                                         ______________________________________________________________________    Individual Treatment Plan    Patient's Name: Ryanne Goff  YOB: 1985    Date of Creation: 8/7/2023    Date Treatment Plan Last Reviewed/Revised: 8/7/2023, 11/6/2023    DSM5 Diagnoses: 296.52 Bipolar I Disorder Current or Most Recent Episode Depressed, Moderate, 300.02 (F41.1) Generalized Anxiety Disorder, or 309.81 (F43.10) Posttraumatic Stress Disorder (includes Posttraumatic Stress Disorder for Children 6 Years and Younger)  With dissociative symptoms  Psychosocial / Contextual Factors: Ryanne is a 37 year old  American woman who is in a stable supportive relationship and working full time.  PROMIS (reviewed every 90 days): PROMIS-10 Scores        6/6/2023     2:27 PM 9/18/2023     9:37 AM 1/2/2024    10:10 AM   PROMIS-10 Total Score w/o Sub Scores   PROMIS TOTAL - SUBSCORES 25 33    33 34         Referral / Collaboration:  Referral to another professional/service is not indicated at this time..    Anticipated number of session for this episode of care:  27-36   Anticipation frequency of session: Weekly  Anticipated Duration of each session: 38-52 minutes  Treatment plan will be reviewed in 90 days or when goals have been changed.       MeasurableTreatment Goal(s) related to diagnosis / functional impairment(s)  Goal 1: Patient will feel like she is better able to manage the highs and lows of depression and amber; and will use safety plan every time suicidal thoughts come up.      I will know I've met my goal when I am not dwelling as much on the past, but more able to self reflect.        Objective #A (Patient Action)  Patient will use safety plan every time suicidal ideation occurs.    Intervention(s)  Create and use a safety plan, utilize crisis supports  Status: Continued - Date(s): 11/6/2023    Objective #B (Patient Action)    Patient will  work on using positive self talk and use of reframes when needed .  Status: Continued - Date(s): 11/6/2023    Intervention(s)  Therapist will teach  CBT skills, affirmations, wise mind, window of tolerance, and use of checking the facts and reframing when needed .    Objective #C  Patient will identify 2 strategies for more effectively managing Bipolar Disorder - related to being able to reach out to supports - even for a positive distraction.  Status: Completed - Date(s): 11/6/2023    Intervention(s)  Therapist will assign homework related to reaching out to supports .      Goal 2: Patient will be able to better manage symptoms of trauma as they are triggered     I will know I've met my goal when I am not feeling as controlled by trauma symptoms - shame/guilt, still thinking about the trauma 3+ hours after it is triggered.      Objective #A (Patient Action)    Status: Completed - Date: 11/6/2023    Patient will  have a better understanding of how trauma affects  "the brain .    Intervention(s)  Therapist will teach psychoecuation on trauma and how it affects the brain .      Objective #B  Patient will  develop a set of coping skills related to feeling triggered by past trauma .    Status: Completed - Date: 11/6/2023    Intervention(s)  Therapist will provide educational materials on coping skills related to trauma - BLS , grounding skills .    Objective #C  Patient will understand and be able to express feelings of grief as they come.    Intervention(s)  Therapist will teach stage of grief and offer a safe space for patient to be able to express and identify these emotions.    Status: New - Date: 11/6/2023          Patient has reviewed and agreed to the above plan.      Swapna Houser, [facility]  August 7, 2023           [facility]                                        Ryanne Goff      SAFETY PLAN:  Step 1: Warning signs / cues (Thoughts, images, mood, situation, behavior) that a crisis may be developing:  Thoughts: \"I'm a burden\", \"I can't do this anymore\" and \"I just want this to end\"  Images: flashbacks and visions of harm: self injury  Thinking Processes: racing thoughts, intrusive thoughts (bothersome, unwanted thoughts that come out of nowhere): I don't want to do this, I can't do this and highly critical and negative thoughts: You're not capable, you don't deserve this life  Mood: worsening depression, hopelessness, helplessness, intense anger and agitation  Behaviors: can't stop crying, aggression, not taking care of myself, sleeping too much and not sleeping enough  Situations: anniversary of deaths of important people and trauma    Step 2: Coping strategies - Things I can do to take my mind off of my problems without contacting another person (relaxation technique, physical activity):  Distress Tolerance Strategies:  play with my pet , paced breathing/progressive muscle relaxation and watching a sad movie just allowing self to feel " "sad  Physical Activities: exercise: walking on walking on pad, deep breathing and stretching   Focus on helpful thoughts:  \"It always passes\" and remind myself of what is important to me: thinking of episodes of depression and how I felt at that time, then thinking of in between times and reminding myself I can do that again  Step 3: People and social settings that provide distraction:              Name: Sister Suma Drew    Phone: [phone]              Name: Garcia  Phone: Number in Phone              Name: David      Phone: Number in Phone  We will work on finding places as a goal    Step 4: Remind myself of people and things that are important to me and worth living for:  Sister, parents, friends, partner, pets     Step 5: When I am in crisis, I can ask these people to help me use my safety plan:              Name: Garcia  Phone: number in phone  Step 6: Making the environment safe:   remove things I could use to hurt myself: give objects to trusted person to dispose of  Step 7: Professionals or agencies I can contact during a crisis:   Local Crisis Services: The new Crisis line from any phone is 988, you can also text a message to this line.       Professionals or agencies I can contact during a crisis:   Suicide LifeLine Chat: suicidepreventionlifeline.org/chat  Suicide Prevention Lifeline: just dial 988  Crisis Text Line Service (available 24 hours a day, 7 days a week): Text [place] to 207236        COMMUNITY RESOURCES FOR MENTAL HEALTH EMERGENCIES:    [facility] [place] [phone]   COPE 24/7 [place] Mobile Team [phone] (adults) [phone] (child)  Poison Control Center [phone]    OR  go to nearest ER  [facility] ([facility]) [facility]  [phone]  National [place] on Mental Illness (www.dio.org): 897.221.1337 or 881-183-0612.   Mental Health Association (www.mentalhealth.org): 201.748.5943 or 027-698-5274.  Quantros/resources for a list of additional " resources (SOS)   [facility] [phone]   Urgent Care Adult Mental Health-[phone] mobile unit 24/7 crisis line  EMPATH UNIT [facility] - Address: [address] [place] [address]; Phone: [phone]     Crisis Services By County: Phone Number:   [place]     [phone]   [place]  [phone]   [facility]  [phone]   [place]    [phone]   [place]/ [place]    [phone]   [place] [phone]   [place]    [phone]   [place]    [phone]   [place] [phone]   [place]     [phone]         Call 911 or go to my nearest emergency department.       I helped develop this safety plan and agree to use it when needed.  I have been given a copy of this plan.       Client signature __________virtual - client will acknowledge via Ruzuku they have received and agree to plan._______________________________________________________  Today s date:  7/14/2023  Completed by Provider Name/ Credentials:  RICKI Parker, [facility]                        July 14, 2023  Adapted from Safety Plan Template 2008 Vicky Paez and Kyle Rivers is reprinted with the express permission of the authors.  No portion of the Safety Plan Template may be reproduced without the express, written permission.  You can contact the authors at bhs@[email or web address] or [email or web address].              Answers for HPI/ROS submitted by the patient on 7/13/2023  If you checked off any problems, how difficult have these problems made it for you to do your work, take care of things at home, or get along with other people?: Not difficult at all  PHQ9 TOTAL SCORE: 1    Answers submitted by the patient for this visit:  Patient Health Questionnaire (Submitted on 8/7/2023)  If you checked off any problems, how difficult have these problems made it for you to do your work, take care of things at home, or get along with other people?: Not difficult at all  PHQ9 TOTAL SCORE: 2  Answers submitted by the  patient for this visit:  Patient Health Questionnaire (Submitted on 9/18/2023)  If you checked off any problems, how difficult have these problems made it for you to do your work, take care of things at home, or get along with other people?: Not difficult at all  PHQ9 TOTAL SCORE: 1  Answers submitted by the patient for this visit:  Patient Health Questionnaire (Submitted on 9/24/2023)  PHQ9 TOTAL SCORE: 2  Answers submitted by the patient for this visit:  Patient Health Questionnaire (Submitted on 11/6/2023)  If you checked off any problems, how difficult have these problems made it for you to do your work, take care of things at home, or get along with other people?: Not difficult at all  PHQ9 TOTAL SCORE: 1      Answers submitted by the patient for this visit:  Patient Health Questionnaire (Submitted on 1/2/2024)  If you checked off any problems, how difficult have these problems made it for you to do your work, take care of things at home, or get along with other people?: Not difficult at all  PHQ9 TOTAL SCORE: 1

## 2024-01-22 ENCOUNTER — VIRTUAL VISIT (OUTPATIENT)
Dept: PSYCHOLOGY | Facility: CLINIC | Age: 39
End: 2024-01-22
Payer: COMMERCIAL

## 2024-01-22 DIAGNOSIS — F41.1 GAD (GENERALIZED ANXIETY DISORDER): ICD-10-CM

## 2024-01-22 DIAGNOSIS — F43.10 PTSD (POST-TRAUMATIC STRESS DISORDER): Primary | ICD-10-CM

## 2024-01-22 DIAGNOSIS — F31.62 BIPOLAR 1 DISORDER, MIXED, MODERATE (H): ICD-10-CM

## 2024-01-22 PROCEDURE — 90834 PSYTX W PT 45 MINUTES: CPT | Mod: 95 | Performed by: SOCIAL WORKER

## 2024-01-22 NOTE — PROGRESS NOTES
[facility]                                     Progress Note    Patient Name: Ryanne Goff  Date: 2024         Service Type: Individual      Session Start Time: 200  Session End Time: 240     Session Length: 38-52    Session #: 12    Attendees: Client attended alone    Service Modality:  Video Visit:      Provider verified identity through the following two step process.  Patient provided:  Patient photo and Patient     Telemedicine Visit: The patient's condition can be safely assessed and treated via synchronous audio and visual telemedicine encounter.      Reason for Telemedicine Visit: Patient has requested telehealth visit    Originating Site (Patient Location): Patient's home    Distant Site (Provider Location): Provider Remote Setting- Home Office    Consent:  The patient/guardian has verbally consented to: the potential risks and benefits of telemedicine (video visit) versus in person care; bill my insurance or make self-payment for services provided; and responsibility for payment of non-covered services.     Patient would like the video invitation sent by:  My Chart    Mode of Communication:  Video Conference via Amwell    Distant Location (Provider):  Off-site    As the provider I attest to compliance with applicable laws and regulations related to telemedicine.    DATA  Interactive Complexity: No  Crisis: No        Progress Since Last Session (Related to Symptoms / Goals / Homework):   Symptoms: Ryanne's partner has accepted a position in [place] and she will be eventually moving there.  This has caused a lot of stress and is approaching quickly.  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 She's stated she's nervous about what this move means for her mental health but wants to make sure she gives the move a chance for her relationship with her  partner.  She plans to reevaluate in 6 months how things are going, but knows she has a good support network and coping skills in the meantime.  Ryanne and partner have recently started talking about having children which is both exciting and overwhelming for them per patient's report.  Ryanne discussed catastrophizing and losing sleep over all the decisions that will need to be made, and ways to make this work.  She has used coping skills that work to some extent, and is realizing how her thoughts are taking form and trying to manage with CBT skills.    Homework: Achieved / completed to satisfaction      Episode of Care Goals: Satisfactory progress - Action (Using changing plan to make reasonable behavioral changes and is noticing results).     Current / Ongoing Stressors and Concerns:   Starting a new job and really enjoying it, but feeling some imposter syndrome.   Partner accepting new job and moving to [place].   Thinking about having children.     Treatment Objective(s) Addressed in This Session:   Trauma - coping skills     Intervention:   Treatment planning     Psychoeducation on the nervous system and panic.  Gave suggestion of additional coping skills to try BLS play list and how to incorporate this into tool box of coping skills.  Provided positive feedback, validation and empathic listening.       Allowed a safe space for Ryanne to discuss and navigate upcoming life changes.  Encouraged use of identifying emotions and describing them.  Praised Ryanne for her ability to focus on what she can do instead of not do in hard situations and reviewed ways in which she has been able to check the facts and stick with reality.  Explored options for transitioning therapists during the move and discussed upcoming ZARINA, also discussed options of staying with this provider.     CBT skills introduced - catch it check it change it, CBT thought log.  Demonstrated skills with issue that Ryanne is experiencing at work.  Answered questions  and encouraged use of positive reframing skills.     Solutions focused work - focused on upcoming move. Supported client in her processing of emotions and big changes.  Used CBT skills to help reframe when appropriate.  Reviewed safety concerns - reporting none at the present and is able to keep self safe using safety plan.  Reviewed use of CBT skills outside of therapy and as well as BLS.    Reviewed safety with no concerns, updated treatment plan, psychoeducation on stages of grief and how it relates to big life changes Ryanne is experiencing.  Encouraged and offered safe space to further discuss and process difficult emotions.     Continued to work on maintenance of helpful coping skills and behaviors.  Explored evolving situation regarding partners new job, and processed associated emotions.  Spent time teaching sleep hygiene and practicing new coping skills related to sleep and waking in the middle of the night.  Practiced using pressure points and areas of the body where there are more nerve endings to use as calming strategies (massage, and light pressure).  Also discussed the rule of 5's and practiced the stop sign thought stopping technique together.     Asked open ended questions to gauge the client's thought processes, values, and present situation.  Utilized CBT skills for catastrophic thinking and validated patient's emotions.  Demonstrated and practiced the container coping skills to help compartmentalize when needed and return to stressful thoughts/materials when better feeling equipped/or with more support.    CURRENT:      Long discussion about shame and guilt, exploration of the root of where these emotions have come from over the years.  Explored benefits of hanging onto these emotions vs letting them go and possible ways of doing this.  Patient discussed her experience in EMDR in the past, but decided with her upcoming wedding and moving it does not feel like the right time.  She reported that it  has felt so good to finally be able to speak freely about these emotions and feel validated in her experience.      Assessments completed prior to visit:  The following assessments were completed by patient for this visit:  PHQ2:       5/5/2022     9:30 AM 5/4/2022     9:06 AM   PHQ-2 ( 1999 [employer])   Q1: Little interest or pleasure in doing things 1 1   Q2: Feeling down, depressed or hopeless 1 1   PHQ-2 Score 2 2   Q1: Little interest or pleasure in doing things  Several days   Q2: Feeling down, depressed or hopeless  Several days   PHQ-2 Score  2     PHQ9:       7/6/2023     5:32 PM 7/13/2023    10:00 AM 8/7/2023     8:30 AM 9/18/2023     9:37 AM 9/24/2023     5:39 PM 11/6/2023     8:31 AM 1/2/2024    10:10 AM   PHQ-9 SCORE   PHQ-9 Total Score MyChart 1 (Minimal depression) 1 (Minimal depression) 2 (Minimal depression) 1 (Minimal depression) 2 (Minimal depression) 1 (Minimal depression) 1 (Minimal depression)   PHQ-9 Total Score 1 1 2 1 2 1 1     GAD2:       5/4/2023     1:25 PM 6/6/2023     2:26 PM 8/30/2023    12:31 PM 9/24/2023     5:39 PM 10/13/2023    11:45 AM 12/4/2023    11:17 AM 1/2/2024    10:09 AM   ANU-2   Feeling nervous, anxious, or on edge 3 2 1 1 1 1 1   Not being able to stop or control worrying 2 2 0 1 1 0 0   ANU-2 Total Score 5    5 4 1 2 2 1 1     GAD7:       5/5/2022     9:30 AM 2/10/2023     1:52 PM 3/15/2023     4:59 PM 4/12/2023     4:28 PM 5/4/2023     1:25 PM 6/6/2023     2:26 PM   ANU-7 SCORE   Total Score  14 (moderate anxiety) 19 (severe anxiety) 11 (moderate anxiety) 14 (moderate anxiety) 10 (moderate anxiety)   Total Score 9 14 19 11 14    14 10     CAGE-AID:       2/10/2023     2:16 PM 5/9/2023    10:00 AM   CAGE-AID Total Score   Total Score     Total Score MyChart         Information is confidential and restricted. Go to Review Flowsheets to unlock data.     PROMIS 10-Global Health (all questions and answers displayed):       2/10/2023     2:16 PM 5/8/2023    12:43 PM 6/2/2023     11:49 AM 6/6/2023     2:27 PM 9/18/2023     9:37 AM 1/2/2024    10:10 AM   PROMIS 10   In general, would you say your health is:    Good Good Very good   In general, would you say your quality of life is:    Good Very good Very good   In general, how would you rate your physical health?    Good Good Very good   In general, how would you rate your mental health, including your mood and your ability to think?    Poor Good Very good   In general, how would you rate your satisfaction with your social activities and relationships?    Fair Very good Very good   In general, please rate how well you carry out your usual social activities and roles    Poor Good Very good   To what extent are you able to carry out your everyday physical activities such as walking, climbing stairs, carrying groceries, or moving a chair?    Mostly Completely Completely   In the past 7 days, how often have you been bothered by emotional problems such as feeling anxious, depressed, or irritable?    Sometimes Rarely Sometimes   In the past 7 days, how would you rate your fatigue on average?    Mild None None   In the past 7 days, how would you rate your pain on average, where 0 means no pain, and 10 means worst imaginable pain?    0 0 0   In general, would you say your health is:    3 3 4   In general, would you say your quality of life is:    3 4 4   In general, how would you rate your physical health?    3 3 4   In general, how would you rate your mental health, including your mood and your ability to think?    1 3 4   In general, how would you rate your satisfaction with your social activities and relationships?    2 4 4   In general, please rate how well you carry out your usual social activities and roles. (This includes activities at home, at work and in your community, and responsibilities as a parent, child, spouse, employee, friend, etc.)    1 3 4   To what extent are you able to carry out your everyday physical activities such as  walking, climbing stairs, carrying groceries, or moving a chair?    4 5 5   In the past 7 days, how often have you been bothered by emotional problems such as feeling anxious, depressed, or irritable?    3 2 3   In the past 7 days, how would you rate your fatigue on average?    2 1 1   In the past 7 days, how would you rate your pain on average, where 0 means no pain, and 10 means worst imaginable pain?    0 0 0   Global Mental Health Score    9 15    15 15   Global Physical Health Score    16 18    18 19   PROMIS TOTAL - SUBSCORES    25 33    33 34       Information is confidential and restricted. Go to Review Flowsheets to unlock data.    Multiple values from one day are sorted in reverse-chronological order     PROMIS 10-Global Health (only subscores and total score):       2/10/2023     2:16 PM 5/8/2023    12:43 PM 6/2/2023    11:49 AM 6/6/2023     2:27 PM 9/18/2023     9:37 AM 1/2/2024    10:10 AM   PROMIS-10 Scores Only   Global Mental Health Score    9 15    15 15   Global Physical Health Score    16 18    18 19   PROMIS TOTAL - SUBSCORES    25 33    33 34       Information is confidential and restricted. Go to Review Flowsheets to unlock data.    Multiple values from one day are sorted in reverse-chronological order     [place] Suicide Severity Rating Scale (Lifetime/Recent)      5/9/2023    10:00 AM   [place] Suicide Severity Rating (Lifetime/Recent)   Q1 Wish to be Dead (Lifetime) Yes   Comments age 11-disapointing mom   Q2 Non-Specific Active Suicidal Thoughts (Lifetime) No   Most Severe Ideation Rating (Lifetime) 2   Most Severe Ideation Description (Lifetime) SA age 19-cutting, drinking and took vicodin plus    Frequency (Lifetime) 1   Duration (Lifetime) 1   Controllability (Lifetime) 2   Protective Factors  (Lifetime) 1   Reasons for Ideation (Lifetime) 0   Q1 Wished to be Dead (Past Month) yes   Q2 Suicidal Thoughts (Past Month) yes   Q3 Suicidal Thought Method yes   Q4 Suicidal Intent  "without Specific Plan no   Q5 Suicide Intent with Specific Plan no   Q6 Suicide Behavior (Lifetime) yes   Within the Past 3 Months? no   Level of Risk per Screen moderate risk     [place] Suicide Severity Rating Scale (Short Version)      5/9/2023    10:00 AM 5/30/2023    11:00 AM   [place] Suicide Severity Rating (Short Version)   Q1 Wished to be Dead (Past Month) yes    Q2 Suicidal Thoughts (Past Month) yes    Q3 Suicidal Thought Method yes    Q4 Suicidal Intent without Specific Plan no    Q5 Suicide Intent with Specific Plan no    Q6 Suicide Behavior (Lifetime) yes    Within the Past 3 Months? no    Level of Risk per Screen moderate risk    1. Wish to be Dead (Since Last Contact)  Y   Wish to be Dead Description (Since Last Contact)  Some days \"I want to be unalive\"   2. Non-Specific Active Suicidal Thoughts (Since Last Contact)  N   Actual Attempt (Since Last Contact)  N   Has subject engaged in non-suicidal self-injurious behavior? (Since Last Contact)  N   Interrupted Attempts (Since Last Contact)  N   Aborted or Self-Interrupted Attempt (Since Last Contact)  N   Preparatory Acts or Behavior (Since Last Contact)  N   Suicide (Since Last Contact)  N   Calculated C-SSRS Risk Score (Since Last Contact)  Low Risk         ASSESSMENT: Current Emotional / Mental Status (status of significant symptoms):   Risk status (Self / Other harm or suicidal ideation)   Patient denies current fears or concerns for personal safety.   Patient reports the following current or recent suicidal ideation or behaviors: past SI - safety plan.   Patient denies current or recent homicidal ideation or behaviors.   Patient denies current or recent self injurious behavior or ideation.   Patient denies other safety concerns.   Patient reports there has been no change in risk factors since their last session.     Patient reports there has been no change in protective factors since their last session.     A safety and risk management plan has " been developed including: Patient consented to co-developed safety plan on previous visit.  Safety and risk management plan was reviewed.   Patient agreed to use safety plan should any safety concerns arise.  A copy was made available to the patient.     Appearance:   Appropriate    Eye Contact:   Good    Psychomotor Behavior: Normal    Attitude:   Cooperative  Friendly Pleasant   Orientation:   All   Speech    Rate / Production: Normal/ Responsive Normal     Volume:  Normal    Mood:    Normal   Affect:    Appropriate    Thought Content:  Clear    Thought Form:  Coherent  Logical    Insight:    Good      Medication Review:  No changes to current psychiatric medication(s)  sees Dr. Mabel Amos.     Medication Compliance:   Yes     Changes in Health Issues:   None reported     Chemical Use Review:   Substance Use: Chemical use reviewed, no active concerns identified      Tobacco Use: No current tobacco use.      Diagnosis:  PTSD  Bipolar 1 Mixed Moderate  ANU      Collateral Reports Completed:   Not Applicable    PLAN: (Patient Tasks / Therapist Tasks / Other)  Try grounding coping skills + add in BLS playlist  Use CBT skills     Work on using pressure points and area of arm where there are more nerve endings to rub/massage when she is having a hard time falling back to sleep    Try rule of fives coping skill along with stop sign coping skill and container coping skill.      Check in in 2-3 weeks - needs to call to schedule with provider Trudy Rooney, while this provider is out on leave.          Swapna Houser, [facility]                                                         ______________________________________________________________________    Individual Treatment Plan    Patient's Name: Ryanne Goff  YOB: 1985    Date of Creation: 8/7/2023    Date Treatment Plan Last Reviewed/Revised: 8/7/2023, 11/6/2023    DSM5 Diagnoses: 296.52 Bipolar I Disorder Current or Most Recent Episode  Depressed, Moderate, 300.02 (F41.1) Generalized Anxiety Disorder, or 309.81 (F43.10) Posttraumatic Stress Disorder (includes Posttraumatic Stress Disorder for Children 6 Years and Younger)  With dissociative symptoms  Psychosocial / Contextual Factors: Ryanne is a 37 year old  American woman who is in a stable supportive relationship and working full time.  PROMIS (reviewed every 90 days): PROMIS-10 Scores        6/6/2023     2:27 PM 9/18/2023     9:37 AM 1/2/2024    10:10 AM   PROMIS-10 Total Score w/o Sub Scores   PROMIS TOTAL - SUBSCORES 25 33    33 34        Referral / Collaboration:  Referral to another professional/service is not indicated at this time..    Anticipated number of session for this episode of care:  27-36   Anticipation frequency of session: Weekly  Anticipated Duration of each session: 38-52 minutes  Treatment plan will be reviewed in 90 days or when goals have been changed.       MeasurableTreatment Goal(s) related to diagnosis / functional impairment(s)  Goal 1: Patient will feel like she is better able to manage the highs and lows of depression and amber; and will use safety plan every time suicidal thoughts come up.      I will know I've met my goal when I am not dwelling as much on the past, but more able to self reflect.        Objective #A (Patient Action)  Patient will use safety plan every time suicidal ideation occurs.    Intervention(s)  Create and use a safety plan, utilize crisis supports  Status: Continued - Date(s): 11/6/2023    Objective #B (Patient Action)    Patient will  work on using positive self talk and use of reframes when needed .  Status: Continued - Date(s): 11/6/2023    Intervention(s)  Therapist will teach  CBT skills, affirmations, wise mind, window of tolerance, and use of checking the facts and reframing when needed .    Objective #C  Patient will identify 2 strategies for more effectively managing Bipolar Disorder - related to being able to reach out to  "supports - even for a positive distraction.  Status: Completed - Date(s): 11/6/2023    Intervention(s)  Therapist will assign homework related to reaching out to supports .      Goal 2: Patient will be able to better manage symptoms of trauma as they are triggered     I will know I've met my goal when I am not feeling as controlled by trauma symptoms - shame/guilt, still thinking about the trauma 3+ hours after it is triggered.      Objective #A (Patient Action)    Status: Completed - Date: 11/6/2023    Patient will  have a better understanding of how trauma affects the brain .    Intervention(s)  Therapist will teach psychoecuation on trauma and how it affects the brain .      Objective #B  Patient will  develop a set of coping skills related to feeling triggered by past trauma .    Status: Completed - Date: 11/6/2023    Intervention(s)  Therapist will provide educational materials on coping skills related to trauma - BLS , grounding skills .    Objective #C  Patient will understand and be able to express feelings of grief as they come.    Intervention(s)  Therapist will teach stage of grief and offer a safe space for patient to be able to express and identify these emotions.    Status: New - Date: 11/6/2023          Patient has reviewed and agreed to the above plan.      Swapna Houser, [facility]  August 7, 2023           [facility] Maddi Goff      SAFETY PLAN:  Step 1: Warning signs / cues (Thoughts, images, mood, situation, behavior) that a crisis may be developing:  Thoughts: \"I'm a burden\", \"I can't do this anymore\" and \"I just want this to end\"  Images: flashbacks and visions of harm: self injury  Thinking Processes: racing thoughts, intrusive thoughts (bothersome, unwanted thoughts that come out of nowhere): I don't want to do this, I can't do this and highly critical and negative thoughts: You're not capable, you don't deserve this " "life  Mood: worsening depression, hopelessness, helplessness, intense anger and agitation  Behaviors: can't stop crying, aggression, not taking care of myself, sleeping too much and not sleeping enough  Situations: anniversary of deaths of important people and trauma    Step 2: Coping strategies - Things I can do to take my mind off of my problems without contacting another person (relaxation technique, physical activity):  Distress Tolerance Strategies:  play with my pet , paced breathing/progressive muscle relaxation and watching a sad movie just allowing self to feel sad  Physical Activities: exercise: walking on walking on pad, deep breathing and stretching   Focus on helpful thoughts:  \"It always passes\" and remind myself of what is important to me: thinking of episodes of depression and how I felt at that time, then thinking of in between times and reminding myself I can do that again  Step 3: People and social settings that provide distraction:              Name: Sister Suma Drew    Phone: [phone]              Name: Garcia  Phone: Number in Phone              Name: David      Phone: Number in Phone  We will work on finding places as a goal    Step 4: Remind myself of people and things that are important to me and worth living for:  Sister, parents, friends, partner, pets     Step 5: When I am in crisis, I can ask these people to help me use my safety plan:              Name: Garcia  Phone: number in phone  Step 6: Making the environment safe:   remove things I could use to hurt myself: give objects to trusted person to dispose of  Step 7: Professionals or agencies I can contact during a crisis:   Local Crisis Services: The new Crisis line from any phone is 988, you can also text a message to this line.       Professionals or agencies I can contact during a crisis:   Suicide LifeLine Chat: suicidepreventionlifeline.org/chat  Suicide Prevention Lifeline: just dial 988  Crisis Text Line Service (available 24 " hours a day, 7 days a week): Text [place] to 941495        COMMUNITY RESOURCES FOR MENTAL HEALTH EMERGENCIES:    [facility] [phone]   COPE 24/7 [place] Mobile Team [phone] (adults) [phone] (child)  Poison Control Center [phone]    OR  go to nearest ER  [facility] ([facility]) [facility]  [phone]  [employer] [place] on Mental [employer]ness (www.dio.org): 991.152.2654 or 123-582-2302.   Mental Health Association (www.mentalhealth.org): 799.509.2152 or 012-731-5013.  [employer]/resources for a list of additional resources (SOS)   [facility] [phone]   Urgent Care Adult Mental Health-[phone] mobile unit 24/7 crisis line  EMPATH UNIT [facility] - Address: [facility] [place] [address]; Phone: [phone]     Crisis Services By County: Phone Number:   [place]     [phone]   [place]  [phone]   [facility]  [phone]   [place]    [phone]   [place]/ [place]    [phone]   [place] [phone]   [place]    [phone]   [place]    [phone]   [place] [phone]   [place]     [phone]         Call 911 or go to my nearest emergency department.       I helped develop this safety plan and agree to use it when needed.  I have been given a copy of this plan.       Client signature __________virtual - client will acknowledge via [employer] they have received and agree to plan._______________________________________________________  Today s date:  7/14/2023  Completed by Provider Name/ Credentials:  RICKI Parker, LICSW                        July 14, 2023  Adapted from Safety Plan Template 2008 Vicky Paez and Kyle Rivers is reprinted with the express permission of the authors.  No portion of the Safety Plan Template may be reproduced without the express, written permission.  You can contact the authors at [email or web address] or [email or web address].              Answers for HPI/ROS submitted  by the patient on 7/13/2023  If you checked off any problems, how difficult have these problems made it for you to do your work, take care of things at home, or get along with other people?: Not difficult at all  PHQ9 TOTAL SCORE: 1    Answers submitted by the patient for this visit:  Patient Health Questionnaire (Submitted on 8/7/2023)  If you checked off any problems, how difficult have these problems made it for you to do your work, take care of things at home, or get along with other people?: Not difficult at all  PHQ9 TOTAL SCORE: 2  Answers submitted by the patient for this visit:  Patient Health Questionnaire (Submitted on 9/18/2023)  If you checked off any problems, how difficult have these problems made it for you to do your work, take care of things at home, or get along with other people?: Not difficult at all  PHQ9 TOTAL SCORE: 1  Answers submitted by the patient for this visit:  Patient Health Questionnaire (Submitted on 9/24/2023)  PHQ9 TOTAL SCORE: 2  Answers submitted by the patient for this visit:  Patient Health Questionnaire (Submitted on 11/6/2023)  If you checked off any problems, how difficult have these problems made it for you to do your work, take care of things at home, or get along with other people?: Not difficult at all  PHQ9 TOTAL SCORE: 1      Answers submitted by the patient for this visit:  Patient Health Questionnaire (Submitted on 1/2/2024)  If you checked off any problems, how difficult have these problems made it for you to do your work, take care of things at home, or get along with other people?: Not difficult at all  PHQ9 TOTAL SCORE: 1

## 2024-02-26 ASSESSMENT — PATIENT HEALTH QUESTIONNAIRE - PHQ9
SUM OF ALL RESPONSES TO PHQ QUESTIONS 1-9: 1
SUM OF ALL RESPONSES TO PHQ QUESTIONS 1-9: 1
10. IF YOU CHECKED OFF ANY PROBLEMS, HOW DIFFICULT HAVE THESE PROBLEMS MADE IT FOR YOU TO DO YOUR WORK, TAKE CARE OF THINGS AT HOME, OR GET ALONG WITH OTHER PEOPLE: NOT DIFFICULT AT ALL

## 2024-02-27 ENCOUNTER — VIRTUAL VISIT (OUTPATIENT)
Dept: PSYCHOLOGY | Facility: CLINIC | Age: 39
End: 2024-02-27
Payer: COMMERCIAL

## 2024-02-27 DIAGNOSIS — F31.62 BIPOLAR 1 DISORDER, MIXED, MODERATE (H): ICD-10-CM

## 2024-02-27 DIAGNOSIS — F41.1 GAD (GENERALIZED ANXIETY DISORDER): ICD-10-CM

## 2024-02-27 DIAGNOSIS — F43.10 PTSD (POST-TRAUMATIC STRESS DISORDER): Primary | ICD-10-CM

## 2024-02-27 PROCEDURE — 90834 PSYTX W PT 45 MINUTES: CPT | Mod: 95 | Performed by: COUNSELOR

## 2024-02-27 NOTE — PROGRESS NOTES
[facility]                                     Progress Note    Patient Name: Ryanne Goff  Date: 2024         Service Type: Individual      Session Start Time: 8:00am  Session End Time: 8:50am     Session Length: 38-52 mins    Session #: 1    Attendees: Client attended alone    Service Modality:  Video Visit:      Provider verified identity through the following two step process.  Patient provided:  Patient  and Patient address    Telemedicine Visit: The patient's condition can be safely assessed and treated via synchronous audio and visual telemedicine encounter.      Reason for Telemedicine Visit: Patient has requested telehealth visit    Originating Site (Patient Location): Patient's home    Distant Site (Provider Location): Provider Remote Setting- Home Office    Consent:  The patient/guardian has verbally consented to: the potential risks and benefits of telemedicine (video visit) versus in person care; bill my insurance or make self-payment for services provided; and responsibility for payment of non-covered services.     Patient would like the video invitation sent by:  My Chart    Mode of Communication:  Video Conference via Amwell    Distant Location (Provider):  Off-site    As the provider I attest to compliance with applicable laws and regulations related to telemedicine.    DATA  Interactive Complexity: No  Crisis: No        Progress Since Last Session (Related to Symptoms / Goals / Homework):   Symptoms: No change initial session with this therapist    Homework: Completed in session      Episode of Care Goals: Satisfactory progress - ACTION (Actively working towards change); Intervened by reinforcing change plan / affirming steps taken     Current / Ongoing Stressors and Concerns:   Pt reports experience of anxiety and depression. She reports wanting to work on things as they come up and wanting to work on the cycle of stress the occurs that leads to depression.       Treatment Objective(s) Addressed in This Session:   Building rapport     Intervention:   Therapist met with patient to develop goals and interventions. Therapist utilized person centered therapy to identify patient's desires for therapy and reviewed previous therapeutic goals. Therapist worked to build rapport with the patient and listened with empathy as the patient spoke in more depth regarding their experience. Therapist worked to normalize the pt's experience and work toward goal centered talk. Together pt and therapist worked on solidifying treatment goals and interventions for future therapy work.       Assessments completed prior to visit:  The following assessments were completed by patient for this visit:  PHQ9:       7/13/2023    10:00 AM 8/7/2023     8:30 AM 9/18/2023     9:37 AM 9/24/2023     5:39 PM 11/6/2023     8:31 AM 1/2/2024    10:10 AM 2/26/2024     1:16 PM   PHQ-9 SCORE   PHQ-9 Total Score MyChart 1 (Minimal depression) 2 (Minimal depression) 1 (Minimal depression) 2 (Minimal depression) 1 (Minimal depression) 1 (Minimal depression) 1 (Minimal depression)   PHQ-9 Total Score 1 2 1 2 1 1 1     GAD7:       5/5/2022     9:30 AM 2/10/2023     1:52 PM 3/15/2023     4:59 PM 4/12/2023     4:28 PM 5/4/2023     1:25 PM 6/6/2023     2:26 PM   ANU-7 SCORE   Total Score  14 (moderate anxiety) 19 (severe anxiety) 11 (moderate anxiety) 14 (moderate anxiety) 10 (moderate anxiety)   Total Score 9 14 19 11 14    14 10         ASSESSMENT: Current Emotional / Mental Status (status of significant symptoms):   Risk status (Self / Other harm or suicidal ideation)   Patient denies current fears or concerns for personal safety.   Patient reports the following current or recent suicidal ideation or behaviors: past SI - safety plan on file.   Patient denies current or recent homicidal ideation or behaviors.   Patient denies current or recent self injurious behavior or ideation.   Patient denies other safety  "concerns.   Patient reports there has been no change in risk factors since their last session.     Patient reports there has been no change in protective factors since their last session.     A safety and risk management plan has been developed including: Patient consented to co-developed safety plan.  Safety and risk management plan was completed - see below.  Patient agreed to use safety plan should any safety concerns arise.  A copy was given to the patient.     Appearance:   Appropriate    Eye Contact:   Good    Psychomotor Behavior: Normal    Attitude:   Cooperative    Orientation:   All   Speech    Rate / Production: Normal     Volume:  Normal    Mood:    Normal   Affect:    Appropriate    Thought Content:  Clear    Thought Form:  Coherent  Logical    Insight:    Good      Medication Review:   No current psychiatric medications prescribed     Medication Compliance:   Yes     Changes in Health Issues:   None reported     Chemical Use Review:   Substance Use: Chemical use reviewed, no active concerns identified      Tobacco Use: No current tobacco use.      Diagnosis:  1. PTSD (post-traumatic stress disorder)    2. Bipolar 1 disorder, mixed, moderate (H)    3. ANU (generalized anxiety disorder)        Collateral Reports Completed:   Not Applicable    PLAN: (Patient Tasks / Therapist Tasks / Other)  1) Patient will log moments in the past when she has experienced a \"spiral\"  2) next session scheduled for 2 weeks out        GENIE Rooney [facility]                                                         ______________________________________________________________________    Individual Treatment Plan    Patient's Name: Ryanne Goff  YOB: 1985    Date of Creation: 2/27/2024    Date Treatment Plan Last Reviewed/Revised: next review due 5/27/24    DSM5 Diagnoses: 296.52 Bipolar I Disorder Current or Most Recent Episode Depressed, Moderate, 300.02 (F41.1) Generalized Anxiety Disorder, or 309.81 " (F43.10) Posttraumatic Stress Disorder (includes Posttraumatic Stress Disorder for Children 6 Years and Younger)  With dissociative symptoms  Psychosocial / Contextual Factors: Ryanne is a 37 year old  American woman who is in a stable supportive relationship and working full time.     PROMIS (reviewed every 90 days):         6/6/2023     2:27 PM 9/18/2023     9:37 AM 1/2/2024    10:10 AM   PROMIS-10 Total Score w/o Sub Scores   PROMIS TOTAL - SUBSCORES 25 33    33 34         Referral / Collaboration:  Referral to another professional/service is not indicated at this time..    Anticipated number of session for this episode of care:  27-36  (pt will transition back to Swapna Houser once she is back from [place])    Anticipation frequency of session: Weekly-every other week  Anticipated Duration of each session: 38-52 minutes  Treatment plan will be reviewed in 90 days or when goals have been changed.       MeasurableTreatment Goal(s) related to diagnosis / functional impairment(s)  Goal 1: Patient will feel like she is better able to manage the highs and lows of depression and amber; and will use safety plan every time suicidal thoughts come up.      I will know I've met my goal when I am not dwelling as much on the past, but more able to self reflect.          Objective #A (Patient Action)  Patient will use safety plan every time suicidal ideation occurs.  Intervention(s)  Create and use a safety plan, utilize crisis supports  Status: Continued - Date(s): 2/27/2024       Objective #B (Patient Action)                          Patient will  work on using positive self talk and use of reframes when needed .  Status: Continued - Date(s): 2/27/2024  Intervention(s)  Therapist will teach  CBT skills, affirmations, wise mind, window of tolerance, and use of checking the facts and reframing when needed .      Goal 2: Patient will be able to better manage symptoms of trauma as they are triggered     I will know I've  met my goal when I am not feeling as controlled by trauma symptoms - shame/guilt, still thinking about the trauma 3+ hours after it is triggered.    Objective #A  Patient will understand and be able to express feelings of grief as they come.  Intervention(s)  Therapist will teach stage of grief and offer a safe space for patient to be able to express and identify these emotions.  Status: Continued - Date: 2/27/2024        Patient has reviewed and agreed to the above plan.      GENIE Rooney, [facility]  February 27, 2024

## 2024-03-01 ENCOUNTER — TRANSFERRED RECORDS (OUTPATIENT)
Dept: HEALTH INFORMATION MANAGEMENT | Facility: CLINIC | Age: 39
End: 2024-03-01
Payer: COMMERCIAL

## 2024-03-15 ENCOUNTER — VIRTUAL VISIT (OUTPATIENT)
Dept: PSYCHOLOGY | Facility: CLINIC | Age: 39
End: 2024-03-15
Payer: COMMERCIAL

## 2024-03-15 DIAGNOSIS — F43.10 PTSD (POST-TRAUMATIC STRESS DISORDER): Primary | ICD-10-CM

## 2024-03-15 DIAGNOSIS — F31.62 BIPOLAR 1 DISORDER, MIXED, MODERATE (H): ICD-10-CM

## 2024-03-15 DIAGNOSIS — F41.1 GAD (GENERALIZED ANXIETY DISORDER): ICD-10-CM

## 2024-03-15 PROCEDURE — 90834 PSYTX W PT 45 MINUTES: CPT | Mod: 95 | Performed by: COUNSELOR

## 2024-03-15 PROCEDURE — 90785 PSYTX COMPLEX INTERACTIVE: CPT | Mod: 95 | Performed by: COUNSELOR

## 2024-03-15 NOTE — PROGRESS NOTES
[facility]                                     Progress Note    Patient Name: Ryanne Goff  Date: 3/15/2024         Service Type: Individual      Session Start Time: 8:00am  Session End Time: 8:40am     Session Length: 38-52 mins    Session #: 2    Attendees: Client attended alone    Service Modality:  Video Visit:      Provider verified identity through the following two step process.  Patient provided:  Patient  and Patient address    Telemedicine Visit: The patient's condition can be safely assessed and treated via synchronous audio and visual telemedicine encounter.      Reason for Telemedicine Visit: Patient has requested telehealth visit    Originating Site (Patient Location): Patient's home    Distant Site (Provider Location): Provider Remote Setting- Home Office    Consent:  The patient/guardian has verbally consented to: the potential risks and benefits of telemedicine (video visit) versus in person care; bill my insurance or make self-payment for services provided; and responsibility for payment of non-covered services.     Patient would like the video invitation sent by:  My Chart    Mode of Communication:  Video Conference via Amwell    Distant Location (Provider):  Off-site    As the provider I attest to compliance with applicable laws and regulations related to telemedicine.    DATA  Interactive Complexity: -Use of play equipment or physical devices to overcome barriers to diagnostic or therapeutic interaction with a patient who is not fluent in the same language or who has not developed or lost expressive or receptive language skills to use or understand typical language  Crisis: No        Progress Since Last Session (Related to Symptoms / Goals / Homework):   Symptoms: No change initial session with this therapist    Homework: Completed in session      Episode of Care Goals: Satisfactory progress - ACTION (Actively working towards change); Intervened by reinforcing  "change plan / affirming steps taken     Current / Ongoing Stressors and Concerns:   Pt reports experience of anxiety and depression. She reports wanting to work on things as they come up and wanting to work on the cycle of stress the occurs that leads to depression.   Pt reports a fear of flying and over the last couple of weeks but struggling with overwhelming thoughts of the plane crashing. This happens about 2-3 times per day.     Treatment Objective(s) Addressed in This Session:   Goal 1: Patient will feel like she is better able to manage the highs and lows of depression and amber; and will use safety plan every time suicidal thoughts come up.    Objective #A (Patient Action)  Patient will use safety plan every time suicidal ideation occurs.  Objective #B (Patient Action)                          Patient will  work on using positive self talk and use of reframes when needed.    Goal 2: Patient will be able to better manage symptoms of trauma as they are triggered     Objective #A  Patient will understand and be able to express feelings of grief as they come.     Intervention:   Therapist utilized the EMDR containment technique with the patient. The patient worked to build a \"container\" to contain the difficult fears until she is able to take it out and process it in a health and safe manner. Patient's mental container includes glass container. Therapist guided the patient through the mental practice of containment and encouraged the patient to practice this throughout the week when feeling escalated emotions.   Led client in guided imagery exercise designed to help client cultivate association to a past \"safe state\" to be recalled in future times of distress.  Client was invited to begin diaphragmatic breathing.  Client was then asked to recall an experience of peace, tranquility, and safety.  Client was asked to verbalize details regarding the following representational modes: visual, auditory, tactile, " olfactory and cognitive.  Client was invited to choose a word or phrase that summarized this experience and instructed that recalling this word or phrase in times of future distress may recall this association of peace, tranquility, and safety. Added BLS to strengthen the connection.        Assessments completed prior to visit:  The following assessments were completed by patient for this visit:  PHQ9:       7/13/2023    10:00 AM 8/7/2023     8:30 AM 9/18/2023     9:37 AM 9/24/2023     5:39 PM 11/6/2023     8:31 AM 1/2/2024    10:10 AM 2/26/2024     1:16 PM   PHQ-9 SCORE   PHQ-9 Total Score MyChart 1 (Minimal depression) 2 (Minimal depression) 1 (Minimal depression) 2 (Minimal depression) 1 (Minimal depression) 1 (Minimal depression) 1 (Minimal depression)   PHQ-9 Total Score 1 2 1 2 1 1 1     GAD7:       5/5/2022     9:30 AM 2/10/2023     1:52 PM 3/15/2023     4:59 PM 4/12/2023     4:28 PM 5/4/2023     1:25 PM 6/6/2023     2:26 PM   ANU-7 SCORE   Total Score  14 (moderate anxiety) 19 (severe anxiety) 11 (moderate anxiety) 14 (moderate anxiety) 10 (moderate anxiety)   Total Score 9 14 19 11 14    14 10         ASSESSMENT: Current Emotional / Mental Status (status of significant symptoms):   Risk status (Self / Other harm or suicidal ideation)   Patient denies current fears or concerns for personal safety.   Patient reports the following current or recent suicidal ideation or behaviors: past SI - safety plan on file.   Patient denies current or recent homicidal ideation or behaviors.   Patient denies current or recent self injurious behavior or ideation.   Patient denies other safety concerns.   Patient reports there has been no change in risk factors since their last session.     Patient reports there has been no change in protective factors since their last session.     A safety and risk management plan has been developed including: Patient consented to co-developed safety plan.  Safety and risk management plan was  "completed - see below.  Patient agreed to use safety plan should any safety concerns arise.  A copy was given to the patient.     Appearance:   Appropriate    Eye Contact:   Good    Psychomotor Behavior: Normal    Attitude:   Cooperative    Orientation:   All   Speech    Rate / Production: Normal     Volume:  Normal    Mood:    Anxious    Affect:    Worrisome    Thought Content:  Clear    Thought Form:  Coherent  Logical    Insight:    Good      Medication Review:   No current psychiatric medications prescribed     Medication Compliance:   Yes     Changes in Health Issues:   None reported     Chemical Use Review:   Substance Use: Chemical use reviewed, no active concerns identified      Tobacco Use: No current tobacco use.      Diagnosis:  1. PTSD (post-traumatic stress disorder)    2. Bipolar 1 disorder, mixed, moderate (H)    3. ANU (generalized anxiety disorder)          Collateral Reports Completed:   Not Applicable    PLAN: (Patient Tasks / Therapist Tasks / Other)  1) Patient will log moments in the past when she has experienced a \"spiral\"  2) next session scheduled for 2 weeks out        GENIE Rooney, [facility]                                                         ______________________________________________________________________    Individual Treatment Plan    Patient's Name: Ryanne Goff  YOB: 1985    Date of Creation: 2/27/2024    Date Treatment Plan Last Reviewed/Revised: next review due 5/27/24    DSM5 Diagnoses: 296.52 Bipolar I Disorder Current or Most Recent Episode Depressed, Moderate, 300.02 (F41.1) Generalized Anxiety Disorder, or 309.81 (F43.10) Posttraumatic Stress Disorder (includes Posttraumatic Stress Disorder for Children 6 Years and Younger)  With dissociative symptoms  Psychosocial / Contextual Factors: Ryanne is a 37 year old  American woman who is in a stable supportive relationship and working full time.     PROMIS (reviewed every 90 days):         6/6/2023    "  2:27 PM 9/18/2023     9:37 AM 1/2/2024    10:10 AM   PROMIS-10 Total Score w/o Sub Scores   PROMIS TOTAL - SUBSCORES 25 33    33 34         Referral / Collaboration:  Referral to another professional/service is not indicated at this time..    Anticipated number of session for this episode of care:  27-36  (pt will transition back to Swapna Houser once she is back from [place])    Anticipation frequency of session: Weekly-every other week  Anticipated Duration of each session: 38-52 minutes  Treatment plan will be reviewed in 90 days or when goals have been changed.       MeasurableTreatment Goal(s) related to diagnosis / functional impairment(s)  Goal 1: Patient will feel like she is better able to manage the highs and lows of depression and amber; and will use safety plan every time suicidal thoughts come up.      I will know I've met my goal when I am not dwelling as much on the past, but more able to self reflect.          Objective #A (Patient Action)  Patient will use safety plan every time suicidal ideation occurs.  Intervention(s)  Create and use a safety plan, utilize crisis supports  Status: Continued - Date(s): 2/27/2024       Objective #B (Patient Action)                          Patient will  work on using positive self talk and use of reframes when needed .  Status: Continued - Date(s): 2/27/2024  Intervention(s)  Therapist will teach  CBT skills, affirmations, wise mind, window of tolerance, and use of checking the facts and reframing when needed .      Goal 2: Patient will be able to better manage symptoms of trauma as they are triggered     I will know I've met my goal when I am not feeling as controlled by trauma symptoms - shame/guilt, still thinking about the trauma 3+ hours after it is triggered.    Objective #A  Patient will understand and be able to express feelings of grief as they come.  Intervention(s)  Therapist will teach stage of grief and offer a safe space for patient to be able to  express and identify these emotions.  Status: Continued - Date: 2/27/2024        Patient has reviewed and agreed to the above plan.      GENIE Rooney, [facility]  February 27, 2024

## 2024-03-18 ENCOUNTER — TRANSFERRED RECORDS (OUTPATIENT)
Dept: HEALTH INFORMATION MANAGEMENT | Facility: CLINIC | Age: 39
End: 2024-03-18
Payer: COMMERCIAL

## 2024-04-05 ENCOUNTER — VIRTUAL VISIT (OUTPATIENT)
Dept: PSYCHOLOGY | Facility: CLINIC | Age: 39
End: 2024-04-05
Payer: COMMERCIAL

## 2024-04-05 DIAGNOSIS — F31.62 BIPOLAR 1 DISORDER, MIXED, MODERATE (H): ICD-10-CM

## 2024-04-05 DIAGNOSIS — F43.10 PTSD (POST-TRAUMATIC STRESS DISORDER): Primary | ICD-10-CM

## 2024-04-05 DIAGNOSIS — F41.1 GAD (GENERALIZED ANXIETY DISORDER): ICD-10-CM

## 2024-04-05 PROCEDURE — 90834 PSYTX W PT 45 MINUTES: CPT | Mod: 95 | Performed by: COUNSELOR

## 2024-04-05 NOTE — PROGRESS NOTES
Discharge Summary  Multiple Sessions    Client Name: Ryanne Goff MRN#: [id number] YOB: 1985    Discharge Date:   2024    Service Modality: Video Visit:      Provider verified identity through the following two step process.  Patient provided:  Patient  and Patient address    Telemedicine Visit: The patient's condition can be safely assessed and treated via synchronous audio and visual telemedicine encounter.      Reason for Telemedicine Visit: Patient has requested telehealth visit    Originating Site (Patient Location): Patient's home    Distant Site (Provider Location): Provider Remote Setting- Home Office    Consent:  The patient/guardian has verbally consented to: the potential risks and benefits of telemedicine (video visit) versus in person care; bill my insurance or make self-payment for services provided; and responsibility for payment of non-covered services.     Patient would like the video invitation sent by:  My Chart    Mode of Communication:  Video Conference via [id number]    Distant Location (Provider):  Off-site    As the provider I attest to compliance with applicable laws and regulations related to telemedicine.    Service Type: Individual      Session Start Time: 8:00am  Session End Time: 8:40am      Session Length: 38-52 mins     Session #: 3     Attendees: Client attended alone      Focus of Treatment Objective(s):  Client's presenting concerns included: Anxiety - decrease  Stage of Change at time of Discharge: ACTION (Actively working towards change)    Medication Adherence:  Yes - however, patient reported running out of lurasidone and will be working with her psychiatrist regarding the medication    Chemical Use:  No    Assessment: Current Emotional / Mental Status (status of significant symptoms):    Risk status (Self / Other harm or suicidal ideation)  Client denies current fears or concerns for personal safety.  Client denies current or  recent suicidal ideation or behaviors.  Client denies current or recent homicidal ideation or behaviors.  Client denies current or recent self injurious behavior or ideation.  Client denies other safety concerns.  A safety and risk management plan has not been developed at this time, however client was given the after-hours number should there be a change in any of these risk factors.    Appearance:   Appropriate   Eye Contact:   Good   Psychomotor Behavior: Normal   Attitude:   Cooperative   Orientation:   All  Speech   Rate / Production: Normal    Volume:  Normal   Mood:    Normal  Affect:    Appropriate  Worrisome   Thought Content:  Clear   Thought Form:  Coherent  Logical   Insight:   Fair     DSM5 Diagnoses: (Sustained by DSM5 Criteria Listed Above)  Diagnoses: 296.52 Bipolar I Disorder Current or Most Recent Episode Depressed, Moderate  300.02 (F41.1) Generalized Anxiety Disorder  309.81 (F43.10) Posttraumatic Stress Disorder (includes Posttraumatic Stress Disorder for Children 6 Years and Younger)  Without dissociative symptoms  Psychosocial & Contextual Factors: Ryanne is a 37 year old  American woman, good support system  Assessments Completed:  The following assessments were completed by patient for this visit:  PHQ9:       8/7/2023     8:30 AM 9/18/2023     9:37 AM 9/24/2023     5:39 PM 11/6/2023     8:31 AM 1/2/2024    10:10 AM 2/26/2024     1:16 PM 4/5/2024     7:57 AM   PHQ-9 SCORE   PHQ-9 Total Score MyChart 2 (Minimal depression) 1 (Minimal depression) 2 (Minimal depression) 1 (Minimal depression) 1 (Minimal depression) 1 (Minimal depression) 1 (Minimal depression)   PHQ-9 Total Score 2 1 2 1 1 1 1     GAD7:       5/5/2022     9:30 AM 2/10/2023     1:52 PM 3/15/2023     4:59 PM 4/12/2023     4:28 PM 5/4/2023     1:25 PM 6/6/2023     2:26 PM   ANU-7 SCORE   Total Score  14 (moderate anxiety) 19 (severe anxiety) 11 (moderate anxiety) 14 (moderate anxiety) 10 (moderate anxiety)   Total Score 9 14 19  11 14    14 10       Reason for Discharge:  Due to this therapist going on a ZARINA, the pt stated she will reach out if she needs to schedule a session with Swapna Houser.      Aftercare Plan:  Client may resume counseling services at any time in the future by calling the [facility] Intake Office, [phone].      GENIE Rooney, [facility]  April 5, 2024

## 2024-04-23 SDOH — HEALTH STABILITY: PHYSICAL HEALTH: ON AVERAGE, HOW MANY MINUTES DO YOU ENGAGE IN EXERCISE AT THIS LEVEL?: 60 MIN

## 2024-04-23 SDOH — HEALTH STABILITY: PHYSICAL HEALTH: ON AVERAGE, HOW MANY DAYS PER WEEK DO YOU ENGAGE IN MODERATE TO STRENUOUS EXERCISE (LIKE A BRISK WALK)?: 2 DAYS

## 2024-04-23 ASSESSMENT — SOCIAL DETERMINANTS OF HEALTH (SDOH): HOW OFTEN DO YOU GET TOGETHER WITH FRIENDS OR RELATIVES?: TWICE A WEEK

## 2024-04-24 ENCOUNTER — OFFICE VISIT (OUTPATIENT)
Dept: FAMILY MEDICINE | Facility: CLINIC | Age: 39
End: 2024-04-24
Payer: COMMERCIAL

## 2024-04-24 VITALS
HEART RATE: 59 BPM | DIASTOLIC BLOOD PRESSURE: 64 MMHG | BODY MASS INDEX: 21.3 KG/M2 | TEMPERATURE: 98 F | SYSTOLIC BLOOD PRESSURE: 102 MMHG | OXYGEN SATURATION: 99 % | WEIGHT: 120.2 LBS | HEIGHT: 63 IN | RESPIRATION RATE: 16 BRPM

## 2024-04-24 DIAGNOSIS — Z00.00 ROUTINE GENERAL MEDICAL EXAMINATION AT A HEALTH CARE FACILITY: Primary | ICD-10-CM

## 2024-04-24 DIAGNOSIS — Z13.1 SCREENING FOR DIABETES MELLITUS: ICD-10-CM

## 2024-04-24 DIAGNOSIS — Z13.220 SCREENING FOR HYPERLIPIDEMIA: ICD-10-CM

## 2024-04-24 LAB
ANION GAP SERPL CALCULATED.3IONS-SCNC: 11 MMOL/L (ref 7–15)
BUN SERPL-MCNC: 17.9 MG/DL (ref 6–20)
CALCIUM SERPL-MCNC: 9.7 MG/DL (ref 8.6–10)
CHLORIDE SERPL-SCNC: 103 MMOL/L (ref 98–107)
CHOLEST SERPL-MCNC: 220 MG/DL
CREAT SERPL-MCNC: 0.77 MG/DL (ref 0.51–0.95)
DEPRECATED HCO3 PLAS-SCNC: 26 MMOL/L (ref 22–29)
EGFRCR SERPLBLD CKD-EPI 2021: >90 ML/MIN/1.73M2
FASTING STATUS PATIENT QL REPORTED: YES
GLUCOSE SERPL-MCNC: 89 MG/DL (ref 70–99)
HDLC SERPL-MCNC: 74 MG/DL
LDLC SERPL CALC-MCNC: 126 MG/DL
NONHDLC SERPL-MCNC: 146 MG/DL
POTASSIUM SERPL-SCNC: 4 MMOL/L (ref 3.4–5.3)
SODIUM SERPL-SCNC: 140 MMOL/L (ref 135–145)
TRIGL SERPL-MCNC: 102 MG/DL

## 2024-04-24 PROCEDURE — 80048 BASIC METABOLIC PNL TOTAL CA: CPT | Performed by: NURSE PRACTITIONER

## 2024-04-24 PROCEDURE — 99395 PREV VISIT EST AGE 18-39: CPT | Performed by: NURSE PRACTITIONER

## 2024-04-24 PROCEDURE — 80061 LIPID PANEL: CPT | Performed by: NURSE PRACTITIONER

## 2024-04-24 PROCEDURE — 36415 COLL VENOUS BLD VENIPUNCTURE: CPT | Performed by: NURSE PRACTITIONER

## 2024-04-24 ASSESSMENT — PAIN SCALES - GENERAL: PAINLEVEL: NO PAIN (0)

## 2024-04-24 NOTE — PROGRESS NOTES
Preventive Care Visit  [facility]  Angela Mccormick NP, Nurse Practitioner - Family  Apr 24, 2024      Assessment & Plan     Routine general medical examination at a health care facility    - Lipid panel reflex to direct LDL Fasting; Future  - Basic metabolic panel  (Ca, Cl, CO2, Creat, Gluc, K, Na, BUN); Future  - Lipid panel reflex to direct LDL Fasting  - Basic metabolic panel  (Ca, Cl, CO2, Creat, Gluc, K, Na, BUN)    Screening for diabetes mellitus    - Basic metabolic panel  (Ca, Cl, CO2, Creat, Gluc, K, Na, BUN); Future  - Basic metabolic panel  (Ca, Cl, CO2, Creat, Gluc, K, Na, BUN)    Screening for hyperlipidemia    - Lipid panel reflex to direct LDL Fasting; Future  - Lipid panel reflex to direct LDL Fasting      Counseling  Appropriate preventive services were discussed with this patient, including applicable screening as appropriate for fall prevention, nutrition, physical activity, Tobacco-use cessation, weight loss and cognition.  Checklist reviewing preventive services available has been given to the patient.  Reviewed patient's diet, addressing concerns and/or questions.   She is at risk for lack of exercise and has been provided with information to increase physical activity for the benefit of her well-being.     Reviewed guidelines on buspirone and lamotrigine use in regards to pregnancy considerations - and from what I found it would be recommended for patient to weaning off prior to pregnancy due to potential increased risks of use during pregnancy.  Discussed that she should work closely with her mental health prescriber regarding this; patient agreeable, all questions answered to the best of my ability.        Thomas Pearl is a 38 year old, presenting for the following:  Physical (Fasting )        4/24/2024     8:42 AM   Additional Questions   Roomed by Viv BUCKNER        Health Care Directive  Patient does not have a Health Care Directive or Living Will: Discussed  advance care planning with patient; information given to patient to review.    History of Present Illness       Reason for visit:  Medication management and annual phydical exam    She eats 2-3 servings of fruits and vegetables daily.She consumes 0 sweetened beverage(s) daily.She exercises with enough effort to increase her heart rate 60 or more minutes per day.  She exercises with enough effort to increase her heart rate 5 days per week.   She is taking medications regularly.    Has not taken Seroqual for the past 8 months.    Thinking of having a child within the next year.  Asks for advise on her mental health medications buspirone and lamotrigine which are the two that she is taking regularly.  Asking if they are okay to take in pregnancy or not.          4/23/2024   General Health   How would you rate your overall physical health? Good   Feel stress (tense, anxious, or unable to sleep) To some extent   (!) STRESS CONCERN      4/23/2024   Nutrition   Three or more servings of calcium each day? (!) NO   Diet: Regular (no restrictions)   How many servings of fruit and vegetables per day? (!) 0-1   How many sweetened beverages each day? 0-1         4/23/2024   Exercise   Days per week of moderate/strenous exercise 2 days   Average minutes spent exercising at this level 60 min   (!) EXERCISE CONCERN      4/23/2024   Social Factors   Frequency of gathering with friends or relatives Twice a week   Worry food won't last until get money to buy more No   Food not last or not have enough money for food? No   Do you have housing?  Yes   Are you worried about losing your housing? No   Lack of transportation? No   Unable to get utilities (heat,electricity)? No         4/23/2024   Dental   Dentist two times every year? Yes         4/23/2024   TB Screening   Were you born outside of the [place]? No               4/23/2024   Substance Use   Alcohol more than 3/day or more than 7/wk No   Do you use any other substances  recreationally? No     Social History     Tobacco Use    Smoking status: Former     Current packs/day: 0.00     Types: Cigarettes     Quit date: 2019     Years since quittin.3    Smokeless tobacco: Never   Vaping Use    Vaping status: Never Used   Substance Use Topics    Alcohol use: Yes    Drug use: Never           2023   LAST FHS-7 RESULTS   1st degree relative breast or ovarian cancer Unknown   Any relative bilateral breast cancer Unknown   Any male have breast cancer Unknown   Any ONE woman have BOTH breast AND ovarian cancer Unknown   Any woman with breast cancer before 50yrs Unknown   2 or more relatives with breast AND/OR ovarian cancer Unknown   2 or more relatives with breast AND/OR bowel cancer Unknown        Mammogram Screening - Patient under 40 years of age: Routine Mammogram Screening not recommended.         2024   STI Screening   New sexual partner(s) since last STI/HIV test? No     History of abnormal Pap smear: NO - age 30-65 PAP every 5 years with negative HPV co-testing recommended        Latest Ref Rng & Units 2022    10:08 AM   PAP / HPV   PAP  Negative for Intraepithelial Lesion or Malignancy (NILM)    HPV 16 DNA Negative Negative    HPV 18 DNA Negative Negative    Other HR HPV Negative Negative            2024   Contraception/Family Planning   Questions about contraception or family planning (!) YES        Reviewed and updated as needed this visit by Provider     Meds  Problems  Med Hx  Surg Hx  Fam Hx            BP Readings from Last 3 Encounters:   24 102/64   23 102/62   22 113/72    Wt Readings from Last 3 Encounters:   24 54.5 kg (120 lb 3.2 oz)   23 59.6 kg (131 lb 6.4 oz)   22 61.2 kg (135 lb)                  Patient Active Problem List   Diagnosis    Moderate episode of recurrent major depressive disorder (H)    Severe episode of recurrent major depressive disorder, without psychotic features (H)    Bipolar 1  "disorder, mixed, moderate (H)    ANU (generalized anxiety disorder)     Past Surgical History:   Procedure Laterality Date    wisdom teeth         Social History     Tobacco Use    Smoking status: Former     Current packs/day: 0.00     Types: Cigarettes     Quit date: 2019     Years since quittin.3    Smokeless tobacco: Never   Substance Use Topics    Alcohol use: Yes     Family History   Problem Relation Age of Onset    Cancer Mother         Lung Cancer    Hypertension Mother     Hyperlipidemia Mother     Colon Cancer Mother         Polyps - had 2/3 stomach removed    Depression Father         Never been diagnosed, but very obvious    Anxiety Disorder Sister     Depression Sister          Current Outpatient Medications   Medication Sig Dispense Refill    busPIRone (BUSPAR) 15 MG tablet Take 1 tablet (15 mg) by mouth 2 times daily 60 tablet 0    clonazePAM (KLONOPIN) 0.5 MG tablet Take 1 tablet (0.5 mg) by mouth daily as needed for anxiety (and panic attacks) 15 tablet 0    hydrOXYzine (ATARAX) 25 MG tablet       lamoTRIgine (LAMICTAL) 150 MG tablet TAKE ONE TABLET BY MOUTH ONE TIME DAILY 30 tablet 0    QUEtiapine (SEROQUEL) 50 MG tablet Take 1 tablet (50 mg) by mouth nightly as needed (sleep and anxiety)  Not taking 30 tablet 1    valACYclovir (VALTREX) 1000 mg tablet Take 2 tablets (2,000 mg) by mouth 2 times daily for 1 day (Patient not taking: Reported on 2023) 4 tablet 3     No Known Allergies         Objective    Exam  /64 (BP Location: Right arm, Patient Position: Sitting, Cuff Size: Adult Regular)   Pulse 59   Temp 98  F (36.7  C) (Oral)   Resp 16   Ht 1.588 m (5' 2.5\")   Wt 54.5 kg (120 lb 3.2 oz)   LMP 2024   SpO2 99%   BMI 21.63 kg/m     Estimated body mass index is 21.63 kg/m  as calculated from the following:    Height as of this encounter: 1.588 m (5' 2.5\").    Weight as of this encounter: 54.5 kg (120 lb 3.2 oz).    Physical Exam  GENERAL: alert and no distress  EYES: " Eyes grossly normal to inspection, PERRL and conjunctivae and sclerae normal  HENT: ear canals and TM's normal, nose and mouth without ulcers or lesions  NECK: no adenopathy, no asymmetry, masses, or scars  RESP: lungs clear to auscultation - no rales, rhonchi or wheezes  BREAST: normal without masses, tenderness or nipple discharge and no palpable axillary masses or adenopathy  CV: regular rate and rhythm, normal S1 S2, no S3 or S4, no murmur, click or rub, no peripheral edema  ABDOMEN: soft, nontender, no hepatosplenomegaly, no masses and bowel sounds normal  SKIN: no suspicious lesions or rashes  NEURO: Normal strength and tone, mentation intact and speech normal  PSYCH: mentation appears normal, affect normal/bright        Signed Electronically by: Angela Mccormick NP

## 2024-04-24 NOTE — PATIENT INSTRUCTIONS
Sertraline (Zolft) preferred selective serotonin reuptake inhibitor in pregnancy.    Buspirone Pregnancy Considerations  Adverse events have not been observed in animal reproduction studies.  Limited outcome data following maternal use of buspirone are available from the National Pregnancy Registry for Psychiatric Medications, a prospective study of patients taking psychiatric medications during the first trimester of pregnancy (68 women, 72 infants) (Jimenes ), and postmarketing data collected following the initial release of buspirone (16 first trimester exposures) (Tera ).    Untreated anxiety in pregnant patients is associated with adverse maternal, fetal, and  outcomes. Treatment of anxiety disorders during pregnancy should include the patient as part of a shared decision-making process. Due to lack of data, agents other than buspirone are preferred for use during pregnancy. Consider discontinuing buspirone and using alternative therapies during pregnancy unless there is a clear benefit to continuing treatment (Jimmy 2018)    Lamictal Pregnancy Considerations  Lamotrigine crosses the human placenta and can be measured in the plasma of exposed newborns (Beth and Vane 2009; Sean 2000). An overall increase in the risk for major congenital malformations has not been observed in available studies; however, an increased risk for cleft lip or cleft palate has not been ruled out (Black 2011; Tarik-Doll 2012; Ginger 2012). An increased risk of malformations following maternal lamotrigine use may be associated with larger doses (Black 2007; Shade 2011). Polytherapy may increase the risk of congenital malformations; monotherapy with the lowest effective dose is recommended (Beth and Nghia 2009).    Due to pregnancy-induced physiologic changes, women who are pregnant may require dose adjustments of lamotrigine in order to maintain clinical response; monitoring during pregnancy  should be considered (Beth and Vane 2009). Baseline serum concentrations should be measured once or twice prior to pregnancy during a period when seizure control is optimal. Monitoring can then be continued up to once a month during pregnancy and every second day during the first week postpartum (Pedro 2008; Pedro 2018).    Pregnancy registries are available for women who have been exposed to lamotrigine. Patients may enroll themselves in the North American Antiepileptic Drug (NAAED) Pregnancy Registry by calling [phone]. Additional information is available at www.aedpregnancyregistry.org.    Preventive Care Advice   This is general advice given by our system to help you stay healthy. However, your care team may have specific advice just for you. Please talk to your care team about your preventive care needs.  Nutrition  Eat 5 or more servings of fruits and vegetables each day.  Try wheat bread, brown rice and whole grain pasta (instead of white bread, rice, and pasta).  Get enough calcium and vitamin D. Check the label on foods and aim for 100% of the RDA (recommended daily allowance).  Lifestyle  Exercise at least 150 minutes each week   (30 minutes a day, 5 days a week).  Do muscle strengthening activities 2 days a week. These help control your weight and prevent disease.  No smoking.  Wear sunscreen to prevent skin cancer.  Have a dental exam and cleaning every 6 months.  Yearly exams  See your health care team every year to talk about:  Any changes in your health.  Any medicines your care team has prescribed.  Preventive care, family planning, and ways to prevent chronic diseases.  Shots (vaccines)   HPV shots (up to age 26), if you've never had them before.  Hepatitis B shots (up to age 59), if you've never had them before.  COVID-19 shot: Get this shot when it's due.  Flu shot: Get a flu shot every year.  Tetanus shot: Get a tetanus shot every 10 years.  Pneumococcal, hepatitis A, and RSV  shots: Ask your care team if you need these based on your risk.  Shingles shot (for age 50 and up).  General health tests  Diabetes screening:  Starting at age 35, Get screened for diabetes at least every 3 years.  If you are younger than age 35, ask your care team if you should be screened for diabetes.  Cholesterol test: At age 39, start having a cholesterol test every 5 years, or more often if advised.  Bone density scan (DEXA): At age 50, ask your care team if you should have this scan for osteoporosis (brittle bones).  Hepatitis C: Get tested at least once in your life.  STIs (sexually transmitted infections)  Before age 24: Ask your care team if you should be screened for STIs.  After age 24: Get screened for STIs if you're at risk. You are at risk for STIs (including HIV) if:  You are sexually active with more than one person.  You don't use condoms every time.  You or a partner was diagnosed with a sexually transmitted infection.  If you are at risk for HIV, ask about PrEP medicine to prevent HIV.  Get tested for HIV at least once in your life, whether you are at risk for HIV or not.  Cancer screening tests  Cervical cancer screening: If you have a cervix, begin getting regular cervical cancer screening tests at age 21. Most people who have regular screenings with normal results can stop after age 65. Talk about this with your provider.  Breast cancer scan (mammogram): If you've ever had breasts, begin having regular mammograms starting at age 40. This is a scan to check for breast cancer.  Colon cancer screening: It is important to start screening for colon cancer at age 45.  Have a colonoscopy test every 10 years (or more often if you're at risk) Or, ask your provider about stool tests like a FIT test every year or Cologuard test every 3 years.  To learn more about your testing options, visit: https://www.[employer]/496942.pdf.  For help making a decision, visit: https://bit.ly/it58747.  Prostate cancer  screening test: If you have a prostate and are age 55 to 69, ask your provider if you would benefit from a yearly prostate cancer screening test.  Lung cancer screening: If you are a current or former smoker age 50 to 80, ask your care team if ongoing lung cancer screenings are right for you.  For informational purposes only. Not to replace the advice of your health care provider. Copyright   2023 [facility]. All rights reserved. Clinically reviewed by the [facility] Transitions Program. oroeco [id number] - REV 01/24.    Learning About Stress  What is stress?     Stress is your body's response to a hard situation. Your body can have a physical, emotional, or mental response. Stress is a fact of life for most people, and it affects everyone differently. What causes stress for you may not be stressful for someone else.  A lot of things can cause stress. You may feel stress when you go on a job interview, take a test, or run a race. This kind of short-term stress is normal and even useful. It can help you if you need to work hard or react quickly. For example, stress can help you finish an important job on time.  Long-term stress is caused by ongoing stressful situations or events. Examples of long-term stress include long-term health problems, ongoing problems at work, or conflicts in your family. Long-term stress can harm your health.  How does stress affect your health?  When you are stressed, your body responds as though you are in danger. It makes hormones that speed up your heart, make you breathe faster, and give you a burst of energy. This is called the fight-or-flight stress response. If the stress is over quickly, your body goes back to normal and no harm is done.  But if stress happens too often or lasts too long, it can have bad effects. Long-term stress can make you more likely to get sick, and it can make symptoms of some diseases worse. If you tense up when you are stressed, you may  develop neck, shoulder, or low back pain. Stress is linked to high blood pressure and heart disease.  Stress also harms your emotional health. It can make you hood, tense, or depressed. Your relationships may suffer, and you may not do well at work or school.  What can you do to manage stress?  You can try these things to help manage stress:   Do something active. Exercise or activity can help reduce stress. Walking is a great way to get started. Even everyday activities such as housecleaning or yard work can help.  Try yoga or lucia chi. These techniques combine exercise and meditation. You may need some training at first to learn them.  Do something you enjoy. For example, listen to music or go to a movie. Practice your hobby or do volunteer work.  Meditate. This can help you relax, because you are not worrying about what happened before or what may happen in the future.  Do guided imagery. Imagine yourself in any setting that helps you feel calm. You can use online videos, books, or a teacher to guide you.  Do breathing exercises. For example:  From a standing position, bend forward from the waist with your knees slightly bent. Let your arms dangle close to the floor.  Breathe in slowly and deeply as you return to a standing position. Roll up slowly and lift your head last.  Hold your breath for just a few seconds in the standing position.  Breathe out slowly and bend forward from the waist.  Let your feelings out. Talk, laugh, cry, and express anger when you need to. Talking with supportive friends or family, a counselor, or a jeancarlos leader about your feelings is a healthy way to relieve stress. Avoid discussing your feelings with people who make you feel worse.  Write. It may help to write about things that are bothering you. This helps you find out how much stress you feel and what is causing it. When you know this, you can find better ways to cope.  What can you do to prevent stress?  You might try some of  "these things to help prevent stress:  Manage your time. This helps you find time to do the things you want and need to do.  Get enough sleep. Your body recovers from the stresses of the day while you are sleeping.  Get support. Your family, friends, and community can make a difference in how you experience stress.  Limit your news feed. Avoid or limit time on social media or news that may make you feel stressed.  Do something active. Exercise or activity can help reduce stress. Walking is a great way to get started.  Where can you learn more?  Go to https://www.[employer].net/patiented  Enter N032 in the search box to learn more about \"Learning About Stress.\"  Current as of: October 24, 2023               Content Version: 14.0    5923-0264 [employer].   Care instructions adapted under license by your healthcare professional. If you have questions about a medical condition or this instruction, always ask your healthcare professional. [employer] disclaims any warranty or liability for your use of this information.      "

## 2025-01-05 ENCOUNTER — HEALTH MAINTENANCE LETTER (OUTPATIENT)
Age: 40
End: 2025-01-05

## 2025-06-01 ENCOUNTER — HEALTH MAINTENANCE LETTER (OUTPATIENT)
Age: 40
End: 2025-06-01